# Patient Record
Sex: MALE | Race: OTHER | HISPANIC OR LATINO | URBAN - METROPOLITAN AREA
[De-identification: names, ages, dates, MRNs, and addresses within clinical notes are randomized per-mention and may not be internally consistent; named-entity substitution may affect disease eponyms.]

---

## 2017-08-23 ENCOUNTER — INPATIENT (INPATIENT)
Facility: HOSPITAL | Age: 82
LOS: 3 days | Discharge: NURSING, REHAB OR LONG-TERM CARE | DRG: 71 | End: 2017-08-27
Attending: HOSPITALIST | Admitting: FAMILY MEDICINE
Payer: MEDICARE

## 2017-08-23 VITALS
TEMPERATURE: 99 F | HEIGHT: 72 IN | OXYGEN SATURATION: 97 % | HEART RATE: 79 BPM | SYSTOLIC BLOOD PRESSURE: 131 MMHG | RESPIRATION RATE: 16 BRPM | WEIGHT: 214.95 LBS | DIASTOLIC BLOOD PRESSURE: 61 MMHG

## 2017-08-23 DIAGNOSIS — F03.90 UNSPECIFIED DEMENTIA, UNSPECIFIED SEVERITY, WITHOUT BEHAVIORAL DISTURBANCE, PSYCHOTIC DISTURBANCE, MOOD DISTURBANCE, AND ANXIETY: ICD-10-CM

## 2017-08-23 DIAGNOSIS — E86.0 DEHYDRATION: ICD-10-CM

## 2017-08-23 DIAGNOSIS — G93.40 ENCEPHALOPATHY, UNSPECIFIED: ICD-10-CM

## 2017-08-23 DIAGNOSIS — Z78.9 OTHER SPECIFIED HEALTH STATUS: Chronic | ICD-10-CM

## 2017-08-23 DIAGNOSIS — N17.9 ACUTE KIDNEY FAILURE, UNSPECIFIED: ICD-10-CM

## 2017-08-23 DIAGNOSIS — D53.9 NUTRITIONAL ANEMIA, UNSPECIFIED: ICD-10-CM

## 2017-08-23 DIAGNOSIS — Z29.9 ENCOUNTER FOR PROPHYLACTIC MEASURES, UNSPECIFIED: ICD-10-CM

## 2017-08-23 DIAGNOSIS — I10 ESSENTIAL (PRIMARY) HYPERTENSION: ICD-10-CM

## 2017-08-23 DIAGNOSIS — D72.829 ELEVATED WHITE BLOOD CELL COUNT, UNSPECIFIED: ICD-10-CM

## 2017-08-23 DIAGNOSIS — I50.9 HEART FAILURE, UNSPECIFIED: ICD-10-CM

## 2017-08-23 LAB
ALBUMIN SERPL ELPH-MCNC: 4.5 G/DL — SIGNIFICANT CHANGE UP (ref 3.3–5.2)
ALP SERPL-CCNC: 75 U/L — SIGNIFICANT CHANGE UP (ref 40–120)
ALT FLD-CCNC: 10 U/L — SIGNIFICANT CHANGE UP
ANION GAP SERPL CALC-SCNC: 16 MMOL/L — SIGNIFICANT CHANGE UP (ref 5–17)
APPEARANCE UR: CLEAR — SIGNIFICANT CHANGE UP
APTT BLD: 27.4 SEC — LOW (ref 27.5–37.4)
AST SERPL-CCNC: 33 U/L — SIGNIFICANT CHANGE UP
BILIRUB SERPL-MCNC: 0.5 MG/DL — SIGNIFICANT CHANGE UP (ref 0.4–2)
BILIRUB UR-MCNC: NEGATIVE — SIGNIFICANT CHANGE UP
BUN SERPL-MCNC: 42 MG/DL — HIGH (ref 8–20)
CALCIUM SERPL-MCNC: 9.6 MG/DL — SIGNIFICANT CHANGE UP (ref 8.6–10.2)
CHLORIDE SERPL-SCNC: 103 MMOL/L — SIGNIFICANT CHANGE UP (ref 98–107)
CO2 SERPL-SCNC: 21 MMOL/L — LOW (ref 22–29)
COLOR SPEC: YELLOW — SIGNIFICANT CHANGE UP
CREAT SERPL-MCNC: 2.2 MG/DL — HIGH (ref 0.5–1.3)
D DIMER BLD IA.RAPID-MCNC: 514 NG/ML DDU — HIGH
DIFF PNL FLD: ABNORMAL
EPI CELLS # UR: SIGNIFICANT CHANGE UP
GLUCOSE SERPL-MCNC: 117 MG/DL — HIGH (ref 70–115)
GLUCOSE UR QL: NEGATIVE MG/DL — SIGNIFICANT CHANGE UP
HCT VFR BLD CALC: 36.4 % — LOW (ref 42–52)
HGB BLD-MCNC: 12.3 G/DL — LOW (ref 14–18)
INR BLD: 1.09 RATIO — SIGNIFICANT CHANGE UP (ref 0.88–1.16)
KETONES UR-MCNC: NEGATIVE — SIGNIFICANT CHANGE UP
LACTATE BLDV-MCNC: 0.9 MMOL/L — SIGNIFICANT CHANGE UP (ref 0.5–2)
LACTATE BLDV-MCNC: 3.2 MMOL/L — HIGH (ref 0.5–2)
LEUKOCYTE ESTERASE UR-ACNC: NEGATIVE — SIGNIFICANT CHANGE UP
LYMPHOCYTES # BLD AUTO: 2 % — LOW (ref 20–55)
MCHC RBC-ENTMCNC: 33.8 G/DL — SIGNIFICANT CHANGE UP (ref 32–36)
MCHC RBC-ENTMCNC: 34.3 PG — HIGH (ref 27–31)
MCV RBC AUTO: 101.4 FL — HIGH (ref 80–94)
MONOCYTES NFR BLD AUTO: 5 % — SIGNIFICANT CHANGE UP (ref 3–10)
NEUTROPHILS NFR BLD AUTO: 93 % — HIGH (ref 37–73)
NITRITE UR-MCNC: NEGATIVE — SIGNIFICANT CHANGE UP
NT-PROBNP SERPL-SCNC: 463 PG/ML — HIGH (ref 0–300)
PH UR: 5 — SIGNIFICANT CHANGE UP (ref 5–8)
PLAT MORPH BLD: NORMAL — SIGNIFICANT CHANGE UP
PLATELET # BLD AUTO: 262 K/UL — SIGNIFICANT CHANGE UP (ref 150–400)
POTASSIUM SERPL-MCNC: 4.7 MMOL/L — SIGNIFICANT CHANGE UP (ref 3.5–5.3)
POTASSIUM SERPL-SCNC: 4.7 MMOL/L — SIGNIFICANT CHANGE UP (ref 3.5–5.3)
PROCALCITONIN SERPL-MCNC: 0.05 NG/ML — HIGH (ref 0–0.04)
PROT SERPL-MCNC: 7.5 G/DL — SIGNIFICANT CHANGE UP (ref 6.6–8.7)
PROT UR-MCNC: NEGATIVE MG/DL — SIGNIFICANT CHANGE UP
PROTHROM AB SERPL-ACNC: 12 SEC — SIGNIFICANT CHANGE UP (ref 9.8–12.7)
RBC # BLD: 3.59 M/UL — LOW (ref 4.6–6.2)
RBC # FLD: 12.7 % — SIGNIFICANT CHANGE UP (ref 11–15.6)
RBC BLD AUTO: NORMAL — SIGNIFICANT CHANGE UP
RBC CASTS # UR COMP ASSIST: SIGNIFICANT CHANGE UP /HPF (ref 0–4)
SODIUM SERPL-SCNC: 140 MMOL/L — SIGNIFICANT CHANGE UP (ref 135–145)
SP GR SPEC: 1.01 — SIGNIFICANT CHANGE UP (ref 1.01–1.02)
TROPONIN T SERPL-MCNC: <0.01 NG/ML — SIGNIFICANT CHANGE UP (ref 0–0.06)
TSH SERPL-MCNC: 2.62 UIU/ML — SIGNIFICANT CHANGE UP (ref 0.27–4.2)
UROBILINOGEN FLD QL: NEGATIVE MG/DL — SIGNIFICANT CHANGE UP
WBC # BLD: 12.8 K/UL — HIGH (ref 4.8–10.8)
WBC # FLD AUTO: 12.8 K/UL — HIGH (ref 4.8–10.8)
WBC UR QL: SIGNIFICANT CHANGE UP

## 2017-08-23 PROCEDURE — 99285 EMERGENCY DEPT VISIT HI MDM: CPT

## 2017-08-23 PROCEDURE — 71250 CT THORAX DX C-: CPT | Mod: 26

## 2017-08-23 PROCEDURE — 99223 1ST HOSP IP/OBS HIGH 75: CPT

## 2017-08-23 PROCEDURE — 70450 CT HEAD/BRAIN W/O DYE: CPT | Mod: 26

## 2017-08-23 PROCEDURE — 71010: CPT | Mod: 26

## 2017-08-23 RX ORDER — AZITHROMYCIN 500 MG/1
500 TABLET, FILM COATED ORAL ONCE
Qty: 0 | Refills: 0 | Status: COMPLETED | OUTPATIENT
Start: 2017-08-23 | End: 2017-08-23

## 2017-08-23 RX ORDER — CEFTRIAXONE 500 MG/1
1 INJECTION, POWDER, FOR SOLUTION INTRAMUSCULAR; INTRAVENOUS ONCE
Qty: 0 | Refills: 0 | Status: COMPLETED | OUTPATIENT
Start: 2017-08-23 | End: 2017-08-23

## 2017-08-23 RX ORDER — SODIUM CHLORIDE 9 MG/ML
500 INJECTION INTRAMUSCULAR; INTRAVENOUS; SUBCUTANEOUS ONCE
Qty: 0 | Refills: 0 | Status: COMPLETED | OUTPATIENT
Start: 2017-08-23 | End: 2017-08-23

## 2017-08-23 RX ORDER — AZITHROMYCIN 500 MG/1
500 TABLET, FILM COATED ORAL DAILY
Qty: 0 | Refills: 0 | Status: DISCONTINUED | OUTPATIENT
Start: 2017-08-24 | End: 2017-08-26

## 2017-08-23 RX ORDER — CEFTRIAXONE 500 MG/1
1 INJECTION, POWDER, FOR SOLUTION INTRAMUSCULAR; INTRAVENOUS DAILY
Qty: 0 | Refills: 0 | Status: DISCONTINUED | OUTPATIENT
Start: 2017-08-24 | End: 2017-08-26

## 2017-08-23 RX ORDER — RAMIPRIL 5 MG
0 CAPSULE ORAL
Qty: 0 | Refills: 0 | COMMUNITY

## 2017-08-23 RX ORDER — DOCUSATE SODIUM 100 MG
0 CAPSULE ORAL
Qty: 0 | Refills: 0 | COMMUNITY

## 2017-08-23 RX ORDER — SODIUM CHLORIDE 9 MG/ML
1000 INJECTION INTRAMUSCULAR; INTRAVENOUS; SUBCUTANEOUS
Qty: 0 | Refills: 0 | Status: COMPLETED | OUTPATIENT
Start: 2017-08-23 | End: 2017-08-23

## 2017-08-23 RX ORDER — HEPARIN SODIUM 5000 [USP'U]/ML
5000 INJECTION INTRAVENOUS; SUBCUTANEOUS EVERY 12 HOURS
Qty: 0 | Refills: 0 | Status: DISCONTINUED | OUTPATIENT
Start: 2017-08-23 | End: 2017-08-27

## 2017-08-23 RX ORDER — FUROSEMIDE 40 MG
0 TABLET ORAL
Qty: 0 | Refills: 0 | COMMUNITY

## 2017-08-23 RX ORDER — TAMSULOSIN HYDROCHLORIDE 0.4 MG/1
0.4 CAPSULE ORAL AT BEDTIME
Qty: 0 | Refills: 0 | Status: DISCONTINUED | OUTPATIENT
Start: 2017-08-23 | End: 2017-08-27

## 2017-08-23 RX ORDER — DONEPEZIL HYDROCHLORIDE 10 MG/1
0 TABLET, FILM COATED ORAL
Qty: 0 | Refills: 0 | COMMUNITY

## 2017-08-23 RX ORDER — SACCHAROMYCES BOULARDII 250 MG
250 POWDER IN PACKET (EA) ORAL
Qty: 0 | Refills: 0 | Status: DISCONTINUED | OUTPATIENT
Start: 2017-08-23 | End: 2017-08-27

## 2017-08-23 RX ORDER — DONEPEZIL HYDROCHLORIDE 10 MG/1
5 TABLET, FILM COATED ORAL AT BEDTIME
Qty: 0 | Refills: 0 | Status: DISCONTINUED | OUTPATIENT
Start: 2017-08-23 | End: 2017-08-27

## 2017-08-23 RX ORDER — METOPROLOL TARTRATE 50 MG
25 TABLET ORAL
Qty: 0 | Refills: 0 | Status: DISCONTINUED | OUTPATIENT
Start: 2017-08-23 | End: 2017-08-24

## 2017-08-23 RX ORDER — FINASTERIDE 5 MG/1
0 TABLET, FILM COATED ORAL
Qty: 0 | Refills: 0 | COMMUNITY

## 2017-08-23 RX ORDER — RISPERIDONE 4 MG/1
0.25 TABLET ORAL DAILY
Qty: 0 | Refills: 0 | Status: DISCONTINUED | OUTPATIENT
Start: 2017-08-23 | End: 2017-08-27

## 2017-08-23 RX ORDER — SODIUM CHLORIDE 9 MG/ML
1000 INJECTION INTRAMUSCULAR; INTRAVENOUS; SUBCUTANEOUS
Qty: 0 | Refills: 0 | Status: DISCONTINUED | OUTPATIENT
Start: 2017-08-23 | End: 2017-08-23

## 2017-08-23 RX ORDER — SODIUM CHLORIDE 9 MG/ML
1000 INJECTION INTRAMUSCULAR; INTRAVENOUS; SUBCUTANEOUS ONCE
Qty: 0 | Refills: 0 | Status: COMPLETED | OUTPATIENT
Start: 2017-08-23 | End: 2017-08-23

## 2017-08-23 RX ORDER — RISPERIDONE 4 MG/1
0 TABLET ORAL
Qty: 0 | Refills: 0 | COMMUNITY

## 2017-08-23 RX ADMIN — CEFTRIAXONE 100 GRAM(S): 500 INJECTION, POWDER, FOR SOLUTION INTRAMUSCULAR; INTRAVENOUS at 15:00

## 2017-08-23 RX ADMIN — AZITHROMYCIN 255 MILLIGRAM(S): 500 TABLET, FILM COATED ORAL at 14:08

## 2017-08-23 RX ADMIN — SODIUM CHLORIDE 500 MILLILITER(S): 9 INJECTION INTRAMUSCULAR; INTRAVENOUS; SUBCUTANEOUS at 09:28

## 2017-08-23 RX ADMIN — Medication 25 MILLIGRAM(S): at 22:28

## 2017-08-23 RX ADMIN — DONEPEZIL HYDROCHLORIDE 5 MILLIGRAM(S): 10 TABLET, FILM COATED ORAL at 22:29

## 2017-08-23 RX ADMIN — SODIUM CHLORIDE 75 MILLILITER(S): 9 INJECTION INTRAMUSCULAR; INTRAVENOUS; SUBCUTANEOUS at 22:29

## 2017-08-23 RX ADMIN — TAMSULOSIN HYDROCHLORIDE 0.4 MILLIGRAM(S): 0.4 CAPSULE ORAL at 22:29

## 2017-08-23 RX ADMIN — SODIUM CHLORIDE 1000 MILLILITER(S): 9 INJECTION INTRAMUSCULAR; INTRAVENOUS; SUBCUTANEOUS at 12:51

## 2017-08-23 NOTE — H&P ADULT - NSHPSOCIALHISTORY_GEN_ALL_CORE
Pt lives in a nursery home ( [facility]) , no hx of smoking or alcohol as per nursery home staff

## 2017-08-23 NOTE — H&P ADULT - HISTORY OF PRESENT ILLNESS
Pt is an 88 yr old M with PMH of HTN, BPH, Dementia, CHF and blindness of right eye , pt is a poor historian . Pt was brought in by the EMS. As per EMS and staff from the nursery  home pt normally gets out of bed and comes to breakfast and today he did not get out of bed, he seemed more lethargic, more confused than usual, on his baseline pt is able to ambulate and eat by himself, on vitals signs taken by the EMS pt was hypotensive with bp= 100/50 mmhg , he denies chest pain , palpitation , headache , no diarrhea or abdominal pain , dizziness , no calf tenderness, no recent travel. Pt is an 88 yr old M with PMH of HTN, BPH, Dementia, CHF and blindness of right eye , pt is a poor historian . Pt was brought in by the EMS. As per EMS and staff from the nursery  home pt normally gets out of bed daily  and comes to breakfast but today he did not get out of bed, he seemed more lethargic, more confused than usual, on his baseline pt is able to ambulate and eat by himself. On  vitals signs taken by the EMS pt was hypotensive with bp= 100/50 mmhg ,he denies chest pain , palpitations , headache , no diarrhea or abdominal pain , dizziness , no calf tenderness, no recent travel. Pt is an 88 yr old M with PMH of HTN, BPH, Dementia, CHF and blindness of right eye , pt is a poor historian . Pt was brought in by the EMS. As per EMS and staff from the nursery  home pt normally gets out of bed daily  and comes to breakfast but today he did not get out of bed, he seemed more lethargic, more confused than usual, on his baseline pt is able to ambulate and eat by himself. The day prior to this event pt was having his normal behavior, the  staff from the nursery home do not exactly when he was last time he was know well.  On  vitals signs taken by the EMS pt was hypotensive with bp= 100/50 mmhg ,he denies chest pain , palpitations , headache , no diarrhea or abdominal pain , dizziness , no calf tenderness, no recent travel.

## 2017-08-23 NOTE — H&P ADULT - PROBLEM SELECTOR PLAN 2
- pt had 2 lit of fluid in the ED will continue gentle hydration   -strict in and out   -will repeat lab in the am  -hold home diuretic - pt had 2 lits of fluid in the ED will continue gentle hydration at 75 ml / hr  -strict in and out   -will repeat lab in the am  -hold home diuretic for now

## 2017-08-23 NOTE — ED ADULT NURSE REASSESSMENT NOTE - NS ED NURSE REASSESS COMMENT FT1
CORDELIA Fink @ [facility] called and reports that typically patient is alert and oriented to self with disorientation. Normally patient wakes up and eats breakfast and is active walking around home. patient today noted with increased lethargy, increasing AMS beyond typical baseline, did not want to eat breakfast, low temp 95.3.

## 2017-08-23 NOTE — H&P ADULT - PMH
Blindness of one eye    CHF (congestive heart failure)    Dementia    Hypertension    Poor historian

## 2017-08-23 NOTE — H&P ADULT - ATTENDING COMMENTS
Pt is an 88 yr old M with PMH of HTN, BPH, Dementia, CHF and blindness of right eye , pt is a poor historian . Pt was brought in by the EMS.  Mr. Muniz has had an abrupt change in mentation and fatigue. He is noted to have an elevated WBC level with a left shift. There is concern that Mr. Muniz has an infections process possibly a pneumonia. Patient has an elevated procalcitonin. At this time we will get a stat CT chest without contrast.  If the imaging support our diagnosis we will treat with IV zosyn.  This case was discussed with Dr. Orr and PGY-1 Dr. Solorio.    Dr. Orr agrees with the management. Pt is an 88 yr old M with PMH of HTN, BPH, Dementia, CHF and blindness of right eye , pt is a poor historian . Pt was brought in by the EMS.  Mr. Muniz has had an abrupt change in mentation and fatigue. He is noted to have an elevated WBC level with a left shift. There is concern that Mr. Muniz has an infections process possibly a pneumonia. Patient has an elevated procalcitonin. At this time we will get a stat CT chest without contrast.  If the imaging support our diagnosis we will treat with IV zosyn.  This case was discussed with Dr. Orr and PGY-1 Dr. Solorio.    Dr. Orr agrees with the management.  agree with the management

## 2017-08-23 NOTE — H&P ADULT - ASSESSMENT
Pt is a 88 yr old with PMH of vascular dementia, right eye blindness' ,  CHF, HTN, BPH  who was brought in by the EMS from his nursery home with a CC of change in mental status, as per staff member of his nursery home pt usually wake up in the morning and has breakfast, but today he had some unusual behavior , he was lethargy and confused . This pt has some clinical sing of dehydration, confused, he WBC was elevated and his WBC on previous admissions, his kidney function shows elevated BNU and Cr showing sings of acute dehydration and he was blood pressure was 100/50 mmhg  . His Ct of head was negative for any acute changes  , CXR was clear. Pt is alert to place , no to time or person. Pt is a 88 yr old with PMH of vascular dementia, right eye blindness' ,  CHF, HTN, BPH  who was brought in by the EMS from his nursery home with a CC of change in mental status, as per staff member of his nursery home pt usually wake up in the morning and has breakfast, but today he had some unusual behavior , he was lethargy and confused . This pt has some clinical sing of dehydration, he is confused, his WBC was elevated and his WBC on previous admissions was normal , his kidney function shows elevated BNU and Cr most likely secondary  of acute dehydration and he was blood pressure was 100/50 mmhg . His Ct of head was negative for any acute changes  , CXR was clear. Pt is alert to place , no to time or person.

## 2017-08-23 NOTE — ED ADULT TRIAGE NOTE - CHIEF COMPLAINT QUOTE
BIBA, patient awoke by staff noted with increased lethargy, AMS. MD encarnacion to bedside. A&Ox 1, . Unknown baseline mental status, sent with med list and face sheet.

## 2017-08-23 NOTE — ED PROVIDER NOTE - CARE PLAN
Principal Discharge DX:	Dementia  Secondary Diagnosis:	Altered mental status, unspecified Principal Discharge DX:	Dementia  Secondary Diagnosis:	Altered mental status, unspecified  Secondary Diagnosis:	Dehydration

## 2017-08-23 NOTE — H&P ADULT - PROBLEM SELECTOR PLAN 3
- likely 2/2 to infection with WBC of 12.8 and on previous hospitalization his WBC count was between 6.7   -will obtain a differential , will repeat lab in the am  -BCx and Urine culture are pending

## 2017-08-23 NOTE — H&P ADULT - PROBLEM SELECTOR PLAN 4
-likely 2/2 to dehydration  -BUN and Cr are elevated uncleared for any chronic kidney disease   - pt had 2 lit of fluid in the ED will continue gentle hydration   -will trend BUN and Cr in the am -likely 2/2 to dehydration  -BUN and Cr are elevated uncleared for any chronic kidney disease previously   - pt had 2 lit of fluid in the ED will continue gentle hydration at 75 ml/hr  -will trend BUN and Cr in the am

## 2017-08-23 NOTE — H&P ADULT - NSHPLABSRESULTS_GEN_ALL_CORE
12.3   12.8  )-----------( 262      ( 23 Aug 2017 11:41 )             36.4   08-23    140  |  103  |  42.0<H>  ----------------------------<  117<H>  4.7   |  21.0<L>  |  2.20<H>    Ca    9.6      23 Aug 2017 11:41    TPro  7.5  /  Alb  4.5  /  TBili  0.5  /  DBili  x   /  AST  33  /  ALT  10  /  AlkPhos  75  08-23    Urinalysis (08.23.17 @ 11:55)    Glucose Qualitative, Urine: Negative mg/dL    Blood, Urine: Moderate    pH Urine: 5.0    Color: Yellow    Urine Appearance: Clear    Bilirubin: Negative    Ketone - Urine: Negative    Specific Gravity: 1.015    Protein, Urine: Negative mg/dL    Urobilinogen: Negative mg/dL    Nitrite: Negative    Leukocyte Esterase Concentration: Negative        CT Head No Cont (08.23.17 @ 12:56)     IMPRESSION:  Mild chronic microvascular changes without evidence of an   acute transcortical infarction or hemorrhage. MR is a more sensitive   imaging modality for the evaluation of an acute infarction.

## 2017-08-23 NOTE — H&P ADULT - NSHPREVIEWOFSYSTEMS_GEN_ALL_CORE
CONSTITUTIONAL: No fever, weight loss, or fatigue  EYES: No eye pain, or discharge, blindness or right eye   ENMT:  No difficulty hearing, tinnitus, vertigo; No sinus or throat pain  NECK: No pain or stiffness  RESPIRATORY: No cough, wheezing, chills or hemoptysis; No shortness of breath  CARDIOVASCULAR: No chest pain, palpitations, dizziness, or leg swelling  GASTROINTESTINAL: No abdominal or epigastric pain. No nausea, vomiting, or hematemesis; No diarrhea or constipation. No melena or hematochezia.  GENITOURINARY: No dysuria, frequency, hematuria, or incontinence  NEUROLOGICAL: No headaches, positive memory loss, no numbness, positive tremors in bilateral upper extremities   SKIN: No itching, burning, rashes, or lesions   MUSCULOSKELETAL: No joint pain or swelling; No muscle, back, or extremity pain  HEME/LYMPH: No easy bruising, or bleeding gums  ALLERY AND IMMUNOLOGIC: No hives or eczema Pt is a poor historian , he had PMH of vascular dementia    CONSTITUTIONAL: No fever, weight loss, or fatigue  EYES: No eye pain, or discharge, blindness or right eye   ENMT:  No difficulty hearing, tinnitus, vertigo; No sinus or throat pain  NECK: No pain or stiffness  RESPIRATORY: No cough, chills or hemoptysis; No shortness of breath  CARDIOVASCULAR: No chest pain, palpitations, dizziness, or leg swelling  GASTROINTESTINAL: No abdominal or epigastric pain. No nausea, vomiting, or hematemesis; No diarrhea or constipation. No melena or hematochezia.  GENITOURINARY: No dysuria, frequency, hematuria,   NEUROLOGICAL: No headaches, positive memory loss, no numbness, positive tremors in bilateral upper extremities   SKIN: No itching, burning, rashes, or lesions   MUSCULOSKELETAL: No joint pain or swelling; No muscle, back, or extremity pain  HEME/LYMPH: No easy bruising, or bleeding gums

## 2017-08-23 NOTE — ED ADULT NURSE NOTE - OBJECTIVE STATEMENT
pt presents to ED sent by [facility] for increased lethargy this morning an dpt did not eat breakfast,. pt has hx of dementia. baseline mental status is unknown. pt a&ox1, pt alert to name. breathing si even and unlabored. airway patent. afebrile. pt denies pain at this time. breathing is even and unlabored. fluids infusing well. will continue to monitor and reassess

## 2017-08-23 NOTE — H&P ADULT - PROBLEM SELECTOR PLAN 5
-Hb = 12.3 with elevated MCV   - vit b12, folic acid, transferrin , iron saturation , TIBC,  ordered -Hb = 12.3 with elevated MCV   - vit b12, folic acid, transferrin , reticulocyte count ,iron saturation , TIBC,  ordered

## 2017-08-23 NOTE — ED PROVIDER NOTE - OBJECTIVE STATEMENT
89y/o M, woth PMH of HTN, dementia, brought in by EMS from NH. EMS states NH staff told them that the pt normally gets out of bed and comes to breakfast and today he did not get out of bed, he seemed more lethargic, more confused than usual. When they asked when was he last known well, EMS was told the that they do not know because they are the day staff and they do not know how he was last night. The pt has no complaints. He knows his name and that he's in a hospital. He does not know the day or time of day.

## 2017-08-23 NOTE — H&P ADULT - PROBLEM SELECTOR PLAN 7
-pt is not in acute CHF at this time   -daily weight  -strict in and out  -will hold his diuretics an ramipril for now   -start metoprolol 25 mg bib, will hold metoprolol ER 50 mg for now   - -pt is not in acute CHF at this time   -daily weight  -strict in and out  -will hold his diuretics an ramipril for now   -start metoprolol 25 mg bib, will hold metoprolol ER 50 mg for now

## 2017-08-23 NOTE — ED PROVIDER NOTE - MEDICAL DECISION MAKING DETAILS
Elderly pt with hx of dementia reported to be more confused and less responsive by NH staff. NAD exam consistent with demenia exacerbation or possible superimposed infection, no focality to suggest stroke. We will get W/U with labs, cultures, head CT, EKG, and reevaluate Elderly pt with hx of dementia reported to be more confused and less responsive by NH staff. NAD exam consistent with dementia exacerbation or possible superimposed infection, no focality to suggest stroke. We will get W/U with labs, cultures, head CT, EKG, and reevaluate

## 2017-08-23 NOTE — ED PROVIDER NOTE - PHYSICAL EXAMINATION
Pt was able to stand and take a few steps with assistance. Pt states through the  that he normally walks with a walker. Rectal Temp:99.9

## 2017-08-23 NOTE — H&P ADULT - PROBLEM SELECTOR PLAN 1
-likely secondary to infectious etiology vs dehydration   -admit to any any medical  bed   -admit to medicine service under Dr Orr   -activity ambulate with assitance , falls precautions  -vitals per routine   -diet DASH   -strict in and out  -CBC , CMP, ua, blood culture x 3, lactate , procalcitonin , urine culture   - AM lab cbc, cmp  -pt had 2 lit of fluid bolus and now will continue gentle hydration   -pt was tx in the ED with Rocephin and azithromycin   -head CT was negative for acute pathology , CXR was clear   -PT consult, nutritional consult and  consul were placed -likely secondary to infectious etiology vs dehydration   -admit to any any medical  bed , medicine service under Dr Orr   -activity ambulate with assistance , falls precautions  -vitals per routine   -diet DASH   -strict in and out  -CBC , CMP, ua, blood culture x 3, lactate , procalcitonin , urine culture   - AM lab cbc, cmp  -pt had 2 lit of fluid bolus and now will continue gentle hydration   -pt was tx in the ED with Rocephin and azithromycin   -head CT was negative for acute pathology , CXR was clear   -PT consult, nutritional consult and  consul were placed -likely secondary to infectious etiology vs dehydration   -admit to any any medical  bed , medicine service under Dr Orr   -activity ambulate with assistance , falls precautions  -vitals per routine   -diet DASH   -strict in and out  -CBC , CMP, ua, blood culture x 3, lactate , procalcitonin , urine culture   - AM lab cbc, cmp  -pt had 2 lit of fluid bolus and now will continue gentle hydration   -pt was tx in the ED with Rocephin and azithromycin   -head CT was negative for acute pathology , CXR was clear , CT of chest was ordered  to rule out PNA , if positive for PNA will start tx w/ zosyn if negative will continue Rocephin and azithromycin   -PT consult, nutritional consult and  consul were placed

## 2017-08-23 NOTE — ED ADULT NURSE REASSESSMENT NOTE - NS ED NURSE REASSESS COMMENT FT1
verbal report given to CORDELIA jarrett in holding room. pt resting comfortably in cart. breathing si even and unlabored. NAD VSS<

## 2017-08-23 NOTE — H&P ADULT - NSHPPHYSICALEXAM_GEN_ALL_CORE
GENERAL: NAD, well-groomed, well-developed  HEAD:  Atraumatic, Normocephalic  EYES: EOMI, PERRLA, conjunctiva and sclera clear  ENMT: No tonsillar erythema, exudates, or enlargement; Dry  mucous membranes, no dentition, No lesions  NECK: Supple, No JVD, Normal thyroid  LUNG: Clear to auscultation bilaterally; No rales, rhonchi, wheezing, or rubs  HEART: Regular rate and rhythm; No murmurs, rubs, or gallops  ABDOMEN: Soft, Nontender, Nondistended; Bowel sounds present  EXTREMITIES:  2+ Peripheral Pulses, No clubbing, cyanosis, or edema  LYMPH: No lymphadenopathy noted  RECTAL: No masses, prostate normal size and smooth, Guiac negative   NERVOUS SYSTEM:  Alert & Oriented X1 ( to person , no to place , no to time ) Motor Strength 5/5 B/L upper and lower extremities  SKIN: No rashes, lesions, no laceration, no ulcers GENERAL: NAD, well-groomed, pt seem to be confused   HEAD:  Atraumatic, Normocephalic  EYES: EOMI, PERRLA, conjunctiva and sclera clear, blindness or right eye   ENMT: No tonsillar erythema, exudates, or enlargement; Dry  mucous membranes, no dentition, No lesions  NECK: Supple, No JVD, Normal thyroid  LUNG: Clear to auscultation bilaterally; No rales, rhonchi, wheezing, or rubs  HEART: Regular rate and rhythm; No murmurs, rubs, or gallops  ABDOMEN: Soft, Nontender, Nondistended; Bowel sounds present  EXTREMITIES:  2+ Peripheral Pulses, No clubbing, cyanosis, or edema  LYMPH: No lymphadenopathy noted  RECTAL: No masses, prostate normal size and smooth, Guiac negative   NERVOUS SYSTEM:  Alert & Oriented X1 ( to person , no to place , no to time ) Motor Strength 5/5 B/L upper and lower extremities, sensation intact   SKIN: No rashes, lesions, no laceration, no ulcers

## 2017-08-23 NOTE — H&P ADULT - PROBLEM SELECTOR PLAN 6
-likely essential HTN  -pt blood pressure was low  -pt on home dose of metoprolol ER 50 mg, will use metoprolol 25 mg bid for now  -will hold his diuretic and ramipril for now b/c pt has JESSE -likely essential HTN  -pt blood pressure was low 100/50 mmhg  -pt on home dose of metoprolol ER 50 mg, will use metoprolol 25 mg bid for now  -will hold his diuretic and ramipril for now b/c pt has JESSE

## 2017-08-23 NOTE — ED PROVIDER NOTE - CONSTITUTIONAL, MLM
normal... Well appearing, well nourished, awake, alert, oriented to person, place,  and in no apparent distress.

## 2017-08-24 LAB
ALBUMIN SERPL ELPH-MCNC: 3.7 G/DL — SIGNIFICANT CHANGE UP (ref 3.3–5.2)
ALP SERPL-CCNC: 67 U/L — SIGNIFICANT CHANGE UP (ref 40–120)
ALT FLD-CCNC: 22 U/L — SIGNIFICANT CHANGE UP
ANION GAP SERPL CALC-SCNC: 13 MMOL/L — SIGNIFICANT CHANGE UP (ref 5–17)
AST SERPL-CCNC: 108 U/L — HIGH
BILIRUB SERPL-MCNC: 0.6 MG/DL — SIGNIFICANT CHANGE UP (ref 0.4–2)
BUN SERPL-MCNC: 32 MG/DL — HIGH (ref 8–20)
CALCIUM SERPL-MCNC: 9.2 MG/DL — SIGNIFICANT CHANGE UP (ref 8.6–10.2)
CHLORIDE SERPL-SCNC: 106 MMOL/L — SIGNIFICANT CHANGE UP (ref 98–107)
CO2 SERPL-SCNC: 22 MMOL/L — SIGNIFICANT CHANGE UP (ref 22–29)
CREAT SERPL-MCNC: 1.4 MG/DL — HIGH (ref 0.5–1.3)
CULTURE RESULTS: SIGNIFICANT CHANGE UP
EOSINOPHIL # BLD AUTO: 0.1 K/UL — SIGNIFICANT CHANGE UP (ref 0–0.5)
EOSINOPHIL NFR BLD AUTO: 0.8 % — SIGNIFICANT CHANGE UP (ref 0–5)
FOLATE SERPL-MCNC: 9.6 NG/ML — SIGNIFICANT CHANGE UP (ref 4–16)
GLUCOSE SERPL-MCNC: 111 MG/DL — SIGNIFICANT CHANGE UP (ref 70–115)
HCT VFR BLD CALC: 36.3 % — LOW (ref 42–52)
HGB BLD-MCNC: 12.5 G/DL — LOW (ref 14–18)
IRON SATN MFR SERPL: 32 % — SIGNIFICANT CHANGE UP (ref 16–55)
IRON SATN MFR SERPL: 89 UG/DL — SIGNIFICANT CHANGE UP (ref 59–158)
LYMPHOCYTES # BLD AUTO: 1 K/UL — SIGNIFICANT CHANGE UP (ref 1–4.8)
LYMPHOCYTES # BLD AUTO: 9.9 % — LOW (ref 20–55)
MAGNESIUM SERPL-MCNC: 2 MG/DL — SIGNIFICANT CHANGE UP (ref 1.6–2.6)
MCHC RBC-ENTMCNC: 34.4 G/DL — SIGNIFICANT CHANGE UP (ref 32–36)
MCHC RBC-ENTMCNC: 34.8 PG — HIGH (ref 27–31)
MCV RBC AUTO: 101.1 FL — HIGH (ref 80–94)
MONOCYTES # BLD AUTO: 1 K/UL — HIGH (ref 0–0.8)
MONOCYTES NFR BLD AUTO: 9.5 % — SIGNIFICANT CHANGE UP (ref 3–10)
NEUTROPHILS # BLD AUTO: 8 K/UL — SIGNIFICANT CHANGE UP (ref 1.8–8)
NEUTROPHILS NFR BLD AUTO: 79.4 % — HIGH (ref 37–73)
PHOSPHATE SERPL-MCNC: 2.8 MG/DL — SIGNIFICANT CHANGE UP (ref 2.4–4.7)
PLATELET # BLD AUTO: 234 K/UL — SIGNIFICANT CHANGE UP (ref 150–400)
POTASSIUM SERPL-MCNC: 5 MMOL/L — SIGNIFICANT CHANGE UP (ref 3.5–5.3)
POTASSIUM SERPL-SCNC: 5 MMOL/L — SIGNIFICANT CHANGE UP (ref 3.5–5.3)
PROT SERPL-MCNC: 6.8 G/DL — SIGNIFICANT CHANGE UP (ref 6.6–8.7)
RBC # BLD: 3.59 M/UL — LOW (ref 4.6–6.2)
RBC # BLD: 3.59 M/UL — LOW (ref 4.6–6.2)
RBC # FLD: 12.5 % — SIGNIFICANT CHANGE UP (ref 11–15.6)
RETICS #: 9.2 K/UL — LOW (ref 25–125)
RETICS/RBC NFR: 2.6 % — SIGNIFICANT CHANGE UP (ref 0.5–2.6)
SODIUM SERPL-SCNC: 141 MMOL/L — SIGNIFICANT CHANGE UP (ref 135–145)
SPECIMEN SOURCE: SIGNIFICANT CHANGE UP
TIBC SERPL-MCNC: 275 UG/DL — SIGNIFICANT CHANGE UP (ref 220–430)
TRANSFERRIN SERPL-MCNC: 192 MG/DL — SIGNIFICANT CHANGE UP (ref 180–329)
VIT B12 SERPL-MCNC: 455 PG/ML — SIGNIFICANT CHANGE UP (ref 180–914)
WBC # BLD: 10.1 K/UL — SIGNIFICANT CHANGE UP (ref 4.8–10.8)
WBC # FLD AUTO: 10.1 K/UL — SIGNIFICANT CHANGE UP (ref 4.8–10.8)

## 2017-08-24 PROCEDURE — 76775 US EXAM ABDO BACK WALL LIM: CPT | Mod: 26

## 2017-08-24 PROCEDURE — 99233 SBSQ HOSP IP/OBS HIGH 50: CPT

## 2017-08-24 PROCEDURE — 93306 TTE W/DOPPLER COMPLETE: CPT | Mod: 26

## 2017-08-24 RX ORDER — DOCUSATE SODIUM 100 MG
100 CAPSULE ORAL DAILY
Qty: 0 | Refills: 0 | Status: DISCONTINUED | OUTPATIENT
Start: 2017-08-24 | End: 2017-08-27

## 2017-08-24 RX ORDER — FUROSEMIDE 40 MG
20 TABLET ORAL
Qty: 0 | Refills: 0 | Status: DISCONTINUED | OUTPATIENT
Start: 2017-08-24 | End: 2017-08-27

## 2017-08-24 RX ORDER — LISINOPRIL 2.5 MG/1
5 TABLET ORAL DAILY
Qty: 0 | Refills: 0 | Status: DISCONTINUED | OUTPATIENT
Start: 2017-08-24 | End: 2017-08-27

## 2017-08-24 RX ORDER — METOPROLOL TARTRATE 50 MG
25 TABLET ORAL DAILY
Qty: 0 | Refills: 0 | Status: DISCONTINUED | OUTPATIENT
Start: 2017-08-25 | End: 2017-08-27

## 2017-08-24 RX ADMIN — Medication 1 DROP(S): at 22:08

## 2017-08-24 RX ADMIN — CEFTRIAXONE 100 GRAM(S): 500 INJECTION, POWDER, FOR SOLUTION INTRAMUSCULAR; INTRAVENOUS at 12:45

## 2017-08-24 RX ADMIN — TAMSULOSIN HYDROCHLORIDE 0.4 MILLIGRAM(S): 0.4 CAPSULE ORAL at 22:08

## 2017-08-24 RX ADMIN — AZITHROMYCIN 255 MILLIGRAM(S): 500 TABLET, FILM COATED ORAL at 12:44

## 2017-08-24 RX ADMIN — HEPARIN SODIUM 5000 UNIT(S): 5000 INJECTION INTRAVENOUS; SUBCUTANEOUS at 22:08

## 2017-08-24 RX ADMIN — Medication 250 MILLIGRAM(S): at 22:08

## 2017-08-24 RX ADMIN — HEPARIN SODIUM 5000 UNIT(S): 5000 INJECTION INTRAVENOUS; SUBCUTANEOUS at 06:26

## 2017-08-24 RX ADMIN — DONEPEZIL HYDROCHLORIDE 5 MILLIGRAM(S): 10 TABLET, FILM COATED ORAL at 22:09

## 2017-08-24 NOTE — PROGRESS NOTE ADULT - PROBLEM SELECTOR PLAN 1
-likely secondary to infectious etiology vs dehydration  -follow up of mental status   -wbc count is trending down

## 2017-08-24 NOTE — PROGRESS NOTE ADULT - SUBJECTIVE AND OBJECTIVE BOX
CC: altered mental status     Pt is a 88 yr old with PMH of vascular dementia, right eye blindness' ,  CHF, HTN, BPH  who was brought in by the EMS from his nursery home. No acute events over night, pt has been afebrile , bp and hr has been normal . Pt denies chest pain , no sob, no fever or chills .    < from: CT Chest No Cont (08.23.17 @ 18:48) >  MPRESSION:    No acute CT pathology. Bilateral lower lobe interstitial fibrosis..      < end of copied text >  ROS: no fever, abdominal pain, diarrhea, chills, cough, n/v, constipation. no headache      Vital Signs Last 24 Hrs  T(C): 36.7 (24 Aug 2017 00:05), Max: 37.7 (23 Aug 2017 09:21)  T(F): 98.1 (24 Aug 2017 00:05), Max: 99.9 (23 Aug 2017 09:21)  HR: 67 (24 Aug 2017 00:05) (66 - 79)  BP: 107/63 (24 Aug 2017 00:05) (107/63 - 137/72)  BP(mean): --  RR: 19 (24 Aug 2017 00:05) (16 - 19)  SpO2: 95% (24 Aug 2017 00:05) (95% - 98%)      GENERAL: NAD, well-groomed, well-developed  HEAD:  Atraumatic, Normocephalic  EYES: EOMI, blindness of right eye   ENMT: No tonsillar erythema, exudates, or enlargement; Moist mucous membranes,, No lesions  LUNG: Clear to auscultation bilaterally; No rales, rhonchi, wheezing, or rubs  HEART: Regular rate and rhythm; No murmurs, rubs, or gallops, normal s1/s2  ABDOMEN: Soft, Nontender, Nondistended; Bowel sounds present  EXTREMITIES:  2+ Peripheral Pulses, No clubbing, cyanosis, or edema, capillary refill < 2 seconds   NERVOUS SYSTEM:  Alert & Oriented X1, just to person ,  Motor Strength 5/5 B/L upper and lower extremities;  SKIN: No rashes or lesions , no abscess , no ulcers     Lab:                        12.5   10.1  )-----------( 234      ( 24 Aug 2017 05:48 )             36.3   08-23    140  |  103  |  42.0<H>  ----------------------------<  117<H>  4.7   |  21.0<L>  |  2.20<H>    Ca    9.6      23 Aug 2017 11:41    TPro  7.5  /  Alb  4.5  /  TBili  0.5  /  DBili  x   /  AST  33  /  ALT  10  /  AlkPhos  75  08-23    Urinalysis (08.23.17 @ 11:55)    Glucose Qualitative, Urine: Negative mg/dL    Blood, Urine: Moderate    pH Urine: 5.0    Color: Yellow    Urine Appearance: Clear    Bilirubin: Negative    Ketone - Urine: Negative    Specific Gravity: 1.015    Protein, Urine: Negative mg/dL    Urobilinogen: Negative mg/dL    Nitrite: Negative    Leukocyte Esterase Concentration: Negative           CT Chest No Cont (08.23.17 @ 18:48) >  IMPRESSION:    No acute CT pathology. Bilateral lower lobe interstitial fibrosis..        CT Head No Cont (08.23.17 @ 12:56) >  IMPRESSION:  Mildchronic microvascular changes without evidence of an   acute transcortical infarction or hemorrhage. MR is a more sensitive   imaging modality for the evaluation of an acute infarction.

## 2017-08-24 NOTE — PROGRESS NOTE ADULT - ASSESSMENT
Pt is a 88 yr old with PMH of vascular dementia, right eye blindness' ,  CHF, HTN, BPH  who was brought in by the EMS from his nursery home with a CC of change in mental status, as per staff member of his nursery home pt usually wake up in the morning and has breakfast, but today he had some unusual behavior , he was lethargy and confused . This pt has some clinical sing of dehydration, he is confused, his WBC was elevated and his WBC on previous admissions was normal , his kidney function shows elevated BNU and Cr most likely secondary  of acute dehydration and he was blood pressure was 100/50 mmhg . His Ct of head was negative for any acute changes  , CXR was clear. Pt is alert to place , no to time or person.      8/23 Pt wbc count went down from 12.8 to 10.1 this am, pt has been afebrile , denies sob no cough

## 2017-08-24 NOTE — PROGRESS NOTE ADULT - PROBLEM SELECTOR PLAN 3
- likely 2/2 to infection with WBC of 12.8   -pt has normal wbc count this morning   -f/u CBC in the am  -BCx and Urine culture are pending

## 2017-08-24 NOTE — PROGRESS NOTE ADULT - PROBLEM SELECTOR PLAN 4
-likely 2/2 to dehydration  -BUN and Cr are elevated uncleared for any chronic kidney disease previously   - pt had 2 lit of fluid in the ED will continue gentle hydration at 75 ml/hr for a total of 1000 ml.  -will trend BUN and Cr in the am, results are pending

## 2017-08-24 NOTE — PROGRESS NOTE ADULT - PROBLEM SELECTOR PLAN 5
-Hb = 12.3 with elevated MCV   - vit b12, folic acid, transferrin , reticulocyte count ,iron saturation , TIBC,  ordered

## 2017-08-24 NOTE — PROGRESS NOTE ADULT - PROBLEM SELECTOR PLAN 2
- pt had 2 lits of fluid in the ED   -strict in and out   -will repeat lab in the am  -hold home diuretic for now

## 2017-08-24 NOTE — PROGRESS NOTE ADULT - PROBLEM SELECTOR PLAN 6
-likely essential HTN  -pt blood pressure was low 100/50 mmhg  -pt on home dose of metoprolol ER 50 mg, will use metoprolol 25 mg bid for now  -will hold his diuretic and ramipril for now b/c pt has JESSE

## 2017-08-24 NOTE — PROGRESS NOTE ADULT - PROBLEM SELECTOR PLAN 7
-pt is not in acute CHF at this time   -daily weight  -strict in and out  -will hold his diuretics an ramipril for now   -start metoprolol 25 mg bib, will hold metoprolol ER 50 mg for now

## 2017-08-25 ENCOUNTER — TRANSCRIPTION ENCOUNTER (OUTPATIENT)
Age: 82
End: 2017-08-25

## 2017-08-25 LAB
ALBUMIN SERPL ELPH-MCNC: 3.6 G/DL — SIGNIFICANT CHANGE UP (ref 3.3–5.2)
ALP SERPL-CCNC: 66 U/L — SIGNIFICANT CHANGE UP (ref 40–120)
ALT FLD-CCNC: 28 U/L — SIGNIFICANT CHANGE UP
ANION GAP SERPL CALC-SCNC: 15 MMOL/L — SIGNIFICANT CHANGE UP (ref 5–17)
APPEARANCE UR: CLEAR — SIGNIFICANT CHANGE UP
AST SERPL-CCNC: 112 U/L — HIGH
BACTERIA # UR AUTO: ABNORMAL
BASOPHILS # BLD AUTO: 0 K/UL — SIGNIFICANT CHANGE UP (ref 0–0.2)
BASOPHILS NFR BLD AUTO: 0.1 % — SIGNIFICANT CHANGE UP (ref 0–2)
BILIRUB SERPL-MCNC: 0.5 MG/DL — SIGNIFICANT CHANGE UP (ref 0.4–2)
BILIRUB UR-MCNC: NEGATIVE — SIGNIFICANT CHANGE UP
BUN SERPL-MCNC: 25 MG/DL — HIGH (ref 8–20)
CALCIUM SERPL-MCNC: 8.9 MG/DL — SIGNIFICANT CHANGE UP (ref 8.6–10.2)
CHLORIDE SERPL-SCNC: 105 MMOL/L — SIGNIFICANT CHANGE UP (ref 98–107)
CO2 SERPL-SCNC: 21 MMOL/L — LOW (ref 22–29)
COLOR SPEC: YELLOW — SIGNIFICANT CHANGE UP
CREAT SERPL-MCNC: 1.26 MG/DL — SIGNIFICANT CHANGE UP (ref 0.5–1.3)
CULTURE RESULTS: SIGNIFICANT CHANGE UP
DIFF PNL FLD: ABNORMAL
EOSINOPHIL # BLD AUTO: 0 K/UL — SIGNIFICANT CHANGE UP (ref 0–0.5)
EOSINOPHIL NFR BLD AUTO: 0.4 % — SIGNIFICANT CHANGE UP (ref 0–5)
GGT SERPL-CCNC: 14 U/L — SIGNIFICANT CHANGE UP (ref 8–61)
GLUCOSE SERPL-MCNC: 105 MG/DL — SIGNIFICANT CHANGE UP (ref 70–115)
GLUCOSE UR QL: NEGATIVE MG/DL — SIGNIFICANT CHANGE UP
HCT VFR BLD CALC: 35.3 % — LOW (ref 42–52)
HGB BLD-MCNC: 12 G/DL — LOW (ref 14–18)
KETONES UR-MCNC: ABNORMAL
LEUKOCYTE ESTERASE UR-ACNC: NEGATIVE — SIGNIFICANT CHANGE UP
LYMPHOCYTES # BLD AUTO: 0.9 K/UL — LOW (ref 1–4.8)
LYMPHOCYTES # BLD AUTO: 10.6 % — LOW (ref 20–55)
MCHC RBC-ENTMCNC: 33.9 PG — HIGH (ref 27–31)
MCHC RBC-ENTMCNC: 34 G/DL — SIGNIFICANT CHANGE UP (ref 32–36)
MCV RBC AUTO: 99.7 FL — HIGH (ref 80–94)
MONOCYTES # BLD AUTO: 0.8 K/UL — SIGNIFICANT CHANGE UP (ref 0–0.8)
MONOCYTES NFR BLD AUTO: 10.3 % — HIGH (ref 3–10)
NEUTROPHILS # BLD AUTO: 6.3 K/UL — SIGNIFICANT CHANGE UP (ref 1.8–8)
NEUTROPHILS NFR BLD AUTO: 77.9 % — HIGH (ref 37–73)
NITRITE UR-MCNC: NEGATIVE — SIGNIFICANT CHANGE UP
PH UR: 5 — SIGNIFICANT CHANGE UP (ref 5–8)
PLATELET # BLD AUTO: 235 K/UL — SIGNIFICANT CHANGE UP (ref 150–400)
POTASSIUM SERPL-MCNC: 4.5 MMOL/L — SIGNIFICANT CHANGE UP (ref 3.5–5.3)
POTASSIUM SERPL-SCNC: 4.5 MMOL/L — SIGNIFICANT CHANGE UP (ref 3.5–5.3)
PROT SERPL-MCNC: 6.4 G/DL — LOW (ref 6.6–8.7)
PROT UR-MCNC: 15 MG/DL
RBC # BLD: 3.54 M/UL — LOW (ref 4.6–6.2)
RBC # FLD: 12.3 % — SIGNIFICANT CHANGE UP (ref 11–15.6)
RBC CASTS # UR COMP ASSIST: SIGNIFICANT CHANGE UP /HPF (ref 0–4)
SODIUM SERPL-SCNC: 141 MMOL/L — SIGNIFICANT CHANGE UP (ref 135–145)
SP GR SPEC: 1.01 — SIGNIFICANT CHANGE UP (ref 1.01–1.02)
UROBILINOGEN FLD QL: NEGATIVE MG/DL — SIGNIFICANT CHANGE UP
WBC # BLD: 8.1 K/UL — SIGNIFICANT CHANGE UP (ref 4.8–10.8)
WBC # FLD AUTO: 8.1 K/UL — SIGNIFICANT CHANGE UP (ref 4.8–10.8)
WBC UR QL: SIGNIFICANT CHANGE UP

## 2017-08-25 PROCEDURE — 99233 SBSQ HOSP IP/OBS HIGH 50: CPT

## 2017-08-25 RX ORDER — METOPROLOL TARTRATE 50 MG
1 TABLET ORAL
Qty: 0 | Refills: 0 | COMMUNITY

## 2017-08-25 RX ORDER — AZITHROMYCIN 500 MG/1
1 TABLET, FILM COATED ORAL
Qty: 3 | Refills: 0 | OUTPATIENT
Start: 2017-08-25 | End: 2017-08-28

## 2017-08-25 RX ORDER — TAMSULOSIN HYDROCHLORIDE 0.4 MG/1
1 CAPSULE ORAL
Qty: 0 | Refills: 0 | COMMUNITY

## 2017-08-25 RX ORDER — SACCHAROMYCES BOULARDII 250 MG
1 POWDER IN PACKET (EA) ORAL
Qty: 0 | Refills: 0 | COMMUNITY
Start: 2017-08-25

## 2017-08-25 RX ADMIN — DONEPEZIL HYDROCHLORIDE 5 MILLIGRAM(S): 10 TABLET, FILM COATED ORAL at 21:16

## 2017-08-25 RX ADMIN — TAMSULOSIN HYDROCHLORIDE 0.4 MILLIGRAM(S): 0.4 CAPSULE ORAL at 21:16

## 2017-08-25 NOTE — PROGRESS NOTE ADULT - PROBLEM SELECTOR PLAN 7
-pt is not in acute CHF at this time   -daily weight  -strict in and out  -pt on metoprolol Xl 25 mg daily and lisinopril 5 mg daily  -furosemide 20 mg every 48 hrs

## 2017-08-25 NOTE — DISCHARGE NOTE ADULT - CARE PROVIDER_API CALL
[facility]  Phone: (   )    -  Fax: (   )    - [facility]  Medical staff  Phone: (   )    -  Fax: (   )    -

## 2017-08-25 NOTE — DISCHARGE NOTE ADULT - PLAN OF CARE
-resolved -patient has a baseline of Vascular dementia resolved -keep patient hydrate avoid any exacerbation -patient is no in CHF exacerbation   -continue treatment with metoprolol ER 50 mg, furosemide 20 , Spironolactone 25 mg and ramipril 5 mg once a day -patient received 3 lit of fluid while he was in the Emergency department   -he was treated with gentle hydration   -continue oral hydration with plenty of fluid -continue treatment with donepezil 10 mg daily keep blood pressure < 150/90 mmhg - diet DASH , low salt and low fat  -continue treatment with metoprolol ER 50 mg, furosemide 20 , Spironolactone 25 mg and ramipril 5 mg once a day -continue treatment with Tamsulosin daily avoid progression -patient has a baseline of Vascular dementia  --patient need a rolling walker ( 5 inch wheels ) as recommended by Physical therapy team -keep patient hydrate  -evaluated his in an out daily -patient is no in CHF exacerbation   -continue treatment with metoprolol ER 50 mg, furosemide 20 , Spironolactone 25 mg and ramipril 5 mg once a day  -evaluated his in an out daily -patient received 3 lit of fluid while he was in the Emergency department   -he was treated with gentle hydration   -continue oral hydration with plenty of fluids   -continue oral hydration with plenty of fluid -most likely secondary to infection and/or dehydration,   -patient  needs 3 more days of treatment with Azithromycin to complete a total of 5 days    -patient has a baseline of Vascular dementia  --patient need a rolling walker ( 5 inch wheels ) as recommended by Physical therapy team -most likely secondary to infection and/or dehydration,   -patient has a baseline of Vascular dementia  --patient need a rolling walker ( 5 inch wheels ) as recommended by Physical therapy team avoid any exacerbation, compensate -keep patient hydrate  -continue with oral hydration   -evaluated his in an out daily -patient received 3 lit of fluid while he was in the Emergency department and then was treated with gentle IV hydration   -continue oral hydration with plenty of fluids -patient is no in CHF exacerbation, euvolemic, good urine output, weight is stable, BUN/Cr is ok  -continue treatment with current meds  -evaluated his in an out daily -most likely secondary to infection and/or dehydration,   -patient has a baseline of Vascular dementia  --patient need a rolling walker ( 5 inch wheels ) as recommended by Physical therapy team  -continue home meds - diet DASH , low salt and low fat  -continue treatment with metoprolol ER 50 mg, furosemide 20  every other day, and ramipril 5 mg once a day -keep patient hydrate  -continue with oral hydration   -evaluated his in an out daily  -lasix every other day and taken off of aldactone. lasix to be maintained as long as patient is eating well   -ensure added while here -most likely secondary to dehydration, infection ruled out with negative studies   -patient has a baseline of Vascular dementia  --patient need a rolling walker ( 5 inch wheels ) as recommended by Physical therapy team  -continue home meds - with modifications as above. DIET - mechanical soft

## 2017-08-25 NOTE — DISCHARGE NOTE ADULT - INSTRUCTIONS
diet DASH low salt and low fat diet DASH low salt and low fat  -continue treatment with all medications ( furosemide -metoprolol- donepezil- spironolactone- potassium- ramipril -tamsulosin)  -follow up with medical team in his nursery home  -patient need a rolling walker ( 5 inch wheels ) as recommended by Physical therapy team diet DASH low salt and low fat  -continue treatment with all medications ( furosemide -metoprolol- donepezil- spironolactone- potassium- ramipril -tamsulosin)  -follow up with medical team in his nursery home  -patient  needs 3 more days of treatment with Azithromycin to complete a total of 5 days   -patient need a rolling walker ( 5 inch wheels ) as recommended by Physical therapy team -diet DASH low salt and low fat  -continue treatment with all medications ( furosemide -metoprolol- donepezil- spironolactone- potassium- ramipril -tamsulosin)  -follow up with medical team in his nursery home  -patient need a rolling walker ( 5 inch wheels ) as recommended by Physical therapy team

## 2017-08-25 NOTE — DIETITIAN INITIAL EVALUATION ADULT. - PROBLEM SELECTOR PLAN 2
- pt had 2 lits of fluid in the ED will continue gentle hydration at 75 ml / hr  -strict in and out   -will repeat lab in the am  -hold home diuretic for now

## 2017-08-25 NOTE — PROGRESS NOTE ADULT - PROBLEM SELECTOR PLAN 4
-likely 2/2 to dehydration  -BUN and Cr are elevated uncleared for any chronic kidney disease previously   - pt had 2 lit of fluid in the ED  -Cr and BUN trending down

## 2017-08-25 NOTE — DISCHARGE NOTE ADULT - CARE PLAN
Principal Discharge DX:	Acute encephalopathy  Secondary Diagnosis:	JESSE (acute kidney injury)  Secondary Diagnosis:	CHF (congestive heart failure)  Secondary Diagnosis:	Dehydration  Secondary Diagnosis:	Dementia  Secondary Diagnosis:	Hypertension Principal Discharge DX:	Acute encephalopathy  Goal:	-resolved  Instructions for follow-up, activity and diet:	-patient has a baseline of Vascular dementia  Secondary Diagnosis:	JESSE (acute kidney injury)  Goal:	resolved  Instructions for follow-up, activity and diet:	-keep patient hydrate  Secondary Diagnosis:	CHF (congestive heart failure)  Goal:	avoid any exacerbation  Instructions for follow-up, activity and diet:	-patient is no in CHF exacerbation   -continue treatment with metoprolol ER 50 mg, furosemide 20 , Spironolactone 25 mg and ramipril 5 mg once a day  Secondary Diagnosis:	Dehydration  Goal:	resolved  Instructions for follow-up, activity and diet:	-patient received 3 lit of fluid while he was in the Emergency department   -he was treated with gentle hydration   -continue oral hydration with plenty of fluid  Secondary Diagnosis:	Dementia  Instructions for follow-up, activity and diet:	-continue treatment with donepezil 10 mg daily  Secondary Diagnosis:	Hypertension  Goal:	keep blood pressure < 150/90 mmhg  Instructions for follow-up, activity and diet:	- diet DASH , low salt and low fat  -continue treatment with metoprolol ER 50 mg, furosemide 20 , Spironolactone 25 mg and ramipril 5 mg once a day Principal Discharge DX:	Acute encephalopathy  Goal:	-resolved  Instructions for follow-up, activity and diet:	-patient has a baseline of Vascular dementia  --patient need a rolling walker ( 5 inch wheels ) as recommended by Physical therapy team  Secondary Diagnosis:	JESSE (acute kidney injury)  Goal:	resolved  Instructions for follow-up, activity and diet:	-keep patient hydrate  Secondary Diagnosis:	CHF (congestive heart failure)  Goal:	avoid any exacerbation  Instructions for follow-up, activity and diet:	-patient is no in CHF exacerbation   -continue treatment with metoprolol ER 50 mg, furosemide 20 , Spironolactone 25 mg and ramipril 5 mg once a day  Secondary Diagnosis:	Dehydration  Goal:	resolved  Instructions for follow-up, activity and diet:	-patient received 3 lit of fluid while he was in the Emergency department   -he was treated with gentle hydration   -continue oral hydration with plenty of fluid  Secondary Diagnosis:	Dementia  Goal:	avoid progression  Instructions for follow-up, activity and diet:	-continue treatment with donepezil 10 mg daily  Secondary Diagnosis:	Hypertension  Goal:	keep blood pressure < 150/90 mmhg  Instructions for follow-up, activity and diet:	- diet DASH , low salt and low fat  -continue treatment with metoprolol ER 50 mg, furosemide 20 , Spironolactone 25 mg and ramipril 5 mg once a day  Secondary Diagnosis:	BPH (benign prostatic hyperplasia)  Instructions for follow-up, activity and diet:	-continue treatment with Tamsulosin daily Principal Discharge DX:	Acute encephalopathy  Goal:	-resolved  Instructions for follow-up, activity and diet:	-most likely secondary to infection and/or dehydration,   -patient  needs 3 more days of treatment with Azithromycin to complete a total of 5 days    -patient has a baseline of Vascular dementia  --patient need a rolling walker ( 5 inch wheels ) as recommended by Physical therapy team  Secondary Diagnosis:	JESSE (acute kidney injury)  Goal:	resolved  Instructions for follow-up, activity and diet:	-keep patient hydrate  -evaluated his in an out daily  Secondary Diagnosis:	CHF (congestive heart failure)  Goal:	avoid any exacerbation  Instructions for follow-up, activity and diet:	-patient is no in CHF exacerbation   -continue treatment with metoprolol ER 50 mg, furosemide 20 , Spironolactone 25 mg and ramipril 5 mg once a day  -evaluated his in an out daily  Secondary Diagnosis:	Dehydration  Goal:	resolved  Instructions for follow-up, activity and diet:	-patient received 3 lit of fluid while he was in the Emergency department   -he was treated with gentle hydration   -continue oral hydration with plenty of fluids   -continue oral hydration with plenty of fluid  Secondary Diagnosis:	Dementia  Goal:	avoid progression  Instructions for follow-up, activity and diet:	-continue treatment with donepezil 10 mg daily  Secondary Diagnosis:	Hypertension  Goal:	keep blood pressure < 150/90 mmhg  Instructions for follow-up, activity and diet:	- diet DASH , low salt and low fat  -continue treatment with metoprolol ER 50 mg, furosemide 20 , Spironolactone 25 mg and ramipril 5 mg once a day  Secondary Diagnosis:	BPH (benign prostatic hyperplasia)  Instructions for follow-up, activity and diet:	-continue treatment with Tamsulosin daily Principal Discharge DX:	Acute encephalopathy  Goal:	-resolved  Instructions for follow-up, activity and diet:	-most likely secondary to infection and/or dehydration,   -patient has a baseline of Vascular dementia  --patient need a rolling walker ( 5 inch wheels ) as recommended by Physical therapy team  Secondary Diagnosis:	JESSE (acute kidney injury)  Goal:	resolved  Instructions for follow-up, activity and diet:	-keep patient hydrate  -evaluated his in an out daily  Secondary Diagnosis:	CHF (congestive heart failure)  Goal:	avoid any exacerbation  Instructions for follow-up, activity and diet:	-patient is no in CHF exacerbation   -continue treatment with metoprolol ER 50 mg, furosemide 20 , Spironolactone 25 mg and ramipril 5 mg once a day  -evaluated his in an out daily  Secondary Diagnosis:	Dehydration  Goal:	resolved  Instructions for follow-up, activity and diet:	-patient received 3 lit of fluid while he was in the Emergency department   -he was treated with gentle hydration   -continue oral hydration with plenty of fluids   -continue oral hydration with plenty of fluid  Secondary Diagnosis:	Dementia  Goal:	avoid progression  Instructions for follow-up, activity and diet:	-continue treatment with donepezil 10 mg daily  Secondary Diagnosis:	Hypertension  Goal:	keep blood pressure < 150/90 mmhg  Instructions for follow-up, activity and diet:	- diet DASH , low salt and low fat  -continue treatment with metoprolol ER 50 mg, furosemide 20 , Spironolactone 25 mg and ramipril 5 mg once a day  Secondary Diagnosis:	BPH (benign prostatic hyperplasia)  Instructions for follow-up, activity and diet:	-continue treatment with Tamsulosin daily Principal Discharge DX:	Acute encephalopathy  Goal:	-resolved  Instructions for follow-up, activity and diet:	-most likely secondary to infection and/or dehydration,   -patient has a baseline of Vascular dementia  --patient need a rolling walker ( 5 inch wheels ) as recommended by Physical therapy team  Secondary Diagnosis:	JESSE (acute kidney injury)  Goal:	resolved  Instructions for follow-up, activity and diet:	-keep patient hydrate  -continue with oral hydration   -evaluated his in an out daily  Secondary Diagnosis:	CHF (congestive heart failure)  Goal:	avoid any exacerbation, compensate  Instructions for follow-up, activity and diet:	-patient is no in CHF exacerbation   -continue treatment with metoprolol ER 50 mg, furosemide 20 , Spironolactone 25 mg and ramipril 5 mg once a day  -evaluated his in an out daily  Secondary Diagnosis:	Dehydration  Goal:	resolved  Instructions for follow-up, activity and diet:	-patient received 3 lit of fluid while he was in the Emergency department and then was treated with gentle IV hydration   -continue oral hydration with plenty of fluids  Secondary Diagnosis:	Dementia  Goal:	avoid progression  Instructions for follow-up, activity and diet:	-continue treatment with donepezil 10 mg daily  Secondary Diagnosis:	Hypertension  Goal:	keep blood pressure < 150/90 mmhg  Instructions for follow-up, activity and diet:	- diet DASH , low salt and low fat  -continue treatment with metoprolol ER 50 mg, furosemide 20 , Spironolactone 25 mg and ramipril 5 mg once a day  Secondary Diagnosis:	BPH (benign prostatic hyperplasia)  Instructions for follow-up, activity and diet:	-continue treatment with Tamsulosin daily Principal Discharge DX:	Acute encephalopathy  Goal:	-resolved  Instructions for follow-up, activity and diet:	-most likely secondary to infection and/or dehydration,   -patient has a baseline of Vascular dementia  --patient need a rolling walker ( 5 inch wheels ) as recommended by Physical therapy team  -continue home meds  Secondary Diagnosis:	JESSE (acute kidney injury)  Goal:	resolved  Instructions for follow-up, activity and diet:	-keep patient hydrate  -continue with oral hydration   -evaluated his in an out daily  Secondary Diagnosis:	CHF (congestive heart failure)  Goal:	avoid any exacerbation, compensate  Instructions for follow-up, activity and diet:	-patient is no in CHF exacerbation, euvolemic, good urine output, weight is stable, BUN/Cr is ok  -continue treatment with current meds  -evaluated his in an out daily  Secondary Diagnosis:	Dehydration  Goal:	resolved  Instructions for follow-up, activity and diet:	-patient received 3 lit of fluid while he was in the Emergency department and then was treated with gentle IV hydration   -continue oral hydration with plenty of fluids  Secondary Diagnosis:	Dementia  Goal:	avoid progression  Instructions for follow-up, activity and diet:	-continue treatment with donepezil 10 mg daily  Secondary Diagnosis:	Hypertension  Goal:	keep blood pressure < 150/90 mmhg  Instructions for follow-up, activity and diet:	- diet DASH , low salt and low fat  -continue treatment with metoprolol ER 50 mg, furosemide 20 , Spironolactone 25 mg and ramipril 5 mg once a day  Secondary Diagnosis:	BPH (benign prostatic hyperplasia)  Instructions for follow-up, activity and diet:	-continue treatment with Tamsulosin daily Principal Discharge DX:	Acute encephalopathy  Goal:	-resolved  Instructions for follow-up, activity and diet:	-most likely secondary to dehydration, infection ruled out with negative studies   -patient has a baseline of Vascular dementia  --patient need a rolling walker ( 5 inch wheels ) as recommended by Physical therapy team  -continue home meds - with modifications as above. DIET - mechanical soft  Secondary Diagnosis:	JESSE (acute kidney injury)  Goal:	resolved  Instructions for follow-up, activity and diet:	-keep patient hydrate  -continue with oral hydration   -evaluated his in an out daily  -lasix every other day and taken off of aldactone. lasix to be maintained as long as patient is eating well   -ensure added while here  Secondary Diagnosis:	CHF (congestive heart failure)  Goal:	avoid any exacerbation, compensate  Instructions for follow-up, activity and diet:	-patient is no in CHF exacerbation, euvolemic, good urine output, weight is stable, BUN/Cr is ok  -continue treatment with current meds  -evaluated his in an out daily  Secondary Diagnosis:	Dehydration  Goal:	resolved  Instructions for follow-up, activity and diet:	-patient received 3 lit of fluid while he was in the Emergency department and then was treated with gentle IV hydration   -continue oral hydration with plenty of fluids  Secondary Diagnosis:	Dementia  Goal:	avoid progression  Instructions for follow-up, activity and diet:	-continue treatment with donepezil 10 mg daily  Secondary Diagnosis:	Hypertension  Goal:	keep blood pressure < 150/90 mmhg  Instructions for follow-up, activity and diet:	- diet DASH , low salt and low fat  -continue treatment with metoprolol ER 50 mg, furosemide 20  every other day, and ramipril 5 mg once a day  Secondary Diagnosis:	BPH (benign prostatic hyperplasia)  Instructions for follow-up, activity and diet:	-continue treatment with Tamsulosin daily

## 2017-08-25 NOTE — DISCHARGE NOTE ADULT - MEDICATION SUMMARY - MEDICATIONS TO CHANGE
I will SWITCH the dose or number of times a day I take the medications listed below when I get home from the hospital:  None I will SWITCH the dose or number of times a day I take the medications listed below when I get home from the hospital:    metoprolol succinate 50 mg oral tablet, extended release  -- 1 tab(s) by mouth once a day    potassium chloride  --  by mouth    Lasix 20 mg oral tablet  -- 1 tab(s) by mouth once a day

## 2017-08-25 NOTE — DISCHARGE NOTE ADULT - MEDICATION SUMMARY - MEDICATIONS TO STOP TAKING
I will STOP taking the medications listed below when I get home from the hospital:  None I will STOP taking the medications listed below when I get home from the hospital:    spironolactone 25 mg oral tablet  --  by mouth

## 2017-08-25 NOTE — DISCHARGE NOTE ADULT - PROVIDER TOKENS
FREE:[LAST:[Adult care],FIRST:[[place]],PHONE:[(   )    -],FAX:[(   )    -],ADDRESS:[Follow up with medical team in his nursery home]] FREE:[LAST:[[place]],FIRST:[Home],PHONE:[(   )    -],FAX:[(   )    -],ADDRESS:[Medical staff]]

## 2017-08-25 NOTE — DIETITIAN INITIAL EVALUATION ADULT. - PROBLEM SELECTOR PLAN 4
-likely 2/2 to dehydration  -BUN and Cr are elevated uncleared for any chronic kidney disease previously   - pt had 2 lit of fluid in the ED will continue gentle hydration at 75 ml/hr  -will trend BUN and Cr in the am

## 2017-08-25 NOTE — PROGRESS NOTE ADULT - PROBLEM SELECTOR PLAN 1
-likely secondary to infectious etiology vs dehydration, pt with pmh of dementia  -follow up of mental status   -wbc count is trending down

## 2017-08-25 NOTE — DISCHARGE NOTE ADULT - OTHER SIGNIFICANT FINDINGS
< from: US Renal (08.24.17 @ 18:24) >    INTERPRETATION:  CLINICAL INFORMATION: Acute kidney injury    COMPARISON: None available.    TECHNIQUE: Sonography of the kidneys and bladder.     FINDINGS:  Study is technically limited secondary to combative nature of the patient.    Right kidney:  9.3 x 4.4 x 4.1 cm. No renal mass, hydronephrosis or   calculi. Renal cortex is echogenic.    Left kidney:  7.4 x 4.9 x 4 point cm. No renal mass, hydronephrosis or   calculi. Renal cortex is echogenic.    Urinary bladder: 107 cc of fluid    IMPRESSION:     Medical renal disease. No hydronephrosis.        < end of copied text >  < from: Xray Chest 1 View AP/PA. (08.23.17 @ 10:25) >    INTERPRETATION:  History: Dementia and confusion    Technique:  AP chest    Comparisons:  none    Findings: The lungs are clear. There are no infiltrates, congestion or   pleural effusions. The pulmonary vasculature and aorta are normal for   age. Heart size is unremarkable. The thorax is normal for age.    Impression: No acute pulmonary disease.      < end of copied text >  < from: TTE Echo Complete w/Doppler (08.24.17 @ 18:34) >  PHYSICIAN INTERPRETATION:  Left Ventricle: The left ventricular internal cavity size is normal.  Global LV systolic function was normal. Left ventricular ejection   fraction, by visual estimation, is 60 to 65%. Spectral Doppler shows   impaired relaxation patternof left ventricular myocardial filling (Grade   I diastolic dysfunction). Endocardial border not well visualized consider   definity study if clinically indciated.  Right Ventricle: The right ventricular size is normal. RV systolic   function is normal.  Left Atrium: Mildly enlarged left atrium.  Right Atrium: The right atrium is normal in size.  Pericardium: There is no evidence of pericardial effusion.  Mitral Valve: The mitral valve is not well seen. There is mild mitral   annular calcification. Trace mitral valve regurgitation is seen.  Tricuspid Valve: The tricuspid valve is not well seen.  Aortic Valve: The aortic valve was not well visualized.  Pulmonic Valve: The pulmonic valve was not well visualized.  Aorta: The aortic root is normal in size and structure. The ascending   aorta was not well visualized.  Pulmonary Artery: The pulmonary artery is not well seen.  Venous: The pulmonary veins were not well visualized. The inferior vena   cava is normal.        Summary:   1. Left ventricular ejection fraction, by visual estimation, is 60 to   65%.   2. Normal global left ventricular systolic function.   3. Spectral Doppler shows impaired relaxation pattern of left   ventricular myocardial filling (Grade I diastolic dysfunction).  4. Mild mitral annular calcification.   5. Mildly enlarged left atrium.    < end of copied text > 12.8   7.4   )-----------( 257      ( 27 Aug 2017 09:11 )             38.5   08-27    144  |  108<H>  |  23.0<H>  ----------------------------<  93  4.1   |  23.0  |  1.21    Ca    9.3      27 Aug 2017 09:11    TPro  6.6  /  Alb  3.3  /  TBili  0.5  /  DBili  x   /  AST  54<H>  /  ALT  26  /  AlkPhos  62  08-27     US Renal (08.24.17 @ 18:24) >    INTERPRETATION:  CLINICAL INFORMATION: Acute kidney injury    COMPARISON: None available.    TECHNIQUE: Sonography of the kidneys and bladder.     FINDINGS:  Study is technically limited secondary to combative nature of the patient.    Right kidney:  9.3 x 4.4 x 4.1 cm. No renal mass, hydronephrosis or   calculi. Renal cortex is echogenic.    Left kidney:  7.4 x 4.9 x 4 point cm. No renal mass, hydronephrosis or   calculi. Renal cortex is echogenic.    Urinary bladder: 107 cc of fluid    IMPRESSION:     Medical renal disease. No hydronephrosis.        < end of copied text >  < from: Xray Chest 1 View AP/PA. (08.23.17 @ 10:25) >    INTERPRETATION:  History: Dementia and confusion    Technique:  AP chest    Comparisons:  none    Findings: The lungs are clear. There are no infiltrates, congestion or   pleural effusions. The pulmonary vasculature and aorta are normal for   age. Heart size is unremarkable. The thorax is normal for age.    Impression: No acute pulmonary disease.      < end of copied text >  < from: TTE Echo Complete w/Doppler (08.24.17 @ 18:34) >  PHYSICIAN INTERPRETATION:  Left Ventricle: The left ventricular internal cavity size is normal.  Global LV systolic function was normal. Left ventricular ejection   fraction, by visual estimation, is 60 to 65%. Spectral Doppler shows   impaired relaxation patternof left ventricular myocardial filling (Grade   I diastolic dysfunction). Endocardial border not well visualized consider   definity study if clinically indciated.  Right Ventricle: The right ventricular size is normal. RV systolic   function is normal.  Left Atrium: Mildly enlarged left atrium.  Right Atrium: The right atrium is normal in size.  Pericardium: There is no evidence of pericardial effusion.  Mitral Valve: The mitral valve is not well seen. There is mild mitral   annular calcification. Trace mitral valve regurgitation is seen.  Tricuspid Valve: The tricuspid valve is not well seen.  Aortic Valve: The aortic valve was not well visualized.  Pulmonic Valve: The pulmonic valve was not well visualized.  Aorta: The aortic root is normal in size and structure. The ascending   aorta was not well visualized.  Pulmonary Artery: The pulmonary artery is not well seen.  Venous: The pulmonary veins were not well visualized. The inferior vena   cava is normal.        Summary:   1. Left ventricular ejection fraction, by visual estimation, is 60 to   65%.   2. Normal global left ventricular systolic function.   3. Spectral Doppler shows impaired relaxation pattern of left   ventricular myocardial filling (Grade I diastolic dysfunction).  4. Mild mitral annular calcification.   5. Mildly enlarged left atrium.    < end of copied text >

## 2017-08-25 NOTE — DISCHARGE NOTE ADULT - ADDITIONAL INSTRUCTIONS
diet DASH low salt and low fat  -continue treatment with all medications ( furosemide -metoprolol- donepezil- spironolactone- potassium- ramipril -tamsulosin)  -follow up with medical team in his nursery home  -patient need a rolling walker ( 5 inch wheels ) as recommended by Physical therapy team diet DASH low salt and low fat  --patient  needs 3 more days of treatment with Azithromycin to complete a total of 5 days   -continue treatment with all medications ( furosemide -metoprolol- donepezil- spironolactone- potassium- ramipril -tamsulosin)  -follow up with medical team in his nursery home  -patient need a rolling walker ( 5 inch wheels ) as recommended by Physical therapy team diet DASH low salt and low fat  -continue treatment with all medications ( furosemide -metoprolol- donepezil- - potassium- ramipril -tamsulosin)  -follow up with medical team in his nursery home  -patient need a rolling walker ( 5 inch wheels ) as recommended by Physical therapy team

## 2017-08-25 NOTE — DISCHARGE NOTE ADULT - NS AS ACTIVITY OBS
No Heavy lifting/straining/Do not drive or operate machinery Do not make important decisions/No Heavy lifting/straining/Bathing allowed/Do not drive or operate machinery

## 2017-08-25 NOTE — DISCHARGE NOTE ADULT - MEDICATION SUMMARY - MEDICATIONS TO TAKE
I will START or STAY ON the medications listed below when I get home from the hospital:    spironolactone 25 mg oral tablet  --  by mouth   -- Indication: For CHF (congestive heart failure)    ramipril 5 mg oral capsule  -- 1 cap(s) by mouth once a day  -- Indication: For Hypertension    tamsulosin 0.4 mg oral capsule  -- 1 cap(s) by mouth once a day (at bedtime)  -- Indication: For BPH (benign prostatic hyperplasia)    risperiDONE 0.25 mg oral tablet  -- 1 tab(s) by mouth once a day  -- Indication: For Dementia    donepezil 5 mg oral tablet  -- 1 tab(s) by mouth once a day (at bedtime)  -- Indication: For Dementia    Lasix 20 mg oral tablet  -- 1 tab(s) by mouth once a day  -- Indication: For CHF (congestive heart failure)    Colace 100 mg oral capsule  -- 1 cap(s) by mouth once a day  -- Indication: For Constipation     potassium chloride  --  by mouth   -- Indication: For CHF (congestive heart failure)    saccharomyces boulardii lyo 250 mg oral capsule  -- 1 cap(s) by mouth 2 times a day  -- Indication: For infection I will START or STAY ON the medications listed below when I get home from the hospital:    dme  -- 1 application rolling walker ( 5 inch wheels) difficulty walking ICD 10 R26.2  -- Indication: For gait instability    ramipril 5 mg oral capsule  -- 1 cap(s) by mouth once a day  -- Indication: For Hypertension    tamsulosin 0.4 mg oral capsule  -- 1 cap(s) by mouth once a day (at bedtime)  -- Indication: For BPH (benign prostatic hyperplasia)    risperiDONE 0.25 mg oral tablet  -- 1 tab(s) by mouth once a day  -- Indication: For Dementia    metoprolol succinate 25 mg oral tablet, extended release  -- 1 tab(s) by mouth once a day  -- Indication: For Hypertension    donepezil 5 mg oral tablet  -- 1 tab(s) by mouth once a day (at bedtime)  -- Indication: For Dementia    Lasix 20 mg oral tablet  -- 1 tab(s) by mouth every 48 hours  -- Indication: For CHF (congestive heart failure)    Colace 100 mg oral capsule  -- 1 cap(s) by mouth once a day  -- Indication: For Constipation     potassium chloride 10 mEq oral tablet, extended release  -- 1 tab(s) by mouth every 48 hours  -- It is very important that you take or use this exactly as directed.  Do not skip doses or discontinue unless directed by your doctor.  Medication should be taken with plenty of water.  Take with food or milk.    -- Indication: For CHF (congestive heart failure)    ocular lubricant ophthalmic solution  -- 1 drop(s) to each affected eye 2 times a day  -- Indication: For eye dryness    saccharomyces boulardii lyo 250 mg oral capsule  -- 1 cap(s) by mouth 2 times a day  -- Indication: For infection I will START or STAY ON the medications listed below when I get home from the hospital:    dme  -- 1 application rolling walker ( 5 inch wheels) difficulty walking ICD 10 R26.2  -- Indication: For gait instability    ramipril 5 mg oral capsule  -- 1 cap(s) by mouth once a day  -- Indication: For Hypertension    tamsulosin 0.4 mg oral capsule  -- 1 cap(s) by mouth once a day (at bedtime)  -- Indication: For BPH (benign prostatic hyperplasia)    risperiDONE 0.25 mg oral tablet  -- 1 tab(s) by mouth once a day  -- Indication: For Dementia    metoprolol succinate 25 mg oral tablet, extended release  -- 1 tab(s) by mouth once a day  -- Indication: For Hypertension    donepezil 5 mg oral tablet  -- 1 tab(s) by mouth once a day (at bedtime)  -- Indication: For Dementia    Lasix 20 mg oral tablet  -- 1 tab(s) by mouth every 48 hours  -- Indication: For CHF (congestive heart failure)    Colace 100 mg oral capsule  -- 1 cap(s) by mouth once a day  -- Indication: For Constipation     potassium chloride 10 mEq oral tablet, extended release  -- 1 tab(s) by mouth every 48 hours  -- It is very important that you take or use this exactly as directed.  Do not skip doses or discontinue unless directed by your doctor.  Medication should be taken with plenty of water.  Take with food or milk.    -- Indication: For CHF (congestive heart failure)    ocular lubricant ophthalmic solution  -- 1 drop(s) to each affected eye 2 times a day  -- Indication: For eye dryness

## 2017-08-25 NOTE — DISCHARGE NOTE ADULT - HOSPITAL COURSE
Pt is an 88 yr old M with PMH of HTN, BPH, Dementia, CHF and blindness of right eye , pt is a poor historian . Pt was brought in by the EMS. As per EMS and staff from the nursery  home pt normally gets out of bed daily  and comes to breakfast but today he did not get out of bed, he seemed more lethargic, more confused than usual, on his baseline pt is able to ambulate and eat by himself. The day prior to this event pt was having his normal behavior, the  staff from the nursery home do not exactly when he was last time he was know well.  On  vitals signs taken by the EMS pt was hypotensive with bp= 100/50 mmhg ,he denies chest pain , palpitations , headache , no diarrhea or abdominal pain , dizziness , no calf tenderness, no recent travel. Pt is an 88 yr old M with PMH of HTN, BPH, Dementia, CHF and blindness of right eye , pt is a poor historian . Pt was brought in by the EMS. As per EMS and staff from the nursery  home pt normally gets out of bed daily  and comes to breakfast but the day of his admission  he did not get out of bed, he was more lethargic, more confused than usual, on his baseline pt is able to ambulate and eat by himself. The day prior to this event pt was having his normal behavior. Due to his altered mental status a CT of head was ordered and did not shows any acute conditions such as ischemic or hemorraghic stroke, but showed microvascular changes. On admission his dihydrated with low blood pressure and dry mucous membranes, he was treated with 3 lit of fluid and gently hydration , his WBC count  , lactate and procalcitonin were elevated , urinalaysis was contaminated , blood cultures were negative. His CT scan of chest did not shows pneumonia but was positive for lower lobe interstitial fibrosis, his Echocardiogram showed EF of 65 % with Grade I diastolic disfunction.    Pt was treated with IV antibiotic Rocephin and Azithromycin for a total of 2 days. During his hospital course he was afebrile , blood pressure and heart rater were within normal limits. Pt was evaluated for Physical therapy and  they recommended a rolling walker for him. Pt need to complete his treatment with Azithromycin for a total of 5 days , will send medication for 3 more days. Pt is an 88 yr old M with PMH of HTN, BPH, Dementia, CHF and blindness of right eye, pt is a poor historian . Pt was brought in by the EMS. As per EMS and staff from the nursery  home pt normally gets out of bed daily and comes to breakfast but the day of his admission  he did not get out of bed, he was more lethargic, more confused than usual, on his baseline pt is able to ambulate and eat by himself. The day prior to this event pt was having his normal behavior. Due to his altered mental status a CT of head was ordered and did not shows any acute conditions such as ischemic or hemorraghic stroke, but showed microvascular changes. On admission his dihydrated with low blood pressure and dry mucous membranes, he was treated with 3 lit of fluid and gently hydration , his WBC count  , lactate and procalcitonin were elevated, urinalaysis was contaminated , blood cultures were negative. His CT scan of chest did not shows pneumonia but was positive for lower lobe interstitial fibrosis, his Echocardiogram showed EF of 65% with Grade I diastolic disfunction. Pt was treated with IV antibiotic Rocephin and Azithromycin for a total of 2 days. Treatment was stopped when there was no evidence of infection (afebrile, minimal leucocytosis, negative cultures, negative imaging. During his hospital course he was afebrile, blood pressure and heart rate were within normal limits. Pt was evaluated for Physical therapy and  they recommended a rolling walker. Pt is medically optimized for discharge. Amadeo is an 88 yr old M with PMH of HTN, BPH, Dementia, CHF and blindness of right eye, pt is a poor historian .  The day prior to this event pt was having his normal behavior. Due to his altered mental status a CT of head was ordered and did not shows any acute conditions such as ischemic or hemorraghic stroke, but showed microvascular changes. On admission his dihydrated with low blood pressure and dry mucous membranes, he was treated with 3 lit of fluid and gently hydration , his WBC count  , lactate and procalcitonin were elevated, urinalaysis was contaminated , blood cultures were negative. His CT scan of chest did not shows pneumonia but was positive for lower lobe interstitial fibrosis, his Echocardiogram showed EF of 65% with Grade I diastolic disfunction. Pt was admitted due to acute encephalopathy 2/2 to infection or dehydration , he was treated with IV antibiotic Rocephin and Azithromycin for a total of 2 days and with IV fluids . Treatment was stopped when there was no evidence of infection (afebrile, minimal leucocytosis, negative cultures, negative imaging. During his hospital course he was afebrile, blood pressure and heart rate were within normal limits. Pt was evaluated for Physical therapy and  they recommended a rolling walker. Pt is medically optimized for discharge. Pt is an 88 yr old M with PMH of HTN, BPH, Dementia, CHF and blindness of right eye, pt is a poor historian .  The day prior to this event pt was having his normal behavior. Due to his altered mental status a CT of head was ordered and did not shows any acute conditions such as ischemic or hemorraghic stroke, but showed microvascular changes. On admission his dihydrated with low blood pressure and dry mucous membranes, he was treated with 3 lit of fluid and gently hydration , his WBC count  , lactate and procalcitonin were elevated, urinalaysis was contaminated , blood cultures were negative. His CT scan of chest did not shows pneumonia but was positive for lower lobe interstitial fibrosis, his Echocardiogram showed EF of 65% with Grade I diastolic disfunction. His CT scan of head was negative for acute hemorrhage or ischemia but positive for microvascular changes and a 2.7 cm meningioma.  Pt was admitted due to acute encephalopathy 2/2 to infection or dehydration , he was treated with IV antibiotic Rocephin and Azithromycin for a total of 2 days and with IV fluids . Treatment was stopped when there was no evidence of infection (afebrile, minimal leucocytosis, negative cultures, negative imaging. During his hospital course he was afebrile, blood pressure and heart rate were within normal limits. Neurosurgery was called regarding to pt meningioma he did not recommend any procedure as tx.  Pt was evaluated for Physical therapy and  they recommended a rolling walker. Pt is medically optimized for discharge. Pt is an 88 yr old M with PMH of HTN, BPH, Dementia, CHF and blindness of right eye, pt is a poor historian .  The day prior to this event pt was having his normal behavior. Due to his altered mental status a CT of head was ordered and did not shows any acute conditions such as ischemic or hemorraghic stroke, but showed microvascular changes. On admission his dehydrated with low blood pressure and dry mucous membranes, he was treated with 3 lit of fluid and gently hydration , his WBC count  , lactate and procalcitonin were elevated all of which improved with hydration and a short course of zithro which was taken off when cultures were negative. Urinalaysis was contaminated , blood cultures were negative. His CT scan of chest did not shows pneumonia but was positive for lower lobe interstitial fibrosis, his Echocardiogram showed EF of 65% with Grade I diastolic disfunction. His CT scan of head was negative for acute hemorrhage or ischemia but positive for microvascular changes and a 2.7 cm meningioma.  Pt was admitted due to acute encephalopathy 2/2 to infection or dehydration , he was treated with IV antibiotic Rocephin and Azithromycin for a total of 2 days and with IV fluids . Treatment was stopped when there was no evidence of infection (afebrile, minimal leucocytosis, negative cultures, negative imaging. During his hospital course he was afebrile, blood pressure and heart rate were within normal limits. Neurosurgery was called regarding to pt meningioma he did not recommend any procedure as tx.  Pt was evaluated for Physical therapy and  they recommended a rolling walker. Pt is medically optimized for discharge. He is eating well, talking/feeding himself and is doing well. Diet to be soft and encourage oral hydration     Total time coordinating this discharge was 40 minutes.

## 2017-08-25 NOTE — DISCHARGE NOTE ADULT - SECONDARY DIAGNOSIS.
JESSE (acute kidney injury) CHF (congestive heart failure) Dehydration Dementia Hypertension BPH (benign prostatic hyperplasia)

## 2017-08-25 NOTE — DISCHARGE NOTE ADULT - PATIENT PORTAL LINK FT
“You can access the [employer] Patient Portal, offered by [facility], by registering with the following website: [email or web address]”

## 2017-08-25 NOTE — PROGRESS NOTE ADULT - SUBJECTIVE AND OBJECTIVE BOX
CC: altered mental status     Pt is a 88 yr old with PMH of vascular dementia, right eye blindness' ,  CHF, HTN, BPH  who was brought in by the EMS from his nursery home. No acute events over night, pt has been afebrile , bp and hr has been normal . Pt denies chest pain , no sob, no fever or chills .  In the nursery home pt was able to wake up every morning, eat breakfast and walk    ROS: no fever, abdominal pain, diarrhea, chills, cough, n/v, constipation. no headache      Vital Signs Last 24 Hrs  T(C): 36.8 (24 Aug 2017 23:34), Max: 36.9 (24 Aug 2017 12:15)  T(F): 98.2 (24 Aug 2017 23:34), Max: 98.5 (24 Aug 2017 12:15)  HR: 110 (24 Aug 2017 23:34) (53 - 110)  BP: 136/78 (24 Aug 2017 23:34) (136/78 - 157/87)  BP(mean): --  RR: 18 (24 Aug 2017 23:34) (18 - 18)  SpO2: 96% (24 Aug 2017 23:34) (95% - 96%)    GENERAL: NAD, well-groomed, well-developed  HEAD:  Atraumatic, Normocephalic  EYES: EOMI, blindness of right eye   ENMT: No tonsillar erythema, exudates, or enlargement; Moist mucous membranes,, No lesions  LUNG: Clear to auscultation bilaterally; No rales, rhonchi, wheezing, or rubs  HEART: Regular rate and rhythm; No murmurs, rubs, or gallops, normal s1/s2  ABDOMEN: Soft, Nontender, Nondistended; Bowel sounds present  EXTREMITIES:  2+ Peripheral Pulses, No clubbing, cyanosis, or edema, capillary refill < 2 seconds   NERVOUS SYSTEM:  Alert & Oriented X1, just to person ,  Motor Strength 5/5 B/L upper and lower extremities;  SKIN: No rashes or lesions , no abscess , no ulcers     Lab:                        12.5   10.1  )-----------( 234      ( 24 Aug 2017 05:48 )             36.3   08-23    140  |  103  |  42.0<H>  ----------------------------<  117<H>  4.7   |  21.0<L>  |  2.20<H>    Ca    9.6      23 Aug 2017 11:41    TPro  7.5  /  Alb  4.5  /  TBili  0.5  /  DBili  x   /  AST  33  /  ALT  10  /  AlkPhos  75  08-23    Urinalysis (08.23.17 @ 11:55)    Glucose Qualitative, Urine: Negative mg/dL    Blood, Urine: Moderate    pH Urine: 5.0    Color: Yellow    Urine Appearance: Clear    Bilirubin: Negative    Ketone - Urine: Negative    Specific Gravity: 1.015    Protein, Urine: Negative mg/dL    Urobilinogen: Negative mg/dL    Nitrite: Negative    Leukocyte Esterase Concentration: Negative           CT Chest No Cont (08.23.17 @ 18:48) >  IMPRESSION:    No acute CT pathology. Bilateral lower lobe interstitial fibrosis..        CT Head No Cont (08.23.17 @ 12:56) >  IMPRESSION:  Mildchronic microvascular changes without evidence of an   acute transcortical infarction or hemorrhage. MR is a more sensitive   imaging modality for the evaluation of an acute infarction.

## 2017-08-25 NOTE — DIETITIAN INITIAL EVALUATION ADULT. - OTHER INFO
Pt has not yet attempted breakfast this am but nsg reports poor po intake throughout admission so far most likely secondary to change in mental status.

## 2017-08-25 NOTE — DIETITIAN INITIAL EVALUATION ADULT. - PROBLEM SELECTOR PLAN 1
-likely secondary to infectious etiology vs dehydration   -admit to any any medical  bed , medicine service under Dr Orr   -activity ambulate with assistance , falls precautions  -vitals per routine   -diet DASH   -strict in and out  -CBC , CMP, ua, blood culture x 3, lactate , procalcitonin , urine culture   - AM lab cbc, cmp  -pt had 2 lit of fluid bolus and now will continue gentle hydration   -pt was tx in the ED with Rocephin and azithromycin   -head CT was negative for acute pathology , CXR was clear , CT of chest was ordered  to rule out PNA , if positive for PNA will start tx w/ zosyn if negative will continue Rocephin and azithromycin   -PT consult, nutritional consult and  consul were placed

## 2017-08-25 NOTE — PROGRESS NOTE ADULT - ASSESSMENT
Pt is a 88 yr old with PMH of vascular dementia, right eye blindness' ,  CHF, HTN, BPH  who was brought in by the EMS from his nursery home with a CC of change in mental status, as per staff member of his nursery home pt usually wake up in the morning and has breakfast, but today he had some unusual behavior , he was lethargy and confused .      Pt wbc count went down from 12.8 to 10.1 , his kidney function is much better in relation to Cr and BUN on admission,pt was able to wake up every morning , walk and has breakfast in his nursery home,  pt has been afebrile , denies sob no cough Pt is a 88 yr old with PMH of vascular dementia, right eye blindness' ,  CHF, HTN, BPH  who was brought in by the EMS from his nursery home with a CC of change in mental status, as per staff member of his nursery home pt usually wake up in the morning and has breakfast, but on day of admission  he had some unusual behavior , he was lethargy and confused .      Pt wbc count went down from 12.8 to 10.1 , his kidney function is much better in relation to Cr and BUN on admission,pt was able to wake up every morning , walk and has breakfast in his nursery home,  pt has been afebrile , denies sob no cough Pt is a 88 yr old with PMH of vascular dementia, right eye blindness' ,  CHF, HTN, BPH  who was brought in by the EMS from his nursery home with a CC of change in mental status, as per staff member of his nursery home pt usually wake up in the morning and has breakfast, but on day of admission  he had some unusual behavior , he was lethargy and confused .      Pt wbc count went down from 12.8 to 10.1 , his kidney function is much better in relation to Cr and BUN on admission,pt was able to wake up every morning , walk and has breakfast in his nursery home,  pt has been afebrile , denies sob no cough    CT of head shows a 2.7 cm meningioma, neurosurgery called today

## 2017-08-25 NOTE — PROGRESS NOTE ADULT - PROBLEM SELECTOR PLAN 6
-likely essential HTN  -bp has been stable   -pt on metoprolol Xl 25 mg daily and lisinopril 5 mg daily  -furosemide 20 mg every 48 hrs

## 2017-08-26 DIAGNOSIS — D64.9 ANEMIA, UNSPECIFIED: ICD-10-CM

## 2017-08-26 PROCEDURE — 99231 SBSQ HOSP IP/OBS SF/LOW 25: CPT

## 2017-08-26 PROCEDURE — 99233 SBSQ HOSP IP/OBS HIGH 50: CPT | Mod: GC

## 2017-08-26 RX ADMIN — Medication 250 MILLIGRAM(S): at 06:30

## 2017-08-26 RX ADMIN — TAMSULOSIN HYDROCHLORIDE 0.4 MILLIGRAM(S): 0.4 CAPSULE ORAL at 22:19

## 2017-08-26 RX ADMIN — Medication 250 MILLIGRAM(S): at 18:37

## 2017-08-26 RX ADMIN — HEPARIN SODIUM 5000 UNIT(S): 5000 INJECTION INTRAVENOUS; SUBCUTANEOUS at 18:37

## 2017-08-26 RX ADMIN — LISINOPRIL 5 MILLIGRAM(S): 2.5 TABLET ORAL at 06:30

## 2017-08-26 RX ADMIN — Medication 100 MILLIGRAM(S): at 15:39

## 2017-08-26 RX ADMIN — DONEPEZIL HYDROCHLORIDE 5 MILLIGRAM(S): 10 TABLET, FILM COATED ORAL at 22:19

## 2017-08-26 RX ADMIN — HEPARIN SODIUM 5000 UNIT(S): 5000 INJECTION INTRAVENOUS; SUBCUTANEOUS at 06:30

## 2017-08-26 RX ADMIN — Medication 25 MILLIGRAM(S): at 06:30

## 2017-08-26 RX ADMIN — Medication 20 MILLIGRAM(S): at 22:21

## 2017-08-26 RX ADMIN — Medication 1 DROP(S): at 18:37

## 2017-08-26 RX ADMIN — RISPERIDONE 0.25 MILLIGRAM(S): 4 TABLET ORAL at 15:39

## 2017-08-26 RX ADMIN — Medication 1 DROP(S): at 06:31

## 2017-08-26 NOTE — PROGRESS NOTE ADULT - SUBJECTIVE AND OBJECTIVE BOX
Pt seen, to be completed. CC: altered mental status     Pt is a 88 yr old with PMH of vascular dementia, right eye blindness' ,  CHF, HTN, BPH  who was brought in by the EMS from his nursery home. No acute events over night, pt has been afebrile , bp and hr has been normal . Pt keeps pulling out his IV, and currently has no IV. Pt denies chest pain , no sob, no fever or chills .  In the nursery home pt was able to wake up every morning, eat breakfast and walk    ROS: no fever, abdominal pain, diarrhea, chills, cough, n/v, constipation. no headache    Vital Signs Last 24 Hrs  T(C): 36.8 (26 Aug 2017 08:36), Max: 36.8 (26 Aug 2017 08:36)  T(F): 98.3 (26 Aug 2017 08:36), Max: 98.3 (26 Aug 2017 08:36)  HR: 94 (26 Aug 2017 08:36) (88 - 96)  BP: 115/69 (26 Aug 2017 08:36) (115/69 - 142/80)  BP(mean): --  RR: 20 (26 Aug 2017 08:36) (18 - 22)  SpO2: 95% (26 Aug 2017 08:36) (95% - 97%)    GENERAL: NAD, well-groomed, well-developed  HEAD:  Atraumatic, Normocephalic  EYES: EOMI, blindness of right eye     LUNG: Clear to auscultation bilaterally; No rales, rhonchi, wheezing, or rubs  HEART: Regular rate and rhythm; No murmurs, rubs, or gallops, normal s1/s2  ABDOMEN: Soft, Nontender, Nondistended; Bowel sounds present  EXTREMITIES:  2+ Peripheral Pulses, No clubbing, cyanosis, or edema, capillary refill < 2 seconds   NERVOUS SYSTEM:  Alert & Oriented X1, just to person ,    SKIN: No rashes or lesions , no abscess , no ulcers     Lab:                        12.5   10.1  )-----------( 234      ( 24 Aug 2017 05:48 )             36.3   08-23    140  |  103  |  42.0<H>  ----------------------------<  117<H>  4.7   |  21.0<L>  |  2.20<H>    Ca    9.6      23 Aug 2017 11:41    TPro  7.5  /  Alb  4.5  /  TBili  0.5  /  DBili  x   /  AST  33  /  ALT  10  /  AlkPhos  75  08-23    Urinalysis (08.23.17 @ 11:55)    Glucose Qualitative, Urine: Negative mg/dL    Blood, Urine: Moderate    pH Urine: 5.0    Color: Yellow    Urine Appearance: Clear    Bilirubin: Negative    Ketone - Urine: Negative    Specific Gravity: 1.015    Protein, Urine: Negative mg/dL    Urobilinogen: Negative mg/dL    Nitrite: Negative    Leukocyte Esterase Concentration: Negative    MEDICATIONS  (STANDING):  heparin  Injectable 5000 Unit(s) SubCutaneous every 12 hours  saccharomyces boulardii 250 milliGRAM(s) Oral two times a day  tamsulosin 0.4 milliGRAM(s) Oral at bedtime  donepezil 5 milliGRAM(s) Oral at bedtime  risperiDONE   Tablet 0.25 milliGRAM(s) Oral daily  metoprolol succinate ER 25 milliGRAM(s) Oral daily  lisinopril 5 milliGRAM(s) Oral daily  docusate sodium 100 milliGRAM(s) Oral daily  furosemide    Tablet 20 milliGRAM(s) Oral every 48 hours  artificial  tears Solution 1 Drop(s) Both EYES two times a day       CT Chest No Cont (08.23.17 @ 18:48) >  IMPRESSION:    No acute CT pathology. Bilateral lower lobe interstitial fibrosis..        CT Head No Cont (08.23.17 @ 12:56) >  IMPRESSION:  Mildchronic microvascular changes without evidence of an   acute transcortical infarction or hemorrhage. MR is a more sensitive   imaging modality for the evaluation of an acute infarction. CC: altered mental status     Patient seen and examined at bedside. No acute distress and no acute overnight events . Pt has been afebrile , bp and hr has been normal . Pt keeps pulling out his IV, and currently has no IV. Pt denies chest pain , no sob, no fever or chills .  In the nuring home pt was able to wake up every morning, eat breakfast and walk    ROS: no fever, abdominal pain, diarrhea, chills, cough, n/v, constipation. no headache    Vital Signs Last 24 Hrs  T(C): 36.8 (26 Aug 2017 08:36), Max: 36.8 (26 Aug 2017 08:36)  T(F): 98.3 (26 Aug 2017 08:36), Max: 98.3 (26 Aug 2017 08:36)  HR: 94 (26 Aug 2017 08:36) (88 - 96)  BP: 115/69 (26 Aug 2017 08:36) (115/69 - 142/80)  BP(mean): --  RR: 20 (26 Aug 2017 08:36) (18 - 22)  SpO2: 95% (26 Aug 2017 08:36) (95% - 97%)    GENERAL: NAD, well-groomed, well-developed  HEAD:  Atraumatic, Normocephalic  EYES: EOMI, blindness of right eye   LUNG: Clear to auscultation bilaterally; No rales, rhonchi, wheezing, or rubs  HEART: Regular rate and rhythm; No murmurs, rubs, or gallops, normal s1/s2  ABDOMEN: Soft, Nontender, Nondistended; Bowel sounds present  EXTREMITIES:  2+ Peripheral Pulses, No clubbing, cyanosis, or edema, capillary refill < 2 seconds   NERVOUS SYSTEM:  Alert & Oriented X1, just to person ,    SKIN: No rashes or lesions , no abscess , no ulcers     Lab: No new labs                                    12.0   8.1   )-----------( 235      ( 25 Aug 2017 08:36 )             35.3   08-25    141  |  105  |  25.0<H>  ----------------------------<  105  4.5   |  21.0<L>  |  1.26    Ca    8.9      25 Aug 2017 08:35    TPro  6.4<L>  /  Alb  3.6  /  TBili  0.5  /  DBili  x   /  AST  112<H>  /  ALT  28  /  AlkPhos  66  08-25      Urinalysis (08.23.17 @ 11:55)    Glucose Qualitative, Urine: Negative mg/dL    Blood, Urine: Moderate    pH Urine: 5.0    Color: Yellow    Urine Appearance: Clear    Bilirubin: Negative    Ketone - Urine: Negative    Specific Gravity: 1.015    Protein, Urine: Negative mg/dL    Urobilinogen: Negative mg/dL    Nitrite: Negative    Leukocyte Esterase Concentration: Negative    MEDICATIONS  (STANDING):  heparin  Injectable 5000 Unit(s) SubCutaneous every 12 hours  saccharomyces boulardii 250 milliGRAM(s) Oral two times a day  tamsulosin 0.4 milliGRAM(s) Oral at bedtime  donepezil 5 milliGRAM(s) Oral at bedtime  risperiDONE   Tablet 0.25 milliGRAM(s) Oral daily  metoprolol succinate ER 25 milliGRAM(s) Oral daily  lisinopril 5 milliGRAM(s) Oral daily  docusate sodium 100 milliGRAM(s) Oral daily  furosemide    Tablet 20 milliGRAM(s) Oral every 48 hours  artificial  tears Solution 1 Drop(s) Both EYES two times a day       CT Chest No Cont (08.23.17 @ 18:48) >  IMPRESSION:    No acute CT pathology. Bilateral lower lobe interstitial fibrosis..        CT Head No Cont (08.23.17 @ 12:56) >  IMPRESSION:  Mildchronic microvascular changes without evidence of an   acute transcortical infarction or hemorrhage. MR is a more sensitive   imaging modality for the evaluation of an acute infarction. CC: altered mental status     Patient seen and examined at bedside. No acute distress and no acute overnight events . Pt has been afebrile , bp and hr has been normal . Pt keeps pulling out his IV, and currently has no IV. Pt denies chest pain , no sob, no fever or chills .  In the nuring home pt was able to wake up every morning, eat breakfast and walk    ROS: no fever, abdominal pain, diarrhea, chills, cough, n/v, constipation. no headache    Vital Signs Last 24 Hrs  T(C): 36.8 (26 Aug 2017 08:36), Max: 36.8 (26 Aug 2017 08:36)  T(F): 98.3 (26 Aug 2017 08:36), Max: 98.3 (26 Aug 2017 08:36)  HR: 94 (26 Aug 2017 08:36) (88 - 96)  BP: 115/69 (26 Aug 2017 08:36) (115/69 - 142/80)  BP(mean): --  RR: 20 (26 Aug 2017 08:36) (18 - 22)  SpO2: 95% (26 Aug 2017 08:36) (95% - 97%)    GENERAL: NAD, well-groomed, well-developed  HEAD:  Atraumatic, Normocephalic  EYES: EOMI, blindness of right eye   LUNG: Clear to auscultation bilaterally; No rales, rhonchi, wheezing, or rubs  HEART: Regular rate and rhythm; No murmurs, rubs, or gallops, normal s1/s2  ABDOMEN: Soft, Nontender, Nondistended; Bowel sounds present  EXTREMITIES:  2+ Peripheral Pulses, No clubbing, cyanosis, or edema, capillary refill < 2 seconds   NERVOUS SYSTEM:  Alert & Oriented X1, just to person ,    SKIN: No rashes or lesions , no abscess , no ulcers     Lab: No new labs                                    12.0   8.1   )-----------( 235      ( 25 Aug 2017 08:36 )             35.3   08-25    141  |  105  |  25.0<H>  ----------------------------<  105  4.5   |  21.0<L>  |  1.26    Ca    8.9      25 Aug 2017 08:35    TPro  6.4<L>  /  Alb  3.6  /  TBili  0.5  /  DBili  x   /  AST  112<H>  /  ALT  28  /  AlkPhos  66  08-25      Urinalysis (08.23.17 @ 11:55)    Glucose Qualitative, Urine: Negative mg/dL    Blood, Urine: Moderate    pH Urine: 5.0    Color: Yellow    Urine Appearance: Clear    Bilirubin: Negative    Ketone - Urine: Negative    Specific Gravity: 1.015    Protein, Urine: Negative mg/dL    Urobilinogen: Negative mg/dL    Nitrite: Negative    Leukocyte Esterase Concentration: Negative    MEDICATIONS  (STANDING):  heparin  Injectable 5000 Unit(s) SubCutaneous every 12 hours  saccharomyces boulardii 250 milliGRAM(s) Oral two times a day  tamsulosin 0.4 milliGRAM(s) Oral at bedtime  donepezil 5 milliGRAM(s) Oral at bedtime  risperiDONE   Tablet 0.25 milliGRAM(s) Oral daily  metoprolol succinate ER 25 milliGRAM(s) Oral daily  lisinopril 5 milliGRAM(s) Oral daily  docusate sodium 100 milliGRAM(s) Oral daily  furosemide    Tablet 20 milliGRAM(s) Oral every 48 hours  artificial  tears Solution 1 Drop(s) Both EYES two times a day       CT Chest No Cont (08.23.17 @ 18:48) >  IMPRESSION:    No acute CT pathology. Bilateral lower lobe interstitial fibrosis..        CT Head No Cont (08.23.17 @ 12:56) >  IMPRESSION:  Mildchronic microvascular changes without evidence of an   acute transcortical infarction or hemorrhage. MR is a more sensitive   imaging modality for the evaluation of an acute infarction.       HOSPITAL COURSE:  Pt is an 88 yr old M with PMH of HTN, BPH, Dementia, CHF and blindness of right eye, pt is a poor historian . Pt was brought in by the EMS. As per EMS and staff from the nursery  home pt normally gets out of bed daily and comes to breakfast but the day of his admission  he did not get out of bed, he was more lethargic, more confused than usual, on his baseline pt is able to ambulate and eat by himself. The day prior to this event pt was having his normal behavior. Due to his altered mental status a CT of head was ordered and did not shows any acute conditions such as ischemic or hemorraghic stroke, but showed microvascular changes. On admission his dihydrated with low blood pressure and dry mucous membranes, he was treated with 3 lit of fluid and gently hydration , his WBC count  , lactate and procalcitonin were elevated, urinalaysis was contaminated , blood cultures were negative. His CT scan of chest did not shows pneumonia but was positive for lower lobe interstitial fibrosis, his Echocardiogram showed EF of 65% with Grade I diastolic disfunction. Pt was treated with IV antibiotic Rocephin and Azithromycin for a total of 2 days. Treatment was stopped when there was no evidence of infection (afebrile, minimal leucocytosis, negative cultures, negative imaging. A possible meningioma was found on head CT, but neurosurgery said given age and no change since 2015 that surgery was not appropriate. During his hospital course he was afebrile, blood pressure and heart rate were within normal limits. Pt was evaluated for Physical therapy and  they recommended a rolling walker.    Neurosurgery: [facility]    Disposition: Pt can be discharged when placement is found

## 2017-08-26 NOTE — PROGRESS NOTE ADULT - PROBLEM SELECTOR PLAN 9
-ambulate with assistance  -heparin 5000 mg bid

## 2017-08-26 NOTE — CONSULT NOTE ADULT - SUBJECTIVE AND OBJECTIVE BOX
HISTORY OF PRESENT ILLNESS:   Please note that history was obtained from chart as patient was a poor historian and often responded "I don't know." Denies headache, pain elsewhere, paresthesias, visual disturbances in L eye (baseline blind in R), weakness, chest pain, palpitations, SOB, abdominal pain, n/v/d.     HPI from chart:  88 yr old M with PMH of HTN, BPH, Dementia, CHF and blindness of right eye , pt is a poor historian . Pt was brought in by the EMS. As per EMS and staff from the nursery  home pt normally gets out of bed daily  and comes to breakfast but today he did not get out of bed, he seemed more lethargic, more confused than usual, on his baseline pt is able to ambulate and eat by himself. The day prior to this event pt was having his normal behavior, the  staff from the nursery home do not exactly when he was last time he was know well.  On  vitals signs taken by the EMS pt was hypotensive with bp= 100/50 mmhg ,he denies chest pain , palpitations , headache , no diarrhea or abdominal pain , dizziness , no calf tenderness, no recent travel.     PAST MEDICAL & SURGICAL HISTORY:  Blindness of one eye  CHF (congestive heart failure)  Dementia  Hypertension  Poor historian  Poor historian    FAMILY HISTORY:  No pertinent family history in first degree relatives    SOCIAL HISTORY:  Per chart, lives in [facility]. No h/o smoking/ETOH consumption    Allergies: No Known Allergies    REVIEW OF SYSTEMS as noted in HPI    MEDICATIONS:  Neuro:  donepezil 5 milliGRAM(s) Oral at bedtime  risperiDONE   Tablet 0.25 milliGRAM(s) Oral daily    Anticoagulation:  heparin  Injectable 5000 Unit(s) SubCutaneous every 12 hours    OTHER:  saccharomyces boulardii 250 milliGRAM(s) Oral two times a day  tamsulosin 0.4 milliGRAM(s) Oral at bedtime  metoprolol succinate ER 25 milliGRAM(s) Oral daily  lisinopril 5 milliGRAM(s) Oral daily  docusate sodium 100 milliGRAM(s) Oral daily  furosemide    Tablet 20 milliGRAM(s) Oral every 48 hours  artificial  tears Solution 1 Drop(s) Both EYES two times a day    Vital Signs Last 24 Hrs  T(C): 36.8 (26 Aug 2017 08:36), Max: 36.8 (26 Aug 2017 08:36)  T(F): 98.3 (26 Aug 2017 08:36), Max: 98.3 (26 Aug 2017 08:36)  HR: 94 (26 Aug 2017 08:36) (88 - 96)  BP: 115/69 (26 Aug 2017 08:36) (115/69 - 142/80)  BP(mean): --  RR: 20 (26 Aug 2017 08:36) (18 - 22)  SpO2: 95% (26 Aug 2017 08:36) (95% - 97%)    PHYSICAL EXAM:  GENERAL: NAD  HEAD:  Atraumatic, normocephalic  EYES: Conjunctiva and sclera clear  ENMT: Moist mucous membranes  NECK: Supple  MENTAL STATUS: AAO x1 (to self only); Awake; Opens eyes spontaneously; Answers few simple questions, no aphasia appreciated; following simple commands  CRANIAL NERVES: R eye blind, L pupil ~4 mm reactive; EOMI; visual fields grossly intact; no facial asymmetry; facial sensation grossly intact to light touch b/l;  tongue midline  MOTOR: strength 4/5 b/l upper extremities; 3/5 b/l lower extremities; no pronator drift  SENSATION: grossly intact to light touch all extremities  CHEST/LUNG: Clear to auscultation bilaterally  HEART: +S1/+S2; Regular rate and rhythm  ABDOMEN: Soft, nontender, nondistended  EXTREMITIES:  2+ DP pulses, no clubbing, cyanosis, or edema  SKIN: Warm, dry; no rashes or lesions    LABS:                        12.0   8.1   )-----------( 235      ( 25 Aug 2017 08:36 )             35.3     08-    141  |  105  |  25.0<H>  ----------------------------<  105  4.5   |  21.0<L>  |  1.26    Ca    8.9      25 Aug 2017 08:35    TPro  6.4<L>  /  Alb  3.6  /  TBili  0.5  /  DBili  x   /  AST  112<H>  /  ALT  28  /  AlkPhos  66  08-      Urinalysis Basic - ( 25 Aug 2017 14:55 )    Color: Yellow / Appearance: Clear / S.015 / pH: x  Gluc: x / Ketone: Small  / Bili: Negative / Urobili: Negative mg/dL   Blood: x / Protein: 15 mg/dL / Nitrite: Negative   Leuk Esterase: Negative / RBC: 0-2 /HPF / WBC 3-5   Sq Epi: x / Non Sq Epi: x / Bacteria: x      RADIOLOGY & ADDITIONAL STUDIES:  < from: CT Head No Cont (17 @ 12:56) >  FINDINGS: Stable high left posterior frontal lobe extra-axial hyperdense   mass on image 54, series 2, measures 2.7 cm, unchanged, compatible with a   meningioma.    There is periventricular and subcortical white matter hypodensity without   mass effect, nonspecific, likely representing mild chronic microvascular   ischemic changes. There is no compelling evidence for an acute   transcortical infarction. There is no other evidence of mass, mass   effect, midline shift or extra-axial fluid collection. The lateral   ventricles and cortical sulci are age-appropriate in size and   configuration.     Moderate left mastoid air cell effusion. 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 Mild inflammatory mucosal changes are seen   throughout the various portions of the paranasal sinuses. The orbits and   right mastoid air cells are otherwise unremarkable. The calvarium is   intact. Developmental nonfused posterior C1 arch.    IMPRESSION:  Mildchronic microvascular changes without evidence of an   acute transcortical infarction or hemorrhage. MR is a more sensitive   imaging modality for the evaluation of an acute infarction.     < end of copied text >

## 2017-08-26 NOTE — PROGRESS NOTE ADULT - PROBLEM SELECTOR PLAN 5
-Hb = 12.3 with elevated MCV   Fairly stable, slight decrease -likely essential HTN  -bp has been stable   -pt on metoprolol Xl 25 mg daily and lisinopril 5 mg daily  -furosemide 20 mg every 48 hrs

## 2017-08-26 NOTE — PROGRESS NOTE ADULT - PROBLEM SELECTOR PLAN 2
- Pt pulled out IV, replace today  - pt had 2 lits of fluid in the ED   -strict in and out   -Cr and BUN trending down - Pt pulled out IV, replace today  - pt had 2 lits of fluid in the ED   -encourage oral hydration and assistance as need  -will add ensure   -Cr and BUN trending down

## 2017-08-26 NOTE — CHART NOTE - NSCHARTNOTEFT_GEN_A_CORE
PGY2 Note    Spoke to Ambika Sebastian from [facility] where he has been living.    She knows him well, he calls her Rosalee Rodriguez. He has no family, so no one to call.    Baseline:   Oriented to Person and often to time of day, but not date or place.   He is able to eat and drink independently. He is able to dress independently.  He is able to ambulate with a walker and navigate stairs independently using the banister.  He can take shower and wash himself independently, but needs cues as to what to wash next.  Occasionally he speaks nonsense, but there is no particular time of day.  Ms Sebastian has not seen him sundown, but he wakes up early, 4:00am and goes to sleep right after dinner.    He has a high PSA, but has refused to see a urologist.  He has a cardiac hx w/pulse 55-65. He had been tachycardic but was treated with his home med, metoprolol.     Dr. Tobar

## 2017-08-26 NOTE — PROGRESS NOTE ADULT - PROBLEM SELECTOR PLAN 4
-likely 2/2 to dehydration  -BUN trending down 25 was 42 on admission  - pt had 2 lit of fluid in the ED  - Pt removed IV no IV hydration currently -likely 2/2 to dehydration  -BUN trending down 25 was 42 on admission  - pt had 2 lit of fluid in the ED  - Pt removed IV no IV hydration currently; will encourage oral hydration

## 2017-08-26 NOTE — CONSULT NOTE ADULT - ASSESSMENT
A/P: 87 y/o M PMH of HTN, BPH, Dementia, CHF and blindness of right eye, admitted with AMS. CT Head shows high left posterior frontal lobe extra-axial hyperdense mass compatible with a meningioma. Films reviewed and case d/w Dr. Disla. Mass is stable from 2015 CT head.  - D/w Dr. Disla.   - No neurosurgical intervention warranted at this time  - Continue further medical management per IM/FM

## 2017-08-26 NOTE — PROGRESS NOTE ADULT - PROBLEM SELECTOR PLAN 1
-likely dehydration, afebrile, WBC trending down, BCx (-) at 48h, UCx contaminated, pt with pmh of dementia  -follow up of mental status   -wbc count is trending down -likely dehydration, afebrile, WBC trending down, BCx (-) at 48h, UCx contaminated, pt with pmh of dementia  -follow up of mental status   -wbc count is trending down  -will repeat procalcitonin   -no e/o acute infection  -meningioma noted - d/w neurosurgery-  no further intervention

## 2017-08-26 NOTE — PROGRESS NOTE ADULT - PROBLEM SELECTOR PLAN 3
-resolved  -BCx (-) at 48hrs and Urine culture contaminated likely aocd, b12/folate and tsh noted  stable thus far - will monitor

## 2017-08-26 NOTE — PROGRESS NOTE ADULT - ASSESSMENT
Pt is a 88 yr old with PMH of vascular dementia, right eye blindness' ,  CHF, HTN, BPH  who was brought in by the EMS from his nursery home with a CC of change in mental status, as per staff member of his nursery home pt usually wake up in the morning and has breakfast, but on day of admission  he had some unusual behavior , he was lethargy and confused .      Pt wbc count went down from 12.8 to 10.1 , his kidney function is much better in relation to Cr and BUN on admission,pt was able to wake up every morning , walk and has breakfast in his nursery home,  pt has been afebrile , denies sob no cough    CT of head shows a 2.7 cm meningioma, neurosurgery called today

## 2017-08-26 NOTE — PROGRESS NOTE ADULT - PROBLEM SELECTOR PLAN 6
-likely essential HTN  -bp has been stable   -pt on metoprolol Xl 25 mg daily and lisinopril 5 mg daily  -furosemide 20 mg every 48 hrs -pt is not in acute CHF at this time   -pt on metoprolol Xl 25 mg daily and lisinopril 5 mg daily  -furosemide 20 mg every 48 hrs

## 2017-08-26 NOTE — PROGRESS NOTE ADULT - PROBLEM SELECTOR PLAN 7
-pt is not in acute CHF at this time   -daily weight  -strict in and out  -pt on metoprolol Xl 25 mg daily and lisinopril 5 mg daily  -furosemide 20 mg every 48 hrs -likely secondary to vascular dementia  -continue tx with donepezil 5 mg daily

## 2017-08-26 NOTE — PROGRESS NOTE ADULT - PROBLEM SELECTOR PLAN 8
-likely secondary to vascular dementia  -continue tx with donepezil 5 mg daily -ambulate with assistance  -heparin 5000 mg bid

## 2017-08-27 VITALS
DIASTOLIC BLOOD PRESSURE: 73 MMHG | TEMPERATURE: 99 F | SYSTOLIC BLOOD PRESSURE: 133 MMHG | RESPIRATION RATE: 18 BRPM | HEART RATE: 78 BPM | OXYGEN SATURATION: 96 %

## 2017-08-27 LAB
ALBUMIN SERPL ELPH-MCNC: 3.3 G/DL — SIGNIFICANT CHANGE UP (ref 3.3–5.2)
ALP SERPL-CCNC: 62 U/L — SIGNIFICANT CHANGE UP (ref 40–120)
ALT FLD-CCNC: 26 U/L — SIGNIFICANT CHANGE UP
ANION GAP SERPL CALC-SCNC: 13 MMOL/L — SIGNIFICANT CHANGE UP (ref 5–17)
AST SERPL-CCNC: 54 U/L — HIGH
BILIRUB SERPL-MCNC: 0.5 MG/DL — SIGNIFICANT CHANGE UP (ref 0.4–2)
BUN SERPL-MCNC: 23 MG/DL — HIGH (ref 8–20)
CALCIUM SERPL-MCNC: 9.3 MG/DL — SIGNIFICANT CHANGE UP (ref 8.6–10.2)
CHLORIDE SERPL-SCNC: 108 MMOL/L — HIGH (ref 98–107)
CO2 SERPL-SCNC: 23 MMOL/L — SIGNIFICANT CHANGE UP (ref 22–29)
CREAT SERPL-MCNC: 1.21 MG/DL — SIGNIFICANT CHANGE UP (ref 0.5–1.3)
GLUCOSE SERPL-MCNC: 93 MG/DL — SIGNIFICANT CHANGE UP (ref 70–115)
HCT VFR BLD CALC: 38.5 % — LOW (ref 42–52)
HGB BLD-MCNC: 12.8 G/DL — LOW (ref 14–18)
MCHC RBC-ENTMCNC: 33.2 G/DL — SIGNIFICANT CHANGE UP (ref 32–36)
MCHC RBC-ENTMCNC: 34 PG — HIGH (ref 27–31)
MCV RBC AUTO: 102.1 FL — HIGH (ref 80–94)
PLATELET # BLD AUTO: 257 K/UL — SIGNIFICANT CHANGE UP (ref 150–400)
POTASSIUM SERPL-MCNC: 4.1 MMOL/L — SIGNIFICANT CHANGE UP (ref 3.5–5.3)
POTASSIUM SERPL-SCNC: 4.1 MMOL/L — SIGNIFICANT CHANGE UP (ref 3.5–5.3)
PROCALCITONIN SERPL-MCNC: <0.05 NG/ML — SIGNIFICANT CHANGE UP (ref 0–0.04)
PROT SERPL-MCNC: 6.6 G/DL — SIGNIFICANT CHANGE UP (ref 6.6–8.7)
RBC # BLD: 3.77 M/UL — LOW (ref 4.6–6.2)
RBC # FLD: 12.1 % — SIGNIFICANT CHANGE UP (ref 11–15.6)
SODIUM SERPL-SCNC: 144 MMOL/L — SIGNIFICANT CHANGE UP (ref 135–145)
WBC # BLD: 7.4 K/UL — SIGNIFICANT CHANGE UP (ref 4.8–10.8)
WBC # FLD AUTO: 7.4 K/UL — SIGNIFICANT CHANGE UP (ref 4.8–10.8)

## 2017-08-27 PROCEDURE — 99239 HOSP IP/OBS DSCHRG MGMT >30: CPT

## 2017-08-27 RX ORDER — POTASSIUM CHLORIDE 20 MEQ
0 PACKET (EA) ORAL
Qty: 0 | Refills: 0 | COMMUNITY

## 2017-08-27 RX ORDER — SPIRONOLACTONE 25 MG/1
0 TABLET, FILM COATED ORAL
Qty: 0 | Refills: 0 | COMMUNITY

## 2017-08-27 RX ORDER — POTASSIUM CHLORIDE 20 MEQ
1 PACKET (EA) ORAL
Qty: 15 | Refills: 0
Start: 2017-08-27 | End: 2017-09-26

## 2017-08-27 RX ORDER — FUROSEMIDE 40 MG
1 TABLET ORAL
Qty: 0 | Refills: 0 | COMMUNITY

## 2017-08-27 RX ORDER — METOPROLOL TARTRATE 50 MG
1 TABLET ORAL
Qty: 0 | Refills: 0 | DISCHARGE
Start: 2017-08-27

## 2017-08-27 RX ORDER — FUROSEMIDE 40 MG
1 TABLET ORAL
Qty: 15 | Refills: 0
Start: 2017-08-27 | End: 2017-09-26

## 2017-08-27 RX ADMIN — RISPERIDONE 0.25 MILLIGRAM(S): 4 TABLET ORAL at 15:08

## 2017-08-27 RX ADMIN — HEPARIN SODIUM 5000 UNIT(S): 5000 INJECTION INTRAVENOUS; SUBCUTANEOUS at 06:07

## 2017-08-27 RX ADMIN — Medication 100 MILLIGRAM(S): at 12:33

## 2017-08-27 RX ADMIN — Medication 250 MILLIGRAM(S): at 06:07

## 2017-08-27 RX ADMIN — Medication 1 DROP(S): at 06:07

## 2017-08-27 NOTE — PROGRESS NOTE ADULT - SUBJECTIVE AND OBJECTIVE BOX
CC: altered mental status     Patient seen and examined at bedside. No acute distress and no acute overnight events . Pt has been afebrile , bp and hr has been normal . Pt currntly has no IV. Pt denies chest pain , no sob, no fever or chills .  In the nuring home pt was able to wake up every morning, eat breakfast and walk. Baseline noted in chart note.      CT of head shows a 2.7 cm meningioma, neurosurgery noted there is no change from 2015, and due to age recommends no surgery at this timel.    Discussion w/nurse from home yesterday.    ROS: no fever, abdominal pain, diarrhea, chills, cough, n/v, constipation. no headache    Vital Signs Last 24 Hrs  T(C): 36.7 (27 Aug 2017 06:00), Max: 36.8 (26 Aug 2017 08:36)  T(F): 98 (27 Aug 2017 06:00), Max: 98.3 (26 Aug 2017 08:36)  HR: 80 (27 Aug 2017 06:00) (80 - 94)  BP: 102/60 (27 Aug 2017 06:00) (102/60 - 115/69)  BP(mean): --  RR: 18 (27 Aug 2017 06:00) (18 - 20)  SpO2: 95% (27 Aug 2017 06:00) (94% - 95%)Vital Signs Last 24 Hrs      GENERAL: NAD, well-groomed, well-developed  HEAD:  Atraumatic, Normocephalic  EYES: EOMI, blindness of right eye   LUNG: Clear to auscultation bilaterally; No rales, rhonchi, wheezing, or rubs  HEART: Regular rate and rhythm; No murmurs, rubs, or gallops, normal s1/s2  ABDOMEN: Soft, Nontender, Nondistended; Bowel sounds present  EXTREMITIES:  2+ Peripheral Pulses, No clubbing, cyanosis, or edema, capillary refill < 2 seconds   NERVOUS SYSTEM:  Alert & Oriented X1, just to person   SKIN: No rashes or lesions , no abscess , no ulcers     Lab: No new labs                                    12.0   8.1   )-----------( 235      ( 25 Aug 2017 08:36 )             35.3   08-25    141  |  105  |  25.0<H>  ----------------------------<  105  4.5   |  21.0<L>  |  1.26    Ca    8.9      25 Aug 2017 08:35    TPro  6.4<L>  /  Alb  3.6  /  TBili  0.5  /  DBili  x   /  AST  112<H>  /  ALT  28  /  AlkPhos  66  08-25      Urinalysis (08.23.17 @ 11:55)    Glucose Qualitative, Urine: Negative mg/dL    Blood, Urine: Moderate    pH Urine: 5.0    Color: Yellow    Urine Appearance: Clear    Bilirubin: Negative    Ketone - Urine: Negative    Specific Gravity: 1.015    Protein, Urine: Negative mg/dL    Urobilinogen: Negative mg/dL    Nitrite: Negative    Leukocyte Esterase Concentration: Negative    MEDICATIONS  (STANDING):  heparin  Injectable 5000 Unit(s) SubCutaneous every 12 hours  saccharomyces boulardii 250 milliGRAM(s) Oral two times a day  tamsulosin 0.4 milliGRAM(s) Oral at bedtime  donepezil 5 milliGRAM(s) Oral at bedtime  risperiDONE   Tablet 0.25 milliGRAM(s) Oral daily  metoprolol succinate ER 25 milliGRAM(s) Oral daily  lisinopril 5 milliGRAM(s) Oral daily  docusate sodium 100 milliGRAM(s) Oral daily  furosemide    Tablet 20 milliGRAM(s) Oral every 48 hours  artificial  tears Solution 1 Drop(s) Both EYES two times a day    MEDICATIONS  (PRN):       CT Chest No Cont (08.23.17 @ 18:48) >  IMPRESSION:    No acute CT pathology. Bilateral lower lobe interstitial fibrosis..        CT Head No Cont (08.23.17 @ 12:56) >  IMPRESSION:  Mildchronic microvascular changes without evidence of an   acute transcortical infarction or hemorrhage. MR is a more sensitive   imaging modality for the evaluation of an acute infarction.       HOSPITAL COURSE:  Pt is an 88 yr old M with PMH of HTN, BPH, Dementia, CHF and blindness of right eye, pt is a poor historian . Pt was brought in by the EMS. As per EMS and staff from the nursery  home pt normally gets out of bed daily and comes to breakfast but the day of his admission  he did not get out of bed, he was more lethargic, more confused than usual, on his baseline pt is able to ambulate and eat by himself. The day prior to this event pt was having his normal behavior. Due to his altered mental status a CT of head was ordered and did not shows any acute conditions such as ischemic or hemorraghic stroke, but showed microvascular changes. On admission his dihydrated with low blood pressure and dry mucous membranes, he was treated with 3 lit of fluid and gently hydration , his WBC count  , lactate and procalcitonin were elevated, urinalaysis was contaminated , blood cultures were negative. His CT scan of chest did not shows pneumonia but was positive for lower lobe interstitial fibrosis, his Echocardiogram showed EF of 65% with Grade I diastolic disfunction. Pt was treated with IV antibiotic Rocephin and Azithromycin for a total of 2 days. Treatment was stopped when there was no evidence of infection (afebrile, minimal leucocytosis, negative cultures, negative imaging. A possible meningioma was found on head CT, but neurosurgery said given age and no change since 2015 that surgery was not appropriate. During his hospital course he was afebrile, blood pressure and heart rate were within normal limits. Pt was evaluated for Physical therapy and  they recommended a rolling walker.    Neurosurgery: [facility]    Disposition: Pt can be discharged when placement is found CC: altered mental status     Patient seen and examined at bedside. No acute distress and no acute overnight events . Pt has been afebrile , bp and hr has been normal . Pt currntly has no IV. Pt denies chest pain , no sob, no fever or chills . per d/w patients nurse, he does not eat his regular diet well, needs soft diet but has a good appetite     ROS: no fever, abdominal pain, diarrhea, chills, cough, n/v, constipation. no headache    Vital Signs Last 24 Hrs  T(C): 36.9 (27 Aug 2017 07:51), Max: 36.9 (27 Aug 2017 07:51)  T(F): 98.5 (27 Aug 2017 07:51), Max: 98.5 (27 Aug 2017 07:51)  HR: 74 (27 Aug 2017 07:51) (74 - 81)  BP: 108/64 (27 Aug 2017 07:51) (102/60 - 108/64)  BP(mean): --  RR: 18 (27 Aug 2017 07:51) (18 - 18)  SpO2: 95% (27 Aug 2017 07:51) (94% - 95%)      GENERAL: NAD, well-groomed, well-developed  HEAD:  Atraumatic, Normocephalic. about 4 teeth on bottom row   EYES: EOMI, blindness of right eye   LUNG: Clear to auscultation bilaterally; No rales, rhonchi, wheezing, or rubs  HEART: Regular rate and rhythm; No murmurs, rubs, or gallops, normal s1/s2  ABDOMEN: Soft, Nontender, Nondistended; Bowel sounds present  EXTREMITIES:  2+ Peripheral Pulses, No clubbing, cyanosis, or edema, capillary refill < 2 seconds   NERVOUS SYSTEM:  Alert & Oriented X1, just to person   SKIN: No rashes or lesions , no abscess , no ulcers     Lab:                         12.8   7.4   )-----------( 257      ( 27 Aug 2017 09:11 )             38.5   08-27    144  |  108<H>  |  23.0<H>  ----------------------------<  93  4.1   |  23.0  |  1.21    Ca    9.3      27 Aug 2017 09:11    TPro  6.6  /  Alb  3.3  /  TBili  0.5  /  DBili  x   /  AST  54<H>  /  ALT  26  /  AlkPhos  62  08-27        Urinalysis (08.23.17 @ 11:55)    Glucose Qualitative, Urine: Negative mg/dL    Blood, Urine: Moderate    pH Urine: 5.0    Color: Yellow    Urine Appearance: Clear    Bilirubin: Negative    Ketone - Urine: Negative    Specific Gravity: 1.015    Protein, Urine: Negative mg/dL    Urobilinogen: Negative mg/dL    Nitrite: Negative    Leukocyte Esterase Concentration: Negative    MEDICATIONS  (STANDING):  heparin  Injectable 5000 Unit(s) SubCutaneous every 12 hours  saccharomyces boulardii 250 milliGRAM(s) Oral two times a day  tamsulosin 0.4 milliGRAM(s) Oral at bedtime  donepezil 5 milliGRAM(s) Oral at bedtime  risperiDONE   Tablet 0.25 milliGRAM(s) Oral daily  metoprolol succinate ER 25 milliGRAM(s) Oral daily  lisinopril 5 milliGRAM(s) Oral daily  docusate sodium 100 milliGRAM(s) Oral daily  furosemide    Tablet 20 milliGRAM(s) Oral every 48 hours  artificial  tears Solution 1 Drop(s) Both EYES two times a day    MEDICATIONS  (PRN):       CT Chest No Cont (08.23.17 @ 18:48) >  IMPRESSION:    No acute CT pathology. Bilateral lower lobe interstitial fibrosis..        CT Head No Cont (08.23.17 @ 12:56) >  IMPRESSION:  Mildchronic microvascular changes without evidence of an   acute transcortical infarction or hemorrhage. MR is a more sensitive   imaging modality for the evaluation of an acute infarction.       HOSPITAL COURSE:  Pt is an 88 yr old M with PMH of HTN, BPH, Dementia, CHF and blindness of right eye, pt is a poor historian . Pt was brought in by the EMS. As per EMS and staff from the nursery  home pt normally gets out of bed daily and comes to breakfast but the day of his admission  he did not get out of bed, he was more lethargic, more confused than usual, on his baseline pt is able to ambulate and eat by himself. The day prior to this event pt was having his normal behavior. Due to his altered mental status a CT of head was ordered and did not shows any acute conditions such as ischemic or hemorraghic stroke, but showed microvascular changes. On admission his dihydrated with low blood pressure and dry mucous membranes, he was treated with 3 lit of fluid and gently hydration , his WBC count  , lactate and procalcitonin were elevated, urinalaysis was contaminated , blood cultures were negative. His CT scan of chest did not shows pneumonia but was positive for lower lobe interstitial fibrosis, his Echocardiogram showed EF of 65% with Grade I diastolic disfunction. Pt was treated with IV antibiotic Rocephin and Azithromycin for a total of 2 days. Treatment was stopped when there was no evidence of infection (afebrile, minimal leucocytosis, negative cultures, negative imaging. A possible meningioma was found on head CT, but neurosurgery said given age and no change since 2015 that surgery was not appropriate. During his hospital course he was afebrile, blood pressure and heart rate were within normal limits. Pt was evaluated for Physical therapy and  they recommended a rolling walker.    Neurosurgery: [facility]    Disposition: Pt can be discharged when placement is found

## 2017-08-27 NOTE — PROGRESS NOTE ADULT - PROBLEM SELECTOR PROBLEM 4
JESSE (acute kidney injury)

## 2017-08-27 NOTE — PROGRESS NOTE ADULT - ASSESSMENT
Pt is a 88 yr old with PMH of vascular dementia, right eye blindness' ,  CHF, HTN, BPH  who was brought in by the EMS from his nursery home with a CC of change in mental status, as per staff member of his nursery home pt usually wake up in the morning and has breakfast, but on day of admission  he had some unusual behavior , he was lethargy and confused .      Pt wbc count went down from 12.8 to 10.1 , his kidney function is much better in relation to Cr and BUN on admission,pt was able to wake up every morning , walk and has breakfast in his nursery home,  pt has been afebrile , denies sob no cough    CT of head shows a 2.7 cm meningioma, neurosurgery noted there is no change from 2015, and due to age recommends no surgery at this timel.

## 2017-08-27 NOTE — PROGRESS NOTE ADULT - ATTENDING COMMENTS
Note addended where needed. Plan discussed with resident team, nurse and CM
ECHO   adjust meds
d/c to NH  pt at baseline
Note addended where needed. Plan discussed with resident team at bedside

## 2017-08-27 NOTE — PROGRESS NOTE ADULT - PROBLEM SELECTOR PLAN 6
-pt is not in acute CHF at this time   -pt on metoprolol Xl 25 mg daily and lisinopril 5 mg daily  -furosemide 20 mg every 48 hrs

## 2017-08-27 NOTE — PROGRESS NOTE ADULT - PROBLEM SELECTOR PLAN 4
-likely 2/2 to dehydration  -BUN trending down 25 was 42 on admission  - pt had 2 lit of fluid in the ED  - Pt removed IV no IV hydration currently; will encourage oral hydration

## 2017-08-27 NOTE — PROGRESS NOTE ADULT - PROBLEM SELECTOR PLAN 1
-likely dehydration, afebrile, WBC trending down, BCx (-) at 48h, UCx contaminated, pt with pmh of dementia  -follow up of mental status   -wbc count is trending down  -will repeat procalcitonin   -no e/o acute infection  -meningioma noted - d/w neurosurgery-  no further intervention -likely dehydration, afebrile, WBC trending down, BCx (-) at 48h, UCx contaminated, pt with pmh of dementia  -no e/o acute infection  -meningioma noted - d/w neurosurgery-  no further intervention  -repeat procalc noted

## 2017-08-27 NOTE — PROGRESS NOTE ADULT - PROBLEM SELECTOR PLAN 5
-likely essential HTN  -bp has been stable   -pt on metoprolol Xl 25 mg daily and lisinopril 5 mg daily  -furosemide 20 mg every 48 hrs -likely essential HTN  -bp has been stable   -pt on metoprolol Xl 25 mg daily and lisinopril 5 mg daily  -furosemide 20 mg every 48 hrs; taken off of aldactone

## 2017-08-28 LAB
CULTURE RESULTS: SIGNIFICANT CHANGE UP
SPECIMEN SOURCE: SIGNIFICANT CHANGE UP

## 2017-10-19 PROCEDURE — 82977 ASSAY OF GGT: CPT

## 2017-10-19 PROCEDURE — 81001 URINALYSIS AUTO W/SCOPE: CPT

## 2017-10-19 PROCEDURE — 83605 ASSAY OF LACTIC ACID: CPT

## 2017-10-19 PROCEDURE — 93306 TTE W/DOPPLER COMPLETE: CPT

## 2017-10-19 PROCEDURE — 84100 ASSAY OF PHOSPHORUS: CPT

## 2017-10-19 PROCEDURE — 85379 FIBRIN DEGRADATION QUANT: CPT

## 2017-10-19 PROCEDURE — 83880 ASSAY OF NATRIURETIC PEPTIDE: CPT

## 2017-10-19 PROCEDURE — 71045 X-RAY EXAM CHEST 1 VIEW: CPT

## 2017-10-19 PROCEDURE — 97163 PT EVAL HIGH COMPLEX 45 MIN: CPT

## 2017-10-19 PROCEDURE — 93005 ELECTROCARDIOGRAM TRACING: CPT

## 2017-10-19 PROCEDURE — 80053 COMPREHEN METABOLIC PANEL: CPT

## 2017-10-19 PROCEDURE — 84145 PROCALCITONIN (PCT): CPT

## 2017-10-19 PROCEDURE — 71250 CT THORAX DX C-: CPT

## 2017-10-19 PROCEDURE — 87040 BLOOD CULTURE FOR BACTERIA: CPT

## 2017-10-19 PROCEDURE — 82746 ASSAY OF FOLIC ACID SERUM: CPT

## 2017-10-19 PROCEDURE — 82607 VITAMIN B-12: CPT

## 2017-10-19 PROCEDURE — 83550 IRON BINDING TEST: CPT

## 2017-10-19 PROCEDURE — 83735 ASSAY OF MAGNESIUM: CPT

## 2017-10-19 PROCEDURE — 36415 COLL VENOUS BLD VENIPUNCTURE: CPT

## 2017-10-19 PROCEDURE — T1013: CPT

## 2017-10-19 PROCEDURE — 84484 ASSAY OF TROPONIN QUANT: CPT

## 2017-10-19 PROCEDURE — 85045 AUTOMATED RETICULOCYTE COUNT: CPT

## 2017-10-19 PROCEDURE — 96374 THER/PROPH/DIAG INJ IV PUSH: CPT

## 2017-10-19 PROCEDURE — 85027 COMPLETE CBC AUTOMATED: CPT

## 2017-10-19 PROCEDURE — 87086 URINE CULTURE/COLONY COUNT: CPT

## 2017-10-19 PROCEDURE — 70450 CT HEAD/BRAIN W/O DYE: CPT

## 2017-10-19 PROCEDURE — 99285 EMERGENCY DEPT VISIT HI MDM: CPT | Mod: 25

## 2017-10-19 PROCEDURE — 84466 ASSAY OF TRANSFERRIN: CPT

## 2017-10-19 PROCEDURE — 82962 GLUCOSE BLOOD TEST: CPT

## 2017-10-19 PROCEDURE — 85730 THROMBOPLASTIN TIME PARTIAL: CPT

## 2017-10-19 PROCEDURE — 76775 US EXAM ABDO BACK WALL LIM: CPT

## 2017-10-19 PROCEDURE — 84443 ASSAY THYROID STIM HORMONE: CPT

## 2017-10-19 PROCEDURE — 85610 PROTHROMBIN TIME: CPT

## 2018-05-02 LAB — LACTATE BLDV-MCNC: 1.1 MMOL/L — SIGNIFICANT CHANGE UP (ref 0.5–2)

## 2019-09-03 NOTE — PHYSICAL THERAPY INITIAL EVALUATION ADULT - LEVEL OF INDEPENDENCE: SUPINE/SIT, REHAB EVAL
Stable  Pt  requires wheel chair assistance  She can transfer from [facility] to toilet and bed  She is able to ambulate short distances with a walker  She continues to receive Botox injections once a month and is doing exercises at home for strengthening  moderate assist (50% patients effort)

## 2019-10-01 ENCOUNTER — INPATIENT (INPATIENT)
Facility: HOSPITAL | Age: 84
LOS: 6 days | Discharge: NURSING, REHAB OR LONG-TERM CARE | DRG: 470 | End: 2019-10-08
Attending: INTERNAL MEDICINE | Admitting: INTERNAL MEDICINE
Payer: MEDICARE

## 2019-10-01 ENCOUNTER — EMERGENCY (EMERGENCY)
Facility: HOSPITAL | Age: 84
LOS: 1 days | Discharge: HOME | End: 2019-10-01
Attending: EMERGENCY MEDICINE
Payer: MEDICARE

## 2019-10-01 ENCOUNTER — EMERGENCY (EMERGENCY)
Facility: HOSPITAL | Age: 84
LOS: 1 days | Discharge: HOME | End: 2019-10-01
Attending: EMERGENCY MEDICINE | Admitting: EMERGENCY MEDICINE
Payer: MEDICARE

## 2019-10-01 ENCOUNTER — TRANSCRIPTION ENCOUNTER (OUTPATIENT)
Age: 84
End: 2019-10-01

## 2019-10-01 VITALS
RESPIRATION RATE: 18 BRPM | TEMPERATURE: 99 F | OXYGEN SATURATION: 95 % | HEART RATE: 57 BPM | DIASTOLIC BLOOD PRESSURE: 72 MMHG | SYSTOLIC BLOOD PRESSURE: 134 MMHG

## 2019-10-01 VITALS
RESPIRATION RATE: 18 BRPM | DIASTOLIC BLOOD PRESSURE: 66 MMHG | HEIGHT: 69 IN | HEART RATE: 85 BPM | SYSTOLIC BLOOD PRESSURE: 122 MMHG | OXYGEN SATURATION: 94 % | WEIGHT: 195.11 LBS | TEMPERATURE: 98 F

## 2019-10-01 VITALS
SYSTOLIC BLOOD PRESSURE: 126 MMHG | OXYGEN SATURATION: 98 % | RESPIRATION RATE: 18 BRPM | TEMPERATURE: 98 F | HEART RATE: 86 BPM | DIASTOLIC BLOOD PRESSURE: 78 MMHG

## 2019-10-01 DIAGNOSIS — Z78.9 OTHER SPECIFIED HEALTH STATUS: Chronic | ICD-10-CM

## 2019-10-01 DIAGNOSIS — S72.009A FRACTURE OF UNSPECIFIED PART OF NECK OF UNSPECIFIED FEMUR, INITIAL ENCOUNTER FOR CLOSED FRACTURE: ICD-10-CM

## 2019-10-01 PROBLEM — H54.40 BLINDNESS, ONE EYE, UNSPECIFIED EYE: Chronic | Status: ACTIVE | Noted: 2017-08-23

## 2019-10-01 PROBLEM — I10 ESSENTIAL (PRIMARY) HYPERTENSION: Chronic | Status: ACTIVE | Noted: 2017-08-23

## 2019-10-01 PROBLEM — F03.90 UNSPECIFIED DEMENTIA, UNSPECIFIED SEVERITY, WITHOUT BEHAVIORAL DISTURBANCE, PSYCHOTIC DISTURBANCE, MOOD DISTURBANCE, AND ANXIETY: Chronic | Status: ACTIVE | Noted: 2017-08-23

## 2019-10-01 LAB
ALBUMIN SERPL ELPH-MCNC: 4.2 G/DL — SIGNIFICANT CHANGE UP (ref 3.3–5.2)
ALP SERPL-CCNC: 108 U/L — SIGNIFICANT CHANGE UP (ref 40–120)
ALT FLD-CCNC: 15 U/L — SIGNIFICANT CHANGE UP
ANION GAP SERPL CALC-SCNC: 15 MMOL/L — SIGNIFICANT CHANGE UP (ref 5–17)
AST SERPL-CCNC: 32 U/L — SIGNIFICANT CHANGE UP
BASOPHILS # BLD AUTO: 0.03 K/UL — SIGNIFICANT CHANGE UP (ref 0–0.2)
BASOPHILS NFR BLD AUTO: 0.2 % — SIGNIFICANT CHANGE UP (ref 0–2)
BILIRUB SERPL-MCNC: 0.9 MG/DL — SIGNIFICANT CHANGE UP (ref 0.4–2)
BUN SERPL-MCNC: 24 MG/DL — HIGH (ref 8–20)
CALCIUM SERPL-MCNC: 9.4 MG/DL — SIGNIFICANT CHANGE UP (ref 8.6–10.2)
CHLORIDE SERPL-SCNC: 104 MMOL/L — SIGNIFICANT CHANGE UP (ref 98–107)
CO2 SERPL-SCNC: 24 MMOL/L — SIGNIFICANT CHANGE UP (ref 22–29)
CREAT SERPL-MCNC: 1.81 MG/DL — HIGH (ref 0.5–1.3)
EOSINOPHIL # BLD AUTO: 0.02 K/UL — SIGNIFICANT CHANGE UP (ref 0–0.5)
EOSINOPHIL NFR BLD AUTO: 0.1 % — SIGNIFICANT CHANGE UP (ref 0–6)
GLUCOSE SERPL-MCNC: 149 MG/DL — HIGH (ref 70–115)
HCT VFR BLD CALC: 44.1 % — SIGNIFICANT CHANGE UP (ref 39–50)
HGB BLD-MCNC: 14.9 G/DL — SIGNIFICANT CHANGE UP (ref 13–17)
IMM GRANULOCYTES NFR BLD AUTO: 0.8 % — SIGNIFICANT CHANGE UP (ref 0–1.5)
LYMPHOCYTES # BLD AUTO: 0.63 K/UL — LOW (ref 1–3.3)
LYMPHOCYTES # BLD AUTO: 4.6 % — LOW (ref 13–44)
MCHC RBC-ENTMCNC: 33.8 GM/DL — SIGNIFICANT CHANGE UP (ref 32–36)
MCHC RBC-ENTMCNC: 34.7 PG — HIGH (ref 27–34)
MCV RBC AUTO: 102.8 FL — HIGH (ref 80–100)
MONOCYTES # BLD AUTO: 0.88 K/UL — SIGNIFICANT CHANGE UP (ref 0–0.9)
MONOCYTES NFR BLD AUTO: 6.5 % — SIGNIFICANT CHANGE UP (ref 2–14)
NEUTROPHILS # BLD AUTO: 11.95 K/UL — HIGH (ref 1.8–7.4)
NEUTROPHILS NFR BLD AUTO: 87.8 % — HIGH (ref 43–77)
PLATELET # BLD AUTO: 211 K/UL — SIGNIFICANT CHANGE UP (ref 150–400)
POTASSIUM SERPL-MCNC: 4.3 MMOL/L — SIGNIFICANT CHANGE UP (ref 3.5–5.3)
POTASSIUM SERPL-SCNC: 4.3 MMOL/L — SIGNIFICANT CHANGE UP (ref 3.5–5.3)
PROT SERPL-MCNC: 7.8 G/DL — SIGNIFICANT CHANGE UP (ref 6.6–8.7)
RBC # BLD: 4.29 M/UL — SIGNIFICANT CHANGE UP (ref 4.2–5.8)
RBC # FLD: 12 % — SIGNIFICANT CHANGE UP (ref 10.3–14.5)
SODIUM SERPL-SCNC: 143 MMOL/L — SIGNIFICANT CHANGE UP (ref 135–145)
WBC # BLD: 13.62 K/UL — HIGH (ref 3.8–10.5)
WBC # FLD AUTO: 13.62 K/UL — HIGH (ref 3.8–10.5)

## 2019-10-01 PROCEDURE — 99222 1ST HOSP IP/OBS MODERATE 55: CPT | Mod: 57

## 2019-10-01 PROCEDURE — 73502 X-RAY EXAM HIP UNI 2-3 VIEWS: CPT | Mod: 26,RT

## 2019-10-01 PROCEDURE — 99282 EMERGENCY DEPT VISIT SF MDM: CPT

## 2019-10-01 PROCEDURE — 99285 EMERGENCY DEPT VISIT HI MDM: CPT

## 2019-10-01 PROCEDURE — 73552 X-RAY EXAM OF FEMUR 2/>: CPT | Mod: 26,RT

## 2019-10-01 PROCEDURE — 99283 EMERGENCY DEPT VISIT LOW MDM: CPT

## 2019-10-01 PROCEDURE — 76377 3D RENDER W/INTRP POSTPROCES: CPT | Mod: 26

## 2019-10-01 PROCEDURE — 99222 1ST HOSP IP/OBS MODERATE 55: CPT

## 2019-10-01 PROCEDURE — 71045 X-RAY EXAM CHEST 1 VIEW: CPT | Mod: 26

## 2019-10-01 PROCEDURE — 72192 CT PELVIS W/O DYE: CPT | Mod: 26

## 2019-10-01 PROCEDURE — 70450 CT HEAD/BRAIN W/O DYE: CPT | Mod: 26

## 2019-10-01 RX ORDER — SODIUM CHLORIDE 9 MG/ML
1000 INJECTION, SOLUTION INTRAVENOUS
Refills: 0 | Status: DISCONTINUED | OUTPATIENT
Start: 2019-10-01 | End: 2019-10-02

## 2019-10-01 RX ORDER — ERYTHROMYCIN BASE 5 MG/GRAM
1 OINTMENT (GRAM) OPHTHALMIC (EYE)
Qty: 1 | Refills: 0
Start: 2019-10-01 | End: 2019-10-07

## 2019-10-01 RX ORDER — OXYCODONE HYDROCHLORIDE 5 MG/1
5 TABLET ORAL EVERY 4 HOURS
Refills: 0 | Status: DISCONTINUED | OUTPATIENT
Start: 2019-10-01 | End: 2019-10-02

## 2019-10-01 RX ORDER — NEOMYCIN/POLYMYXIN B/DEXAMETHA 0.1 %
1 SUSPENSION, DROPS(FINAL DOSAGE FORM)(ML) OPHTHALMIC (EYE) ONCE
Refills: 0 | Status: DISCONTINUED | OUTPATIENT
Start: 2019-10-01 | End: 2019-10-02

## 2019-10-01 RX ORDER — CEFAZOLIN SODIUM 1 G
2000 VIAL (EA) INJECTION ONCE
Refills: 0 | Status: DISCONTINUED | OUTPATIENT
Start: 2019-10-02 | End: 2019-10-02

## 2019-10-01 RX ORDER — ACETAMINOPHEN 500 MG
650 TABLET ORAL EVERY 6 HOURS
Refills: 0 | Status: DISCONTINUED | OUTPATIENT
Start: 2019-10-01 | End: 2019-10-02

## 2019-10-01 RX ORDER — CIPROFLOXACIN HCL 0.3 %
2 DROPS OPHTHALMIC (EYE)
Qty: 1 | Refills: 0
Start: 2019-10-01 | End: 2019-10-07

## 2019-10-01 RX ORDER — SODIUM CHLORIDE 9 MG/ML
3 INJECTION INTRAMUSCULAR; INTRAVENOUS; SUBCUTANEOUS ONCE
Refills: 0 | Status: COMPLETED | OUTPATIENT
Start: 2019-10-01 | End: 2019-10-01

## 2019-10-01 RX ORDER — OXYCODONE HYDROCHLORIDE 5 MG/1
10 TABLET ORAL EVERY 4 HOURS
Refills: 0 | Status: DISCONTINUED | OUTPATIENT
Start: 2019-10-01 | End: 2019-10-02

## 2019-10-01 NOTE — ED PROVIDER NOTE - ATTENDING CONTRIBUTION TO CARE
I, Troy Thompson, have personally performed a face to face diagnostic evaluation on this patient. I have reviewed the ACP note and agree with the history, exam and plan of care, except as noted.    89 yo M hx of DM and right eye cataracts p/w redness and purulent discharge. n otrauma, no fever, no pain with eye movement. Patient states that he can see out of that eye. On exam, diffuse erythema of cornea with chemosis, cataracts overing the pupil, no floresence up take. Will discharge home on Ciloxin and erythromucin ointment.

## 2019-10-01 NOTE — CONSULT NOTE ADULT - SUBJECTIVE AND OBJECTIVE BOX
Pt Name: JANAY COLLIER    MRN: [id number]      Patient is a 89y Male presenting to the emergency department with a chief complaint of right hip pain. Pt was brought in by EMS after pt found laying on ground on side of the road. Unable to obtain further history due to pt poor historian/dementia.      REVIEW OF SYSTEMS- unable to obtain due to pt poor historian/dementia.    General: Alert, responsive, in NAD    Skin/Breast: No rashes, no pruritis   	  Musculoskeletal: SEE HPI.    Neurological: No sensory or motor changes.     Psychiatric: No anxiety or depression.    Hematology/Lymphatics: No swelling.    Endocrine: No Hx of diabetes.    ROS is otherwise negative.    PAST MEDICAL & SURGICAL HISTORY:  PAST MEDICAL & SURGICAL HISTORY:  Blindness of one eye  CHF (congestive heart failure)  Dementia  Hypertension  Poor historian  Poor historian      Allergies: No Known Allergies      Medications: dexamethasone/neomycin/polymyxin Ointment 1 Application(s) Right EYE Once  sodium chloride 0.9% lock flush 3 milliLiter(s) IV Push once      FAMILY HISTORY:  No pertinent family history in first degree relatives  : non-contributory    Social History: Unknown    Ambulation: Walking independently [  ] With Cane [ ] With Walker [ ]  Bedbound [ ] - [x] Unknown                          14.9   13.62 )-----------( 211      ( 01 Oct 2019 21:57 )             44.1       10-01    143  |  104  |  24.0<H>  ----------------------------<  149<H>  4.3   |  24.0  |  1.81<H>    Ca    9.4      01 Oct 2019 21:57    TPro  7.8  /  Alb  4.2  /  TBili  0.9  /  DBili  x   /  AST  32  /  ALT  15  /  AlkPhos  108  10-01      Vital Signs Last 24 Hrs  T(C): 36.5 (01 Oct 2019 19:22), Max: 37 (01 Oct 2019 10:17)  T(F): 97.7 (01 Oct 2019 19:22), Max: 98.6 (01 Oct 2019 10:17)  HR: 85 (01 Oct 2019 19:22) (57 - 86)  BP: 122/66 (01 Oct 2019 19:22) (122/66 - 134/72)  BP(mean): --  RR: 18 (01 Oct 2019 19:22) (18 - 18)  SpO2: 94% (01 Oct 2019 19:22) (94% - 98%)    Daily Height in cm: 175.26 (01 Oct 2019 19:22)    Daily       PHYSICAL EXAM:      Appearance: Alert, responsive, in no acute distress.    Neurological: Sensation is grossly intact to light touch. 5/5 motor function of all extremities. No focal deficits or weaknesses found.    Skin: no rash on visible skin. Skin is clean, dry and intact. No bleeding. No abrasions. No ulcerations.    Vascular: 2+ distal pulses. Cap refill < 2 sec. No signs of venous insufficiency or stasis. No extremity ulcerations. No cyanosis.    Musculoskeletal:         Left Upper Extremity:       Right Upper Extremity:       Left Lower Extremity:       Right Lower Extremity:    Imaging Studies:     A/P:  Pt is a  89y Male with a right femoral neck fx after being found on the ground on the side of the road today.    PLAN d/w Dr. Brasher:   * NPO for OR tomorrow  * IV fluids ordered and to start once NPO  * Pre-operative ABX ordered  *Routine daily anticoagulation held for OR  * Medical clearance requested for procedure  * Bed rest Pt Name: JANAY COLLIER    MRN: [id number]      Patient is a 89y Male presenting to the emergency department with a chief complaint of right hip pain. Pt was brought in by EMS after pt found laying on ground on side of the road. Unable to obtain further history due to pt poor historian/dementia.      REVIEW OF SYSTEMS- unable to obtain due to pt poor historian/dementia.    General: Alert, responsive, in NAD    Skin/Breast: No rashes, no pruritis   	  Musculoskeletal: SEE HPI.    Neurological: No sensory or motor changes.     Psychiatric: No anxiety or depression.    Hematology/Lymphatics: No swelling.    Endocrine: No Hx of diabetes.    ROS is otherwise negative.    PAST MEDICAL & SURGICAL HISTORY:  PAST MEDICAL & SURGICAL HISTORY:  Blindness of one eye  CHF (congestive heart failure)  Dementia  Hypertension  Poor historian  Poor historian      Allergies: No Known Allergies      Medications: dexamethasone/neomycin/polymyxin Ointment 1 Application(s) Right EYE Once  sodium chloride 0.9% lock flush 3 milliLiter(s) IV Push once      FAMILY HISTORY:  No pertinent family history in first degree relatives  : non-contributory    Social History: Unknown    Ambulation: Walking independently [  ] With Cane [ ] With Walker [ ]  Bedbound [ ] - [x] Unknown                          14.9   13.62 )-----------( 211      ( 01 Oct 2019 21:57 )             44.1       10-01    143  |  104  |  24.0<H>  ----------------------------<  149<H>  4.3   |  24.0  |  1.81<H>    Ca    9.4      01 Oct 2019 21:57    TPro  7.8  /  Alb  4.2  /  TBili  0.9  /  DBili  x   /  AST  32  /  ALT  15  /  AlkPhos  108  10-01      Vital Signs Last 24 Hrs  T(C): 36.5 (01 Oct 2019 19:22), Max: 37 (01 Oct 2019 10:17)  T(F): 97.7 (01 Oct 2019 19:22), Max: 98.6 (01 Oct 2019 10:17)  HR: 85 (01 Oct 2019 19:22) (57 - 86)  BP: 122/66 (01 Oct 2019 19:22) (122/66 - 134/72)  BP(mean): --  RR: 18 (01 Oct 2019 19:22) (18 - 18)  SpO2: 94% (01 Oct 2019 19:22) (94% - 98%)    Daily Height in cm: 175.26 (01 Oct 2019 19:22)    Daily       PHYSICAL EXAM:      Appearance: Alert, responsive, in no acute distress. Physical exam limited secondary to patients dementia unable to follow commands appropriately. D/w Dr. Zuleta-- likely pts baseline and he is aware of pts confusion.    Neurological: Unable to elicit reliable sensory exam.    Skin: no rash on visible skin. Skin is clean, dry and intact. No bleeding. No abrasions. No ulcerations.    Vascular: 2+ distal DP/PT/radial pulses. Cap refill < 2 sec. No signs of venous insufficiency or stasis. No extremity ulcerations. No cyanosis.    Musculoskeletal:         Left Upper Extremity:  + NROM. Non-tender. No signs of trauma.        Right Upper Extremity:  + NROM. Non-tender. No signs of trauma.        Left Lower Extremity:  + NROM. Non-tender. No signs of trauma.        Right Lower Extremity: Pt with RLE bent at the knee and unwilling to move or straighten. +TTP of the right hip. 2+ DP pulse. Compartments soft and compressible. No open wound/active bleeding at fracture site.    Imaging Studies: < from: CT Pelvis Bony Only No Cont (10.01.19 @ 21:57) >    ******PRELIMINARY REPORT******    ******PRELIMINARY REPORT******           EXAM:  CT PELVIS BONY ONLY                         EXAM:  CT 3D RECONSTRUCT W DARWIN                          PROCEDURE DATE:  10/01/2019        ******PRELIMINARY REPORT******    ******PRELIMINARY REPORT******          INTERPRETATION:  [facility] PRELIMINARY REPORT    PROCEDURE INFORMATION:   Exam: CT Pelvis without contrast; Skeletal   Exam date and time: 10/1/2019 9:48 PM   Clinical history: 89 years old, male; Hip pain; Right hip     TECHNIQUE:   Imaging protocol: Computed tomography images of the pelvis without   contrast.   Exam focused on the skeletal structures.   3D rendering: MIP reconstructed images were created and reviewed.   Radiation optimization: All CT scans at this facility use at least one of   these   dose optimization techniques: automated exposure control; mA and/or kV   adjustment per patient size (includes targeted exams where dose is   matched to   clinical indication); or iterative reconstruction.     COMPARISON:   DX XR HIP WITH PELVIS 2 OR 3 VIEWS RIGHT 10/1/2019 9:13 PM     FINDINGS:   Appendix: Normal appendix.   Bladder: Urinary bladder is distended and demonstrates trabeculations.   Reproductive: Substantial prostatomegaly with central prostate   enlargement.   Bones/joints: Acute comminuted right femoral neck fracture.   Soft tissues: Unremarkable.     IMPRESSION:   1. Substantial prostatomegaly with central prostate enlargement.   2. Acute comminuted right femoralneck fracture.          ******PRELIMINARY REPORT******    ******PRELIMINARY REPORT******              KIAH FRANK M.D.;[facility]IOLOG[facility]    < end of copied text >      A/P:  Pt is a  89y Male with a right femoral neck fx after being found on the ground on the side of the road today.    PLAN d/w Dr. Brasher:   * NPO for OR tomorrow  * IV fluids ordered and to start once NPO  * Pre-operative ABX ordered  *Routine daily anticoagulation held for OR  * Medical clearance requested for procedure  * Bed rest

## 2019-10-01 NOTE — ED PROVIDER NOTE - ATTENDING CONTRIBUTION TO CARE
I, Troy Thompson, have personally performed a face to face diagnostic evaluation on this patient. I have reviewed the ACP note and agree with the history, exam and plan of care, except as noted.    Patient was seen earlier in the ED for conjectivitis and discharged home with eye drops. He was found wondering around. patient from group home. He is not a harm to self or other. Transportation set up to take patient back.

## 2019-10-01 NOTE — ED PROVIDER NOTE - CLINICAL SUMMARY MEDICAL DECISION MAKING FREE TEXT BOX
Will perform stat CT as per radiology  there were no changes. Stable frontal angioma. Will perform labs for right leg pain, will reassess.

## 2019-10-01 NOTE — H&P ADULT - ASSESSMENT
89M pmh stage I diastolic hf, dementia, HTN, BPH presents after being found down, found to have R femoral neck fracture.

## 2019-10-01 NOTE — ED ADULT TRIAGE NOTE - DIRECT TO ROOM CARE INITIATED:
Patient reports her right arm is hurting. And she felt a lump and she is not sure if its scare tissue. Patient is worried and would like to be evaluated today. 01-Oct-2019 10:18

## 2019-10-01 NOTE — ED ADULT TRIAGE NOTE - CHIEF COMPLAINT QUOTE
Pt A&Ox2 states "I don't know what happened."  BIBA c/o hip and right leg pain after being found on side of road. Patient is confused just released from hospital still had band on, [facility] found to be missing unknown what baseline is, has green discharge from right eye.

## 2019-10-01 NOTE — H&P ADULT - PROBLEM SELECTOR PLAN 4
pt with 50% increase of creatinine based on most recent lab result 2 years ago  likely 2/2 dehydration  will given gentle hydration  continue to trend.

## 2019-10-01 NOTE — H&P ADULT - PROBLEM SELECTOR PLAN 2
pt with severe bilateral bacterial conjunctivitis  will c/w cipro gtt and erythromycin ointment  consider optho consult if no improvement.

## 2019-10-01 NOTE — ED PROVIDER NOTE - OBJECTIVE STATEMENT
89 y/o M pt with significant PMHx of presents to the ED c/o  No further complaints at this time. 89 y/o M pt with significant PMHx of CHF, Dementia and HTN presents to the ED BIBEMS c/o right leg pain. EMS states pt was found on the side of the road. Pt only knows his name but doesn't what happened. Pt was discharged yesterday from the hospital. Pt is a poor historian.   No further complaints at this time.

## 2019-10-01 NOTE — H&P ADULT - NSHPPHYSICALEXAM_GEN_ALL_CORE
Vital Signs Last 24 Hrs  T(C): 36.5 (01 Oct 2019 19:22), Max: 37 (01 Oct 2019 10:17)  T(F): 97.7 (01 Oct 2019 19:22), Max: 98.6 (01 Oct 2019 10:17)  HR: 82 (02 Oct 2019 00:24) (57 - 86)  BP: 153/84 (02 Oct 2019 00:24) (122/66 - 153/84)  BP(mean): --  RR: 20 (02 Oct 2019 00:24) (18 - 20)  SpO2: 94% (02 Oct 2019 00:24) (94% - 98%)    GENERAL: NAD  HEENT:  Atraumatic, Normocephalic, MMM, no oropharyngeal lesions  EYES: diffuse thick purulent discharge in bilateral conjunctiva with R worse than L. b/l conjunctival injection  NECK: Supple, No JVD, no throat tenderness.  CHEST/LUNG: Clear to auscultation bilaterally; No wheezes, rales, or rhonchi  HEART: Regular rate and rhythm; No murmurs, rubs, or gallops  ABDOMEN: Soft, Nontender, Nondistended; Bowel sounds present  EXTREMITIES:  2+ Peripheral Pulses, R leg externally rotated and fixed, pain with movement. No edema  PSYCH: AAOx1-2, unable to answer most questions appropriately  NEUROLOGY: moves all extremities spontaneously. no sensory deficits  SKIN: No rashes or lesions

## 2019-10-01 NOTE — ED ADULT TRIAGE NOTE - CHIEF COMPLAINT QUOTE
Patient BIBA, ambulatory offering no complaints, as per EMS patient was just discharged from ED looking for a taxi home

## 2019-10-01 NOTE — ED ADULT NURSE NOTE - OBJECTIVE STATEMENT
Pt care assumed at 2100, in no apparent distress at this time. Airway patent, breathing spontaneous and nonlabored. Pt poor historian, pt states he is unsure what happened. Pt found by [facility] on side of the road with hospital bracelet on, was d/c yesterday. pt c/o right hip and leg pain. Pulses palpable, no bleeding or deformity noted. Yellow/green discharge noted to eye. Awaiting x ray.

## 2019-10-01 NOTE — ED PROVIDER NOTE - PATIENT PORTAL LINK FT
You can access the FollowMyHealth Patient Portal offered by [facility] by registering at the following website: [email or web address]. By joining [employer]’s FollowMyHealth portal, you will also be able to view your health information using other applications (apps) compatible with our system.

## 2019-10-01 NOTE — ED PROVIDER NOTE - ENMT, MLM
Airway patent, Nasal mucosa clear. Mouth with normal mucosa. poor dentition, Throat has no vesicles, no oropharyngeal exudates and uvula is midline.

## 2019-10-01 NOTE — CONSULT NOTE ADULT - ATTENDING COMMENTS
Ortho Trauma Attending:  Agree with above PA note.  Note edited where necessary.  Orthopedic Surgery is ready to proceed with surgery pending medical optimization and adequate Operating Room availability. Risks of surgical delay discussed with other providers and staff. Please call with any questions or concerns.     Al Diane MD  Orthopaedic Trauma Surgery

## 2019-10-01 NOTE — H&P ADULT - NSICDXPASTMEDICALHX_GEN_ALL_CORE_FT
PAST MEDICAL HISTORY:  Blindness of one eye     CHF (congestive heart failure) stage I diastolic    Dementia     Hypertension     Poor historian

## 2019-10-01 NOTE — H&P ADULT - PROBLEM SELECTOR PLAN 6
pt with stage I diastolic HF based on TTE from 2017  does no currently appear fluid overloaded.  hold lasix in setting of lolis.

## 2019-10-01 NOTE — H&P ADULT - HISTORY OF PRESENT ILLNESS
89M presents after being found down, found to have hip fracture. 89M pmh stage I diastolic hf, dementia, HTN, BPH presents after being found down, found to have hip fracture. [place] and was seen earlier today for conjunctivitis. Pt discharged from ED back to group home. Pt is very poor historian despite use of ED . Unclear what happened after discharge, but was found down on ground by EMS. Pt only able to stay was walking across street when fell on right side and was unable to get up. Unable to offer any preceding events.     In ED, pt was hemodynamically stable. Xray suggestive of R femoral neck fracture.

## 2019-10-01 NOTE — H&P ADULT - PROBLEM SELECTOR PLAN 3
likely 2/2 conjunctivitis.  will check ua otherwise no other source of infection  pt not septic  continue to trend

## 2019-10-01 NOTE — ED PROVIDER NOTE - CLINICAL SUMMARY MEDICAL DECISION MAKING FREE TEXT BOX
no medical complaints. pt wants a cab ride home will, coordinate a cab for pt no medical complaints. pt wants a cab ride home will, SW to coordinate a cab for pt

## 2019-10-01 NOTE — ED PROVIDER NOTE - OBJECTIVE STATEMENT
89 yo M PT, PmHx DM, presents to ED complaining of R eye redness x 3 days. Pt states he has greenish discharge from right eye, with associated redness, and swelling. Pt denies recent trauma, fever, chills, visual changes, headache, rash, pain on eye movement, and nay other acute symptoms at this time.

## 2019-10-01 NOTE — H&P ADULT - NSHPLABSRESULTS_GEN_ALL_CORE
14.9   13.62 )-----------( 211      ( 01 Oct 2019 21:57 )             44.1       10-01    143  |  104  |  24.0<H>  ----------------------------<  149<H>  4.3   |  24.0  |  1.81<H>    Ca    9.4      01 Oct 2019 21:57    TPro  7.8  /  Alb  4.2  /  TBili  0.9  /  DBili  x   /  AST  32  /  ALT  15  /  AlkPhos  108  10-01                  PT/INR - ( 02 Oct 2019 02:57 )   PT: 12.6 sec;   INR: 1.09 ratio         PTT - ( 02 Oct 2019 02:57 )  PTT:31.1 sec    Lactate Trend    CAPILLARY BLOOD GLUCOSE      POCT Blood Glucose.: 135 mg/dL (01 Oct 2019 19:28)        RADIOLOGY, EKG & ADDITIONAL TESTS: Reviewed.   < from: CT Head No Cont (10.01.19 @ 19:52) >    IMPRESSION:    No acute intracranial hemorrhage, mass effect, or evidence of acute   displaced calvarial fracture.    < end of copied text >    < from: CT Pelvis Bony Only No Cont (10.01.19 @ 21:57) >    IMPRESSION:   1. Substantial prostatomegaly with central prostate enlargement.   2. Acute comminuted right femoralneck fracture.    < end of copied text > 14.9   13.62 )-----------( 211      ( 01 Oct 2019 21:57 )             44.1       10-01    143  |  104  |  24.0<H>  ----------------------------<  149<H>  4.3   |  24.0  |  1.81<H>    Ca    9.4      01 Oct 2019 21:57    TPro  7.8  /  Alb  4.2  /  TBili  0.9  /  DBili  x   /  AST  32  /  ALT  15  /  AlkPhos  108  10-01                  PT/INR - ( 02 Oct 2019 02:57 )   PT: 12.6 sec;   INR: 1.09 ratio         PTT - ( 02 Oct 2019 02:57 )  PTT:31.1 sec    Lactate Trend    CAPILLARY BLOOD GLUCOSE      POCT Blood Glucose.: 135 mg/dL (01 Oct 2019 19:28)        RADIOLOGY, EKG & ADDITIONAL TESTS: Reviewed.   < from: CT Head No Cont (10.01.19 @ 19:52) >    IMPRESSION:    No acute intracranial hemorrhage, mass effect, or evidence of acute   displaced calvarial fracture.    < end of copied text >    < from: CT Pelvis Bony Only No Cont (10.01.19 @ 21:57) >    IMPRESSION:   1. Substantial prostatomegaly with central prostate enlargement.   2. Acute comminuted right femoralneck fracture.    < end of copied text >    CXR- poor inspiratory effort, but grossly clear. 14.9   13.62 )-----------( 211      ( 01 Oct 2019 21:57 )             44.1       10-01    143  |  104  |  24.0<H>  ----------------------------<  149<H>  4.3   |  24.0  |  1.81<H>    Ca    9.4      01 Oct 2019 21:57    TPro  7.8  /  Alb  4.2  /  TBili  0.9  /  DBili  x   /  AST  32  /  ALT  15  /  AlkPhos  108  10-01          PT/INR - ( 02 Oct 2019 02:57 )   PT: 12.6 sec;   INR: 1.09 ratio         PTT - ( 02 Oct 2019 02:57 )  PTT:31.1 sec    Lactate Trend    CAPILLARY BLOOD GLUCOSE      POCT Blood Glucose.: 135 mg/dL (01 Oct 2019 19:28)        RADIOLOGY, EKG & ADDITIONAL TESTS: Reviewed.   < from: CT Head No Cont (10.01.19 @ 19:52) >    IMPRESSION:    No acute intracranial hemorrhage, mass effect, or evidence of acute   displaced calvarial fracture.    < end of copied text >    < from: CT Pelvis Bony Only No Cont (10.01.19 @ 21:57) >    IMPRESSION:   1. Substantial prostatomegaly with central prostate enlargement.   2. Acute comminuted right femoralneck fracture.    < end of copied text >    CXR- poor inspiratory effort, but grossly clear.    EKG- pt refused

## 2019-10-01 NOTE — ED PROVIDER NOTE - PATIENT PORTAL LINK FT
You can access the FollowMyHealth Patient Portal offered by [facility] by registering at the following website: [email or web address]. By joining [employer]’s FollowMyHealth portal, you will also be able to view your health information using other applications (apps) compatible with our system.

## 2019-10-01 NOTE — ED PROVIDER NOTE - OBJECTIVE STATEMENT
89 yo M, BIBA for walking along street. Pt has no medical complaints at this time. Pt seen today in ED and dx with conjunctivitis. Pt denies fever, chills, headache, visual changes, SIHI, and any other acute symptoms.

## 2019-10-01 NOTE — H&P ADULT - PROBLEM SELECTOR PLAN 1
pt with femoral neck fracture, presumably 2/2 mechanical fall however mechanism unclear as pt poor historian and no witnessess  Ortho consulted and plan for OR in AM.  will keep patient NPO  will check EKG, if ekg is not concerning than no medical contraindications to surgery, benefits of surgery would outweigh risks. pt with femoral neck fracture, presumably 2/2 mechanical fall however mechanism unclear as pt poor historian and no witnessess  Ortho consulted and plan for OR in AM.  will keep patient NPO  pt refusing EKG, no known hx acs, not complaining of chest pain, sob, dizziness, unlikely to change plan.  no medical contraindications to surgery, benefits of surgery would outweigh risks.

## 2019-10-02 ENCOUNTER — TRANSCRIPTION ENCOUNTER (OUTPATIENT)
Age: 84
End: 2019-10-02

## 2019-10-02 DIAGNOSIS — I50.9 HEART FAILURE, UNSPECIFIED: ICD-10-CM

## 2019-10-02 DIAGNOSIS — S72.009A FRACTURE OF UNSPECIFIED PART OF NECK OF UNSPECIFIED FEMUR, INITIAL ENCOUNTER FOR CLOSED FRACTURE: ICD-10-CM

## 2019-10-02 DIAGNOSIS — F03.90 UNSPECIFIED DEMENTIA WITHOUT BEHAVIORAL DISTURBANCE: ICD-10-CM

## 2019-10-02 DIAGNOSIS — I10 ESSENTIAL (PRIMARY) HYPERTENSION: ICD-10-CM

## 2019-10-02 DIAGNOSIS — D72.829 ELEVATED WHITE BLOOD CELL COUNT, UNSPECIFIED: ICD-10-CM

## 2019-10-02 DIAGNOSIS — N17.9 ACUTE KIDNEY FAILURE, UNSPECIFIED: ICD-10-CM

## 2019-10-02 DIAGNOSIS — H10.9 UNSPECIFIED CONJUNCTIVITIS: ICD-10-CM

## 2019-10-02 LAB
APTT BLD: 31.1 SEC — SIGNIFICANT CHANGE UP (ref 27.5–36.3)
BLD GP AB SCN SERPL QL: SIGNIFICANT CHANGE UP
INR BLD: 1.09 RATIO — SIGNIFICANT CHANGE UP (ref 0.88–1.16)
PROTHROM AB SERPL-ACNC: 12.6 SEC — SIGNIFICANT CHANGE UP (ref 10–12.9)

## 2019-10-02 PROCEDURE — 99232 SBSQ HOSP IP/OBS MODERATE 35: CPT

## 2019-10-02 PROCEDURE — 73501 X-RAY EXAM HIP UNI 1 VIEW: CPT | Mod: 26,RT

## 2019-10-02 PROCEDURE — 27236 TREAT THIGH FRACTURE: CPT | Mod: RT

## 2019-10-02 RX ORDER — MORPHINE SULFATE 50 MG/1
4 CAPSULE, EXTENDED RELEASE ORAL ONCE
Refills: 0 | Status: DISCONTINUED | OUTPATIENT
Start: 2019-10-02 | End: 2019-10-08

## 2019-10-02 RX ORDER — TRANEXAMIC ACID 100 MG/ML
1000 INJECTION, SOLUTION INTRAVENOUS ONCE
Refills: 0 | Status: DISCONTINUED | OUTPATIENT
Start: 2019-10-02 | End: 2019-10-02

## 2019-10-02 RX ORDER — PANTOPRAZOLE SODIUM 20 MG/1
40 TABLET, DELAYED RELEASE ORAL
Refills: 0 | Status: DISCONTINUED | OUTPATIENT
Start: 2019-10-02 | End: 2019-10-08

## 2019-10-02 RX ORDER — CEFAZOLIN SODIUM 1 G
2000 VIAL (EA) INJECTION EVERY 8 HOURS
Refills: 0 | Status: COMPLETED | OUTPATIENT
Start: 2019-10-02 | End: 2019-10-03

## 2019-10-02 RX ORDER — RISPERIDONE 4 MG/1
1 TABLET ORAL
Qty: 0 | Refills: 0 | DISCHARGE

## 2019-10-02 RX ORDER — DONEPEZIL HYDROCHLORIDE 10 MG/1
5 TABLET, FILM COATED ORAL AT BEDTIME
Refills: 0 | Status: DISCONTINUED | OUTPATIENT
Start: 2019-10-02 | End: 2019-10-02

## 2019-10-02 RX ORDER — CIPROFLOXACIN HCL 0.3 %
1 DROPS OPHTHALMIC (EYE) EVERY 6 HOURS
Refills: 0 | Status: DISCONTINUED | OUTPATIENT
Start: 2019-10-02 | End: 2019-10-08

## 2019-10-02 RX ORDER — FINASTERIDE 5 MG/1
5 TABLET, FILM COATED ORAL DAILY
Refills: 0 | Status: DISCONTINUED | OUTPATIENT
Start: 2019-10-02 | End: 2019-10-02

## 2019-10-02 RX ORDER — DOCUSATE SODIUM 100 MG
1 CAPSULE ORAL
Qty: 0 | Refills: 0 | DISCHARGE

## 2019-10-02 RX ORDER — DOCUSATE SODIUM 100 MG
100 CAPSULE ORAL THREE TIMES A DAY
Refills: 0 | Status: DISCONTINUED | OUTPATIENT
Start: 2019-10-02 | End: 2019-10-02

## 2019-10-02 RX ORDER — DOCUSATE SODIUM 100 MG
100 CAPSULE ORAL THREE TIMES A DAY
Refills: 0 | Status: DISCONTINUED | OUTPATIENT
Start: 2019-10-02 | End: 2019-10-08

## 2019-10-02 RX ORDER — ACETAMINOPHEN 500 MG
975 TABLET ORAL ONCE
Refills: 0 | Status: DISCONTINUED | OUTPATIENT
Start: 2019-10-02 | End: 2019-10-02

## 2019-10-02 RX ORDER — POLYETHYLENE GLYCOL 3350 17 G/17G
17 POWDER, FOR SOLUTION ORAL DAILY
Refills: 0 | Status: DISCONTINUED | OUTPATIENT
Start: 2019-10-02 | End: 2019-10-08

## 2019-10-02 RX ORDER — DONEPEZIL HYDROCHLORIDE 10 MG/1
5 TABLET, FILM COATED ORAL AT BEDTIME
Refills: 0 | Status: DISCONTINUED | OUTPATIENT
Start: 2019-10-02 | End: 2019-10-08

## 2019-10-02 RX ORDER — SODIUM CHLORIDE 9 MG/ML
1000 INJECTION, SOLUTION INTRAVENOUS
Refills: 0 | Status: DISCONTINUED | OUTPATIENT
Start: 2019-10-02 | End: 2019-10-03

## 2019-10-02 RX ORDER — ACETAMINOPHEN 500 MG
650 TABLET ORAL EVERY 6 HOURS
Refills: 0 | Status: COMPLETED | OUTPATIENT
Start: 2019-10-02 | End: 2019-10-05

## 2019-10-02 RX ORDER — RAMIPRIL 5 MG
1 CAPSULE ORAL
Qty: 0 | Refills: 0 | DISCHARGE

## 2019-10-02 RX ORDER — CIPROFLOXACIN HCL 0.3 %
1 DROPS OPHTHALMIC (EYE) EVERY 6 HOURS
Refills: 0 | Status: DISCONTINUED | OUTPATIENT
Start: 2019-10-02 | End: 2019-10-02

## 2019-10-02 RX ORDER — FINASTERIDE 5 MG/1
5 TABLET, FILM COATED ORAL DAILY
Refills: 0 | Status: DISCONTINUED | OUTPATIENT
Start: 2019-10-02 | End: 2019-10-08

## 2019-10-02 RX ORDER — ERYTHROMYCIN BASE 5 MG/GRAM
1 OINTMENT (GRAM) OPHTHALMIC (EYE)
Refills: 0 | Status: DISCONTINUED | OUTPATIENT
Start: 2019-10-02 | End: 2019-10-02

## 2019-10-02 RX ORDER — MAGNESIUM HYDROXIDE 400 MG/1
30 TABLET, CHEWABLE ORAL
Refills: 0 | Status: DISCONTINUED | OUTPATIENT
Start: 2019-10-02 | End: 2019-10-05

## 2019-10-02 RX ORDER — SENNA PLUS 8.6 MG/1
2 TABLET ORAL AT BEDTIME
Refills: 0 | Status: DISCONTINUED | OUTPATIENT
Start: 2019-10-02 | End: 2019-10-08

## 2019-10-02 RX ORDER — OXYCODONE HYDROCHLORIDE 5 MG/1
10 TABLET ORAL EVERY 4 HOURS
Refills: 0 | Status: DISCONTINUED | OUTPATIENT
Start: 2019-10-02 | End: 2019-10-08

## 2019-10-02 RX ORDER — METOPROLOL TARTRATE 50 MG
25 TABLET ORAL DAILY
Refills: 0 | Status: DISCONTINUED | OUTPATIENT
Start: 2019-10-02 | End: 2019-10-02

## 2019-10-02 RX ORDER — TAMSULOSIN HYDROCHLORIDE 0.4 MG/1
0.4 CAPSULE ORAL AT BEDTIME
Refills: 0 | Status: DISCONTINUED | OUTPATIENT
Start: 2019-10-02 | End: 2019-10-02

## 2019-10-02 RX ORDER — METOPROLOL TARTRATE 50 MG
25 TABLET ORAL DAILY
Refills: 0 | Status: DISCONTINUED | OUTPATIENT
Start: 2019-10-02 | End: 2019-10-08

## 2019-10-02 RX ORDER — TAMSULOSIN HYDROCHLORIDE 0.4 MG/1
0.4 CAPSULE ORAL AT BEDTIME
Refills: 0 | Status: DISCONTINUED | OUTPATIENT
Start: 2019-10-02 | End: 2019-10-08

## 2019-10-02 RX ORDER — ONDANSETRON 8 MG/1
4 TABLET, FILM COATED ORAL EVERY 6 HOURS
Refills: 0 | Status: DISCONTINUED | OUTPATIENT
Start: 2019-10-02 | End: 2019-10-08

## 2019-10-02 RX ORDER — ENOXAPARIN SODIUM 100 MG/ML
40 INJECTION SUBCUTANEOUS EVERY 24 HOURS
Refills: 0 | Status: DISCONTINUED | OUTPATIENT
Start: 2019-10-03 | End: 2019-10-04

## 2019-10-02 RX ORDER — HYDROMORPHONE HYDROCHLORIDE 2 MG/ML
0.5 INJECTION INTRAMUSCULAR; INTRAVENOUS; SUBCUTANEOUS
Refills: 0 | Status: DISCONTINUED | OUTPATIENT
Start: 2019-10-02 | End: 2019-10-08

## 2019-10-02 RX ORDER — FINASTERIDE 5 MG/1
1 TABLET, FILM COATED ORAL
Qty: 0 | Refills: 0 | DISCHARGE

## 2019-10-02 RX ORDER — ERYTHROMYCIN BASE 5 MG/GRAM
1 OINTMENT (GRAM) OPHTHALMIC (EYE)
Refills: 0 | Status: DISCONTINUED | OUTPATIENT
Start: 2019-10-02 | End: 2019-10-08

## 2019-10-02 RX ORDER — OXYCODONE HYDROCHLORIDE 5 MG/1
5 TABLET ORAL EVERY 4 HOURS
Refills: 0 | Status: DISCONTINUED | OUTPATIENT
Start: 2019-10-02 | End: 2019-10-08

## 2019-10-02 RX ADMIN — Medication 1 DROP(S): at 05:47

## 2019-10-02 RX ADMIN — SODIUM CHLORIDE 80 MILLILITER(S): 9 INJECTION, SOLUTION INTRAVENOUS at 05:33

## 2019-10-02 RX ADMIN — Medication 650 MILLIGRAM(S): at 12:33

## 2019-10-02 RX ADMIN — Medication 650 MILLIGRAM(S): at 00:00

## 2019-10-02 RX ADMIN — FINASTERIDE 5 MILLIGRAM(S): 5 TABLET, FILM COATED ORAL at 12:32

## 2019-10-02 RX ADMIN — Medication 1 APPLICATION(S): at 05:47

## 2019-10-02 RX ADMIN — Medication 1 DROP(S): at 12:33

## 2019-10-02 RX ADMIN — Medication 100 MILLIGRAM(S): at 12:32

## 2019-10-02 RX ADMIN — Medication 25 MILLIGRAM(S): at 05:46

## 2019-10-02 NOTE — ED ADULT NURSE REASSESSMENT NOTE - NS ED NURSE REASSESS COMMENT FT1
Aletha at bedside for interpretation. Pt denies any pain at this time. Pt offered a urinal at this time. Pt reeducated to stay in bed and NPO.

## 2019-10-02 NOTE — PROGRESS NOTE ADULT - ASSESSMENT
89M pmh stage I diastolic hf, dementia, HTN, BPH presents after being found down, found to have hip fracture. Patient lives in [facility] and was seen for conjunctivitis. Pt discharged from ED back to group home. Pt is very poor historian despite use of ED . Unclear what happened after discharge, but was found down on ground by EMS. Pt only able to stay was walking across street when fell on right side and was unable to get up. Unable to offer any preceding events.     1)Right Femoral neck fracture presumably 2/2 mechanical fall  Per Ortho plan for OR today  will keep patient NPO  pt refusing EKG, no known hx acs, not complaining of chest pain, sob, dizziness, unlikely to change plan.  no medical contraindications to surgery, benefits of surgery would outweigh risks.    2) Bilateral Conjunctivitis, bacterial  will c/w cipro gtt and erythromycin ointment  consider optho consult if no improvement.     3) Leukocytosis likely 2/2 conjunctivitis & hip fracture  monitor level    4) JESSE  likely 2/2 dehydration  gentle hydration  continue to trend.     5) Hypertension  c/w home antihypertensive of metoprolol.     6) Chronic Diastolic CHF  does no currently appear fluid overloaded.  hold lasix in setting of jesse.    7) Dementia  on fall precautions  currently no agitated and redirectable.     8) BPH  on finasteride and flomax

## 2019-10-02 NOTE — BRIEF OPERATIVE NOTE - COMMENTS
post-op plan: WBAT, posterior hip precautions, PT/OT, ancef per SCIP, LMWH or equivalent x 4 weeks, f/u ortho 10/24

## 2019-10-02 NOTE — PROGRESS NOTE ADULT - SUBJECTIVE AND OBJECTIVE BOX
JANAY COLLIER    [id number]    89y      Male    CC: right hip fracutre    INTERVAL HPI/OVERNIGHT EVENTS:  89M pmh stage I diastolic hf, dementia, HTN, BPH presents after being found down, found to have hip fracture. Patient lives in [facility] and was seen for conjunctivitis. Pt discharged from ED back to group home. Pt is very poor historian despite use of ED . Unclear what happened after discharge, but was found down on ground by EMS. Pt only able to stay was walking across street when fell on right side and was unable to get up. Unable to offer any preceding events.     Pt found to have a right hip fracture.    Pt is a poor historian, Pt denies any pain at this time    REVIEW OF SYSTEMS:    CONSTITUTIONAL: No fever or fatigue  RESPIRATORY: No cough, wheezing, No shortness of breath  CARDIOVASCULAR: No chest pain, palpitations  GASTROINTESTINAL: No abdominal or epigastric pain. No nausea, vomiting      Vital Signs Last 24 Hrs  T(C): 36.7 (02 Oct 2019 11:32), Max: 36.7 (01 Oct 2019 14:34)  T(F): 98.1 (02 Oct 2019 11:32), Max: 98.1 (02 Oct 2019 11:32)  HR: 85 (02 Oct 2019 11:32) (76 - 86)  BP: 131/74 (02 Oct 2019 11:32) (122/66 - 153/84)  BP(mean): --  RR: 18 (02 Oct 2019 11:32) (18 - 20)  SpO2: 93% (02 Oct 2019 11:32) (91% - 98%)    PHYSICAL EXAM:    GENERAL: NAD, well-groomed  HEENT: PERRL, +EOMI, positive b/l discharge from orbits   CHEST/LUNG: Clear to auscultation bilaterally; No wheezing  HEART: S1S2+, Regular rate and rhythm; No murmurs  ABDOMEN: Soft, Nontender, Nondistended; Bowel sounds present  EXTREMITIES:  2+ Peripheral Pulses, No clubbing, cyanosis, or edema      LABS:                        14.9   13.62 )-----------( 211      ( 01 Oct 2019 21:57 )             44.1     10-01    143  |  104  |  24.0<H>  ----------------------------<  149<H>  4.3   |  24.0  |  1.81<H>    Ca    9.4      01 Oct 2019 21:57    TPro  7.8  /  Alb  4.2  /  TBili  0.9  /  DBili  x   /  AST  32  /  ALT  15  /  AlkPhos  108  10-01    PT/INR - ( 02 Oct 2019 02:57 )   PT: 12.6 sec;   INR: 1.09 ratio         PTT - ( 02 Oct 2019 02:57 )  PTT:31.1 sec        MEDICATIONS  (STANDING):  acetaminophen   Tablet .. 650 milliGRAM(s) Oral every 6 hours  acetaminophen   Tablet .. 975 milliGRAM(s) Oral once  artificial  tears Solution 1 Drop(s) Both EYES two times a day  ceFAZolin   IVPB 2000 milliGRAM(s) IV Intermittent once  ciprofloxacin  0.3% Ophthalmic Solution 1 Drop(s) Both EYES every 6 hours  docusate sodium 100 milliGRAM(s) Oral three times a day  donepezil 5 milliGRAM(s) Oral at bedtime  erythromycin   Ointment 1 Application(s) Both EYES four times a day  finasteride 5 milliGRAM(s) Oral daily  lactated ringers. 1000 milliLiter(s) (80 mL/Hr) IV Continuous <Continuous>  metoprolol succinate ER 25 milliGRAM(s) Oral daily  sodium chloride 0.9% lock flush 3 milliLiter(s) IV Push once  tamsulosin 0.4 milliGRAM(s) Oral at bedtime  tranexamic acid IVPB 1000 milliGRAM(s) IV Intermittent once  tranexamic acid IVPB 1000 milliGRAM(s) IV Intermittent once    MEDICATIONS  (PRN):  oxyCODONE    IR 5 milliGRAM(s) Oral every 4 hours PRN Moderate Pain (4 - 6)  oxyCODONE    IR 10 milliGRAM(s) Oral every 4 hours PRN Severe Pain (7 - 10)

## 2019-10-02 NOTE — ED ADULT NURSE REASSESSMENT NOTE - NS ED NURSE REASSESS COMMENT FT1
Pt refusing EKG and gown at this time. MD MONTANEZ made aware. pt handed off to RN PG in stable condition.

## 2019-10-03 LAB
ANION GAP SERPL CALC-SCNC: 15 MMOL/L — SIGNIFICANT CHANGE UP (ref 5–17)
APPEARANCE UR: CLEAR — SIGNIFICANT CHANGE UP
BACTERIA # UR AUTO: ABNORMAL
BILIRUB UR-MCNC: NEGATIVE — SIGNIFICANT CHANGE UP
BUN SERPL-MCNC: 42 MG/DL — HIGH (ref 8–20)
CALCIUM SERPL-MCNC: 8 MG/DL — LOW (ref 8.6–10.2)
CHLORIDE SERPL-SCNC: 105 MMOL/L — SIGNIFICANT CHANGE UP (ref 98–107)
CO2 SERPL-SCNC: 22 MMOL/L — SIGNIFICANT CHANGE UP (ref 22–29)
COLOR SPEC: YELLOW — SIGNIFICANT CHANGE UP
CREAT SERPL-MCNC: 2.1 MG/DL — HIGH (ref 0.5–1.3)
DIFF PNL FLD: ABNORMAL
EPI CELLS # UR: SIGNIFICANT CHANGE UP
GLUCOSE BLDC GLUCOMTR-MCNC: 168 MG/DL — HIGH (ref 70–99)
GLUCOSE SERPL-MCNC: 140 MG/DL — HIGH (ref 70–115)
GLUCOSE UR QL: 50 MG/DL
HCT VFR BLD CALC: 39.6 % — SIGNIFICANT CHANGE UP (ref 39–50)
HGB BLD-MCNC: 12.7 G/DL — LOW (ref 13–17)
KETONES UR-MCNC: ABNORMAL
LEUKOCYTE ESTERASE UR-ACNC: NEGATIVE — SIGNIFICANT CHANGE UP
MAGNESIUM SERPL-MCNC: 2 MG/DL — SIGNIFICANT CHANGE UP (ref 1.6–2.6)
MCHC RBC-ENTMCNC: 32.1 GM/DL — SIGNIFICANT CHANGE UP (ref 32–36)
MCHC RBC-ENTMCNC: 34.4 PG — HIGH (ref 27–34)
MCV RBC AUTO: 107.3 FL — HIGH (ref 80–100)
NITRITE UR-MCNC: NEGATIVE — SIGNIFICANT CHANGE UP
OSMOLALITY UR: 605 MOSM/KG — SIGNIFICANT CHANGE UP (ref 300–1000)
PH UR: 5 — SIGNIFICANT CHANGE UP (ref 5–8)
PLATELET # BLD AUTO: 185 K/UL — SIGNIFICANT CHANGE UP (ref 150–400)
POTASSIUM SERPL-MCNC: 4.3 MMOL/L — SIGNIFICANT CHANGE UP (ref 3.5–5.3)
POTASSIUM SERPL-SCNC: 4.3 MMOL/L — SIGNIFICANT CHANGE UP (ref 3.5–5.3)
POTASSIUM UR-SCNC: 70 MMOL/L — SIGNIFICANT CHANGE UP
PROT UR-MCNC: 30 MG/DL
RBC # BLD: 3.69 M/UL — LOW (ref 4.2–5.8)
RBC # FLD: 12.3 % — SIGNIFICANT CHANGE UP (ref 10.3–14.5)
RBC CASTS # UR COMP ASSIST: ABNORMAL /HPF (ref 0–4)
SODIUM SERPL-SCNC: 142 MMOL/L — SIGNIFICANT CHANGE UP (ref 135–145)
SODIUM UR-SCNC: <30 MMOL/L — SIGNIFICANT CHANGE UP
SP GR SPEC: 1.02 — SIGNIFICANT CHANGE UP (ref 1.01–1.02)
UROBILINOGEN FLD QL: NEGATIVE MG/DL — SIGNIFICANT CHANGE UP
WBC # BLD: 9.85 K/UL — SIGNIFICANT CHANGE UP (ref 3.8–10.5)
WBC # FLD AUTO: 9.85 K/UL — SIGNIFICANT CHANGE UP (ref 3.8–10.5)
WBC UR QL: SIGNIFICANT CHANGE UP

## 2019-10-03 PROCEDURE — 99223 1ST HOSP IP/OBS HIGH 75: CPT

## 2019-10-03 PROCEDURE — 99232 SBSQ HOSP IP/OBS MODERATE 35: CPT

## 2019-10-03 RX ORDER — SODIUM CHLORIDE 9 MG/ML
1000 INJECTION INTRAMUSCULAR; INTRAVENOUS; SUBCUTANEOUS
Refills: 0 | Status: DISCONTINUED | OUTPATIENT
Start: 2019-10-03 | End: 2019-10-04

## 2019-10-03 RX ORDER — SACCHAROMYCES BOULARDII 250 MG
250 POWDER IN PACKET (EA) ORAL
Refills: 0 | Status: DISCONTINUED | OUTPATIENT
Start: 2019-10-03 | End: 2019-10-03

## 2019-10-03 RX ADMIN — Medication 650 MILLIGRAM(S): at 00:44

## 2019-10-03 RX ADMIN — Medication 1 TABLET(S): at 12:03

## 2019-10-03 RX ADMIN — Medication 1 DROP(S): at 00:44

## 2019-10-03 RX ADMIN — Medication 650 MILLIGRAM(S): at 12:05

## 2019-10-03 RX ADMIN — SENNA PLUS 2 TABLET(S): 8.6 TABLET ORAL at 22:54

## 2019-10-03 RX ADMIN — DONEPEZIL HYDROCHLORIDE 5 MILLIGRAM(S): 10 TABLET, FILM COATED ORAL at 22:56

## 2019-10-03 RX ADMIN — Medication 1 APPLICATION(S): at 05:44

## 2019-10-03 RX ADMIN — Medication 1 DROP(S): at 12:04

## 2019-10-03 RX ADMIN — Medication 1 TABLET(S): at 12:04

## 2019-10-03 RX ADMIN — Medication 1 DROP(S): at 05:43

## 2019-10-03 RX ADMIN — Medication 1 DROP(S): at 22:56

## 2019-10-03 RX ADMIN — Medication 650 MILLIGRAM(S): at 17:32

## 2019-10-03 RX ADMIN — Medication 1 APPLICATION(S): at 12:07

## 2019-10-03 RX ADMIN — Medication 1 DROP(S): at 17:32

## 2019-10-03 RX ADMIN — Medication 650 MILLIGRAM(S): at 22:55

## 2019-10-03 RX ADMIN — ENOXAPARIN SODIUM 40 MILLIGRAM(S): 100 INJECTION SUBCUTANEOUS at 05:42

## 2019-10-03 RX ADMIN — Medication 1 TABLET(S): at 22:55

## 2019-10-03 RX ADMIN — Medication 650 MILLIGRAM(S): at 13:05

## 2019-10-03 RX ADMIN — Medication 100 MILLIGRAM(S): at 10:01

## 2019-10-03 RX ADMIN — Medication 100 MILLIGRAM(S): at 22:56

## 2019-10-03 RX ADMIN — SODIUM CHLORIDE 75 MILLILITER(S): 9 INJECTION, SOLUTION INTRAVENOUS at 00:43

## 2019-10-03 RX ADMIN — POLYETHYLENE GLYCOL 3350 17 GRAM(S): 17 POWDER, FOR SOLUTION ORAL at 12:03

## 2019-10-03 RX ADMIN — TAMSULOSIN HYDROCHLORIDE 0.4 MILLIGRAM(S): 0.4 CAPSULE ORAL at 22:56

## 2019-10-03 RX ADMIN — Medication 1 APPLICATION(S): at 22:57

## 2019-10-03 RX ADMIN — FINASTERIDE 5 MILLIGRAM(S): 5 TABLET, FILM COATED ORAL at 12:04

## 2019-10-03 RX ADMIN — Medication 100 MILLIGRAM(S): at 12:04

## 2019-10-03 RX ADMIN — Medication 1 DROP(S): at 05:44

## 2019-10-03 RX ADMIN — Medication 1 APPLICATION(S): at 17:33

## 2019-10-03 RX ADMIN — Medication 1 DROP(S): at 17:33

## 2019-10-03 RX ADMIN — Medication 650 MILLIGRAM(S): at 22:57

## 2019-10-03 RX ADMIN — Medication 100 MILLIGRAM(S): at 00:44

## 2019-10-03 RX ADMIN — SODIUM CHLORIDE 75 MILLILITER(S): 9 INJECTION INTRAMUSCULAR; INTRAVENOUS; SUBCUTANEOUS at 17:32

## 2019-10-03 RX ADMIN — Medication 1 APPLICATION(S): at 00:43

## 2019-10-03 NOTE — PHYSICAL THERAPY INITIAL EVALUATION ADULT - GROSSLY INTACT, SENSORY
specific testing limited by cognition/command following/Left UE/Right UE/Right LE/Grossly Intact/Left LE

## 2019-10-03 NOTE — PROGRESS NOTE ADULT - ASSESSMENT
89M pmh stage I diastolic hf, dementia, HTN, BPH presents after being found down, found to have hip fracture. Patient lives in [facility] and was seen for conjunctivitis. Pt discharged from ED back to group home. Pt is very poor historian despite use of ED . Unclear what happened after discharge, but was found down on ground by EMS. Pt only able to stay was walking across street when fell on right side and was unable to get up. Unable to offer any preceding events.     1)Right Femoral neck fracture presumably 2/2 mechanical fall  s/p ORIF 10/2/19  ortho following  PT rec [facility]    2) Bilateral Conjunctivitis, bacterial  will c/w cipro gtt and erythromycin ointment    3) Leukocytosis likely 2/2 conjunctivitis & hip fracture: resolved    4) JESSE vs CKD?  likely 2/2 dehydration  gentle hydration  continue to trend  renal ultrasound ordered  renal consulted    5) Hypertension  c/w home antihypertensive of metoprolol.     6) Chronic Diastolic CHF  does no currently appear fluid overloaded.  hold lasix in setting of jesse.    7) Dementia  on fall precautions  currently no agitated and redirectable.     8) BPH  on finasteride and flomax    Dispo: [facility] likely in the next 24 hours

## 2019-10-03 NOTE — DISCHARGE NOTE PROVIDER - CARE PROVIDER_API CALL
Al Diane)  Orthopaedic Surgery  [address] [place] [address]  Phone: [phone]  Fax: [phone]  Follow Up Time:

## 2019-10-03 NOTE — DISCHARGE NOTE PROVIDER - NSDCFUADDINST_GEN_ALL_CORE_FT
Ortho d/c instructions: The patient will be seen in the office on 10/24 for wound check. Sutures/Staples/Tape will be removed at that time. Patient may shower after post-op day #5. The patient will contact the office if the wound becomes red, has increasing pain, develops bleeding or discharge, an injury occurs, or has other concerns. The patient will continue PT consistent with posterior total hip replacement protocol. The patient will continue to practice posterior total hip precautions for a minimum of 6 week. The patient will continue LOVENOX for 4 weeks. The patient is FULL weight bearing.

## 2019-10-03 NOTE — PHYSICAL THERAPY INITIAL EVALUATION ADULT - CRITERIA FOR SKILLED THERAPEUTIC INTERVENTIONS
anticipated equipment needs at discharge/risk reduction/prevention/anticipated discharge recommendation/functional limitations in following categories/therapy frequency/rehab potential/impairments found

## 2019-10-03 NOTE — PHYSICAL THERAPY INITIAL EVALUATION ADULT - PASSIVE RANGE OF MOTION EXAMINATION, REHAB EVAL
bilateral upper extremity Passive ROM was WFL (within functional limits)/right hip flex to 90 degrees in sitting, knee flex WFL, ankle df WFL/Left LE Passive ROM was WFL (within functional limits)

## 2019-10-03 NOTE — CONSULT NOTE ADULT - ASSESSMENT
89M pmh stage I diastolic hf, dementia, HTN, BPH presents after being found down, found to have hip fracture. Patient lives in [facility] home and was seen for conjunctivitis. Pt discharged from ED back to group home. Pt is very poor historian despite use of ED . Unclear what happened after discharge, but was found down on ground by EMS. Pt only able to stay was walking across street when fell on right side and was unable to get up. Unable to offer any preceding events.     1) Right Femoral neck fracture,     s/p ORIF 10/2/19    2) JESSE , Superimposed on  CKD ( Stage ? )     on IVF ,     US Kidneys & Bladder - R.O Obstruction,      3) Chronic Diastolic CHF ( Lasix Held )     4) BPH  on finasteride and Flomax

## 2019-10-03 NOTE — PHYSICAL THERAPY INITIAL EVALUATION ADULT - PERTINENT HX OF CURRENT PROBLEM, REHAB EVAL
89M pmh stage I diastolic hf, dementia, HTN, BPH presents after being found down, found to have hip fracture. Patient lives in [facility] and was seen earlier today for conjunctivitis. Pt discharged from ED back to group home. Pt is very poor historian despite use of ED . Unclear what happened after discharge, but was found down on ground by EMS. now s/o right hip hemiarthroplasty for femoral neck fx.

## 2019-10-03 NOTE — PHYSICAL THERAPY INITIAL EVALUATION ADULT - ACTIVE RANGE OF MOTION EXAMINATION, REHAB EVAL
bilateral upper extremity Active ROM was WFL (within functional limits)/right hip flex ~30 degrees, knee flex WFL, ankle df WFL/Left LE Active ROM was WFL (within functional limits)

## 2019-10-03 NOTE — PHYSICAL THERAPY INITIAL EVALUATION ADULT - ADDITIONAL COMMENTS
pt poor historian. did not answer any questions about living situation. per chart, pt lives in an adult home and was independent without devices. states he attended a day program. no further information available about stairs, DME, supervision/assist available to pt.

## 2019-10-03 NOTE — DISCHARGE NOTE PROVIDER - NSDCCPCAREPLAN_GEN_ALL_CORE_FT
PRINCIPAL DISCHARGE DIAGNOSIS  Diagnosis: Hip fracture  Assessment and Plan of Treatment: Patient underwent ORIF 10/2/19      SECONDARY DISCHARGE DIAGNOSES  Diagnosis: Chronic CHF  Assessment and Plan of Treatment: Continue Home Meds    Diagnosis: Bilateral conjunctivitis  Assessment and Plan of Treatment: Continue Home Meds.    Diagnosis: JESSE (acute kidney injury)  Assessment and Plan of Treatment: Improved    Diagnosis: BPH (benign prostatic hyperplasia)  Assessment and Plan of Treatment: Continue Home Meds.    Diagnosis: Hypertension  Assessment and Plan of Treatment: Continue Home Meds.    Diagnosis: Dementia  Assessment and Plan of Treatment: Continue home meds. PRINCIPAL DISCHARGE DIAGNOSIS  Diagnosis: Hip fracture  Assessment and Plan of Treatment: Patient underwent ORIF 10/2/19      SECONDARY DISCHARGE DIAGNOSES  Diagnosis: Chronic CHF  Assessment and Plan of Treatment: Continue Home Meds    Diagnosis: Bilateral conjunctivitis  Assessment and Plan of Treatment: Continue Home Meds.    Diagnosis: JESSE (acute kidney injury)  Assessment and Plan of Treatment: Resolved prior to discharge    Diagnosis: BPH (benign prostatic hyperplasia)  Assessment and Plan of Treatment: Course complicated with urinary retention. continue tamsulosin. Patient should be reassessed on 10/8 to see if still retaining. On discharge did not require any straight cath.    Diagnosis: Hypertension  Assessment and Plan of Treatment: Continue Home Meds.    Diagnosis: Dementia  Assessment and Plan of Treatment: Continue home meds. PRINCIPAL DISCHARGE DIAGNOSIS  Diagnosis: Hip fracture  Assessment and Plan of Treatment: Patient underwent ORIF 10/2/19      SECONDARY DISCHARGE DIAGNOSES  Diagnosis: Chronic CHF  Assessment and Plan of Treatment: Continue Home Meds    Diagnosis: Bilateral conjunctivitis  Assessment and Plan of Treatment: Continue Home Meds.    Diagnosis: JESSE (acute kidney injury)  Assessment and Plan of Treatment: Resolved prior to discharge    Diagnosis: BPH (benign prostatic hyperplasia)  Assessment and Plan of Treatment: Course complicated with urinary retention, now resolved.  Continue tamsulosin.    Diagnosis: Hypertension  Assessment and Plan of Treatment: Continue Home Meds.    Diagnosis: Dementia  Assessment and Plan of Treatment: Continue home meds.

## 2019-10-03 NOTE — CONSULT NOTE ADULT - SUBJECTIVE AND OBJECTIVE BOX
Patient is a 89y old  Male who presents with a chief complaint of hip fracture (03 Oct 2019 12:30)    HPI:  89M pmh stage I diastolic hf, dementia, HTN, BPH presents after being found down, found to have hip fracture. Patient lives in [facility] and was seen earlier today for conjunctivitis. Pt discharged from ED back to group home. Pt is very poor historian despite use of ED . Unclear what happened after discharge, but was found down on ground by EMS. Pt only able to stay was walking across street when fell on right side and was unable to get up. Unable to offer any preceding events.     In ED, pt was hemodynamically stable. Xray suggestive of R femoral neck fracture. (01 Oct 2019 23:56)    PAST MEDICAL & SURGICAL HISTORY:  Blindness of one eye  CHF (congestive heart failure): stage I diastolic  Dementia  Hypertension    FAMILY HISTORY:  No pertinent family history in first degree relatives    Social History: Non Smoker,     MEDICATIONS  (STANDING):  acetaminophen   Tablet .. 650 milliGRAM(s) Oral every 6 hours  artificial  tears Solution 1 Drop(s) Both EYES two times a day  calcium carbonate 1250 mG  + Vitamin D (OsCal 500 + D) 1 Tablet(s) Oral three times a day  ciprofloxacin  0.3% Ophthalmic Solution 1 Drop(s) Both EYES every 6 hours  docusate sodium 100 milliGRAM(s) Oral three times a day  donepezil 5 milliGRAM(s) Oral at bedtime  enoxaparin Injectable 40 milliGRAM(s) SubCutaneous every 24 hours  erythromycin   Ointment 1 Application(s) Both EYES four times a day  finasteride 5 milliGRAM(s) Oral daily  metoprolol succinate ER 25 milliGRAM(s) Oral daily  morphine  - Injectable 4 milliGRAM(s) IV Push once  multivitamin 1 Tablet(s) Oral daily  pantoprazole    Tablet 40 milliGRAM(s) Oral before breakfast  polyethylene glycol 3350 17 Gram(s) Oral daily  sodium chloride 0.9%. 1000 milliLiter(s) (75 mL/Hr) IV Continuous <Continuous>  tamsulosin 0.4 milliGRAM(s) Oral at bedtime    MEDICATIONS  (PRN):  aluminum hydroxide/magnesium hydroxide/simethicone Suspension 30 milliLiter(s) Oral four times a day PRN Indigestion  HYDROmorphone  Injectable 0.5 milliGRAM(s) IV Push every 3 hours PRN Severe Pain (7 - 10)  magnesium hydroxide Suspension 30 milliLiter(s) Oral two times a day PRN Constipation  ondansetron Injectable 4 milliGRAM(s) IV Push every 6 hours PRN Nausea and/or Vomiting  oxyCODONE    IR 5 milliGRAM(s) Oral every 4 hours PRN Mild Pain (1 - 3)  oxyCODONE    IR 10 milliGRAM(s) Oral every 4 hours PRN Moderate Pain (4 - 6)  senna 2 Tablet(s) Oral at bedtime PRN Constipation    Allergies    No Known Allergies    REVIEW OF SYSTEMS:  Patient is demented, Unable,     Vital Signs Last 24 Hrs  T(C): 37.3 (03 Oct 2019 09:41), Max: 37.3 (03 Oct 2019 09:41)  T(F): 99.1 (03 Oct 2019 09:41), Max: 99.1 (03 Oct 2019 09:41)  HR: 88 (03 Oct 2019 09:41) (62 - 88)  BP: 107/63 (03 Oct 2019 09:41) (93/54 - 116/66)  RR: 18 (03 Oct 2019 09:41) (15 - 21)  SpO2: 92% (03 Oct 2019 09:41) (92% - 99%)    PHYSICAL EXAM:    GENERAL: NAD, Pleasantly confused,   HEAD:  Atraumatic, Normocephalic  EYES: EOMI, conjunctiva injected,   ENMT: Moist mucous membranes,   NECK: Supple, No JVD,   NERVOUS SYSTEM:  Awake, confused,   CHEST/LUNG: Clear to percussion bilaterally; No rales, rhonchi, wheezing, or rubs  HEART: Regular rate and rhythm; No murmurs, rubs, or gallops  ABDOMEN: Soft, Nontender, Nondistended; Bowel sounds present  EXTREMITIES:  2+ Peripheral Pulses, No clubbing, cyanosis, or edema  LYMPH: No lymphadenopathy noted  SKIN: No rashes or lesions    LABS:                        12.7   9.85  )-----------( 185      ( 03 Oct 2019 08:10 )             39.6     10-03    142  |  105  |  42.0<H>  ----------------------------<  140<H>  4.3   |  22.0  |  2.10<H>    Ca    8.0<L>      03 Oct 2019 08:10  Mg     2.0     10-03    TPro  7.8  /  Alb  4.2  /  TBili  0.9  /  DBili  x   /  AST  32  /  ALT  15  /  AlkPhos  108  10-01    PT/INR - ( 02 Oct 2019 02:57 )   PT: 12.6 sec;   INR: 1.09 ratio      PTT - ( 02 Oct 2019 02:57 )  PTT:31.1 sec    Magnesium, Serum: 2.0 mg/dL (10-03 @ 08:10)

## 2019-10-03 NOTE — OCCUPATIONAL THERAPY INITIAL EVALUATION ADULT - PLANNED THERAPY INTERVENTIONS, OT EVAL
balance training/neuromuscular re-education/strengthening/ADL retraining/bed mobility training/transfer training

## 2019-10-03 NOTE — PHYSICAL THERAPY INITIAL EVALUATION ADULT - NEUROVASCULAR ASSESSMENT LLE
Addended by: Bridgette Rushing on: 9/28/2018 04:43 PM     Modules accepted: Orders
unable to assess due to cognition/no discoloration/warm

## 2019-10-03 NOTE — DISCHARGE NOTE PROVIDER - HOSPITAL COURSE
The pt is a 89y/o M with PMHx of stage I diastolic HF, dementia (poor historian), HTN, BPH who presented after being found by EMS s/p unwitnessed fall. On arrival to the ED he was found to have a right femoral neck fracture presumably 2/2 mechanical fall. Patient underwent ORIF 10/2/19 and has been followed by orthopedics and PT during his stay. Leukocytosis likely 2/2 hip fracture has resolved. Patient will be discharged to [facility] as per PT recommendation.     The pt was also treated with cipro gtt and erythromycin ointment during his stay for bilateral conjunctivitis. In addition, he was found to have an JESSE 2/2 likely dehydration which was treated with gentle hydration. Renal has been following and labs have been trended.        Vital Signs Last 24 Hrs    T(C): 37.3 (04 Oct 2019 07:47), Max: 37.3 (03 Oct 2019 09:41)    T(F): 99.1 (04 Oct 2019 07:47), Max: 99.1 (03 Oct 2019 09:41)    HR: 82 (04 Oct 2019 07:47) (76 - 88)    BP: 118/54 (04 Oct 2019 07:47) (100/70 - 118/54)    BP(mean): --    RR: 19 (04 Oct 2019 07:47) (18 - 19)    SpO2: 99% (03 Oct 2019 23:55) (92% - 99%) The pt is a 89y/o M with PMHx of stage I diastolic HF, dementia (poor historian), HTN, BPH who presented after being found by EMS s/p unwitnessed fall. On arrival to the ED he was found to have a right femoral neck fracture presumably 2/2 mechanical fall. Patient underwent ORIF 10/2/19 and has been followed by orthopedics and PT during his stay. Weight bearing as tolerated. Posterior hip precautions, ancef per SCIP, LMWH or equivalent x 4 weeks, f/u ortho 10/24. Leukocytosis likely 2/2 hip fracture has resolved. Patient will be discharged to [facility] as per PT recommendation.     The pt was also treated with cipro gtt and erythromycin ointment during his stay for bilateral conjunctivitis, which has improved as per patient and he is no longer contagious. In addition, he was found to have an JESSE 2/2 likely dehydration which was treated with gentle hydration. Renal has been following and labs have been trended. Continue to follow at [facility]. US Kidney and bladder ordered 10/4/19 to rule out obstruction. Findings are as followed:     IMPRESSION:         No hydronephrosis.        Enlarged prostate with intravesicular protrusion.        Trabeculated bladder wall versus small bladder diverticula.            Spoke to patient using video  services [employer] #[id number]. Patient understands and agrees to plan.         Vital Signs Last 24 Hrs    T(C): 37.3 (04 Oct 2019 07:47), Max: 37.3 (03 Oct 2019 09:41)    T(F): 99.1 (04 Oct 2019 07:47), Max: 99.1 (03 Oct 2019 09:41)    HR: 82 (04 Oct 2019 07:47) (76 - 88)    BP: 118/54 (04 Oct 2019 07:47) (100/70 - 118/54)    BP(mean): --    RR: 19 (04 Oct 2019 07:47) (18 - 19)    SpO2: 99% (03 Oct 2019 23:55) (92% - 99%)        PHYSICAL EXAM:        GENERAL: NAD, well-groomed, pleasant    HEENT: +EOMI, positive b/l discharge from orbits     CHEST/LUNG: Clear to auscultation bilaterally; No wheezing    HEART: S1S2+, Regular rate and rhythm; No murmurs    ABDOMEN: Soft, Nontender, Nondistended; Bowel sounds present    EXTREMITIES:  Dressing to right hip clean, dry and intact. Scant b/l pedal edema, 2+ Peripheral Pulses, No clubbing, cyanosis.            All electrolyte abnormalities were monitored carefully and repleted as necessary during this hospitalization. At the time of discharge patient was hemodynamically stable and amenable to all terms of discharge. The patient has received verbal instructions from myself regarding discharge plans.         Length of Discharge: 45MIN The pt is a 89y/o M with PMHx of stage I diastolic HF, dementia (poor historian), HTN, BPH who presented after being found by EMS s/p unwitnessed fall. On arrival to the ED he was found to have a right femoral neck fracture presumably 2/2 mechanical fall. Patient underwent ORIF 10/2/19 and has been followed by orthopedics and PT during his stay. Weight bearing as tolerated. Posterior hip precautions, ancef per SCIP, LMWH or equivalent x 4 weeks, f/u ortho 10/24. Leukocytosis likely 2/2 hip fracture has resolved. Patient will be discharged to [facility] [place] as per PT recommendation.         The pt was also treated with cipro gtt and erythromycin ointment during his stay for bilateral conjunctivitis, which has improved as per patient and he is no longer contagious. In addition, he was found to have an JESSE 2/2 likely dehydration which was treated with gentle hydration. Renal function resolved back to baseline.        ICU Vital Signs Last 24 Hrs    T(C): 37 (07 Oct 2019 01:09), Max: 37 (07 Oct 2019 01:09)    T(F): 98.6 (07 Oct 2019 01:09), Max: 98.6 (07 Oct 2019 01:09)    HR: 76 (07 Oct 2019 04:50) (70 - 76)    BP: 123/67 (07 Oct 2019 04:50) (109/48 - 123/67)    BP(mean): --    ABP: --    ABP(mean): --    RR: 18 (07 Oct 2019 01:09) (18 - 18)    SpO2: 92% (07 Oct 2019 01:09) (90% - 92%)        PHYSICAL EXAM:    General: in no acute distress    Eyes: PERRLA, EOMI; conjunctiva slightly erythematous but otherwise no purulence    Head: Normocephalic; atraumatic    ENMT: No nasal discharge; airway clear    Neck: Supple; non tender; no masses    Respiratory: No wheezes, rales or rhonchi    Cardiovascular: Regular rate and rhythm. S1 and S2 Normal; No murmurs, gallops or rubs    Gastrointestinal: Soft non-tender non-distended; Normal bowel sounds    Extremities: Normal range of motion, No clubbing, cyanosis or edema    Vascular: Peripheral pulses palpable 2+ bilaterally    Skin: Warm and dry. No acute rash    Psychiatric: Cooperative and appropriate        discharge time 40 minutes The pt is a 89y/o M with PMHx of stage I diastolic HF, dementia (poor historian), HTN, BPH who presented after being found by EMS s/p unwitnessed fall. On arrival to the ED he was found to have a right femoral neck fracture presumably 2/2 mechanical fall. Patient underwent ORIF 10/2/19 and has been followed by orthopedics and PT during his stay. Weight bearing as tolerated. Posterior hip precautions, ancef per SCIP, LMWH or equivalent x 4 weeks, f/u ortho 10/24. Leukocytosis likely 2/2 hip fracture has resolved. Patient will be discharged to [facility] as per PT recommendation. The pt was also treated with cipro gtt and erythromycin ointment during his stay for bilateral conjunctivitis, which has improved as per patient and he is no longer contagious. In addition, he was found to have an JESSE 2/2 likely dehydration which was treated with gentle hydration. Renal function resolved back to baseline.        Vital Signs Last 24 Hrs    T(C): 36.9 (08 Oct 2019 08:22), Max: 37.2 (07 Oct 2019 23:50)    T(F): 98.4 (08 Oct 2019 08:22), Max: 98.9 (07 Oct 2019 23:50)    HR: 69 (08 Oct 2019 08:22) (69 - 75)    BP: 112/70 (08 Oct 2019 08:22) (112/70 - 118/58)    BP(mean): --    RR: 19 (08 Oct 2019 08:22) (18 - 19)    SpO2: 93% (08 Oct 2019 08:22) (89% - 98%)        PHYSICAL EXAM:    General: in no acute distress    Eyes: PERRLA, EOMI; conjunctiva slightly erythematous but otherwise no purulence    Head: Normocephalic; atraumatic    ENMT: No nasal discharge; airway clear    Neck: Supple; non tender; no masses    Respiratory: No wheezes, rales or rhonchi    Cardiovascular: Regular rate and rhythm. S1 and S2 Normal; No murmurs, gallops or rubs    Gastrointestinal: Soft non-tender non-distended; Normal bowel sounds    Extremities: Normal range of motion, No clubbing, cyanosis or edema    Vascular: Peripheral pulses palpable 2+ bilaterally    Skin: Warm and dry. No acute rash    Psychiatric: Cooperative and appropriate        discharge time 40 minutes The pt is a 89y/o M with PMHx of stage I diastolic HF, dementia (poor historian), HTN, BPH who presented after being found by EMS s/p unwitnessed fall. On arrival to the ED he was found to have a right femoral neck fracture presumably 2/2 mechanical fall. Patient underwent ORIF 10/2/19 and has been followed by orthopedics and PT during his stay. Weight bearing as tolerated. Posterior hip precautions, ancef per SCIP, LMWH or equivalent x 4 weeks, f/u ortho 10/24. Leukocytosis likely 2/2 hip fracture has resolved. Patient will be discharged to [facility] as per PT recommendation. The pt was also treated with cipro gtt and erythromycin ointment during his stay for bilateral conjunctivitis, which has improved as per patient and he is no longer contagious. In addition, he was found to have an JESSE 2/2 likely dehydration which was treated with gentle hydration. Renal function resolved back to baseline.        ICU Vital Signs Last 24 Hrs    T(C): 36.9 (08 Oct 2019 08:22), Max: 37.2 (07 Oct 2019 23:50)    T(F): 98.4 (08 Oct 2019 08:22), Max: 98.9 (07 Oct 2019 23:50)    HR: 69 (08 Oct 2019 08:22) (69 - 75)    BP: 112/70 (08 Oct 2019 08:22) (112/70 - 118/58)    BP(mean): --    ABP: --    ABP(mean): --    RR: 19 (08 Oct 2019 08:22) (18 - 19)    SpO2: 93% (08 Oct 2019 08:22) (89% - 98%)            PHYSICAL EXAM:    General: in no acute distress    Eyes: PERRLA, EOMI; conjunctiva slightly erythematous but otherwise no purulence    Head: Normocephalic; atraumatic    ENMT: No nasal discharge; airway clear    Neck: Supple; non tender; no masses    Respiratory: No wheezes, rales or rhonchi    Cardiovascular: Regular rate and rhythm. S1 and S2 Normal; No murmurs, gallops or rubs    Gastrointestinal: Soft non-tender non-distended; Normal bowel sounds    Extremities: Normal range of motion, No clubbing, cyanosis or edema    Vascular: Peripheral pulses palpable 2+ bilaterally    Skin: Warm and dry. No acute rash    Psychiatric: Cooperative and appropriate        discharge time 40 minutes

## 2019-10-03 NOTE — PHYSICAL THERAPY INITIAL EVALUATION ADULT - MANUAL MUSCLE TESTING RESULTS, REHAB EVAL
testing limited by cognition and command following. grossly bilateral shoulder flex 3+/5, elbow flex 3+/5...left hip flex 3-/5, knee ext 3-/5, ankle df 3+/5...right hip flex 2-/5, knee flex 2/5,knee ext 2-/5, ankle df 3/5

## 2019-10-03 NOTE — PROGRESS NOTE ADULT - SUBJECTIVE AND OBJECTIVE BOX
JANAY COLLIER    [id number]    89y      Male    CC: right hip fracutre    INTERVAL HPI/OVERNIGHT EVENTS:  89M pmh stage I diastolic hf, dementia, HTN, BPH presents after being found down, found to have hip fracture. Patient lives in [facility] and was seen for conjunctivitis. Pt discharged from ED back to group home. Pt is very poor historian despite use of ED . Unclear what happened after discharge, but was found down on ground by EMS. Pt only able to stay was walking across street when fell on right side and was unable to get up. Unable to offer any preceding events.     Pt found to have a right hip fracture.    Pt is a poor historian, Pt denies any pain at this time    REVIEW OF SYSTEMS:    CONSTITUTIONAL: No fever or fatigue  RESPIRATORY: No cough, wheezing, No shortness of breath  CARDIOVASCULAR: No chest pain, palpitations  GASTROINTESTINAL: No abdominal or epigastric pain. No nausea, vomiting          Vital Signs Last 24 Hrs  T(C): 37.3 (03 Oct 2019 09:41), Max: 37.3 (03 Oct 2019 09:41)  T(F): 99.1 (03 Oct 2019 09:41), Max: 99.1 (03 Oct 2019 09:41)  HR: 88 (03 Oct 2019 09:41) (62 - 88)  BP: 107/63 (03 Oct 2019 09:41) (93/54 - 116/66)  BP(mean): --  RR: 18 (03 Oct 2019 09:41) (15 - 21)  SpO2: 92% (03 Oct 2019 09:41) (92% - 99%)    PHYSICAL EXAM:    GENERAL: NAD, well-groomed  HEENT: PERRL, +EOMI, positive b/l discharge from orbits   CHEST/LUNG: Clear to auscultation bilaterally; No wheezing  HEART: S1S2+, Regular rate and rhythm; No murmurs  ABDOMEN: Soft, Nontender, Nondistended; Bowel sounds present  EXTREMITIES:  2+ Peripheral Pulses, No clubbing, cyanosis, or edema      LABS:                        12.7   9.85  )-----------( 185      ( 03 Oct 2019 08:10 )             39.6     10-03    142  |  105  |  42.0<H>  ----------------------------<  140<H>  4.3   |  22.0  |  2.10<H>    Ca    8.0<L>      03 Oct 2019 08:10  Mg     2.0     10-03    TPro  7.8  /  Alb  4.2  /  TBili  0.9  /  DBili  x   /  AST  32  /  ALT  15  /  AlkPhos  108  10-01    PT/INR - ( 02 Oct 2019 02:57 )   PT: 12.6 sec;   INR: 1.09 ratio         PTT - ( 02 Oct 2019 02:57 )  PTT:31.1 sec        MEDICATIONS  (STANDING):  acetaminophen   Tablet .. 650 milliGRAM(s) Oral every 6 hours  artificial  tears Solution 1 Drop(s) Both EYES two times a day  calcium carbonate 1250 mG  + Vitamin D (OsCal 500 + D) 1 Tablet(s) Oral three times a day  ciprofloxacin  0.3% Ophthalmic Solution 1 Drop(s) Both EYES every 6 hours  docusate sodium 100 milliGRAM(s) Oral three times a day  donepezil 5 milliGRAM(s) Oral at bedtime  enoxaparin Injectable 40 milliGRAM(s) SubCutaneous every 24 hours  erythromycin   Ointment 1 Application(s) Both EYES four times a day  finasteride 5 milliGRAM(s) Oral daily  metoprolol succinate ER 25 milliGRAM(s) Oral daily  morphine  - Injectable 4 milliGRAM(s) IV Push once  multivitamin 1 Tablet(s) Oral daily  pantoprazole    Tablet 40 milliGRAM(s) Oral before breakfast  polyethylene glycol 3350 17 Gram(s) Oral daily  sodium chloride 0.9%. 1000 milliLiter(s) (75 mL/Hr) IV Continuous <Continuous>  tamsulosin 0.4 milliGRAM(s) Oral at bedtime    MEDICATIONS  (PRN):  aluminum hydroxide/magnesium hydroxide/simethicone Suspension 30 milliLiter(s) Oral four times a day PRN Indigestion  HYDROmorphone  Injectable 0.5 milliGRAM(s) IV Push every 3 hours PRN Severe Pain (7 - 10)  magnesium hydroxide Suspension 30 milliLiter(s) Oral two times a day PRN Constipation  ondansetron Injectable 4 milliGRAM(s) IV Push every 6 hours PRN Nausea and/or Vomiting  oxyCODONE    IR 5 milliGRAM(s) Oral every 4 hours PRN Mild Pain (1 - 3)  oxyCODONE    IR 10 milliGRAM(s) Oral every 4 hours PRN Moderate Pain (4 - 6)  senna 2 Tablet(s) Oral at bedtime PRN Constipation

## 2019-10-04 LAB
ANION GAP SERPL CALC-SCNC: 11 MMOL/L — SIGNIFICANT CHANGE UP (ref 5–17)
BUN SERPL-MCNC: 59 MG/DL — HIGH (ref 8–20)
CALCIUM SERPL-MCNC: 7.9 MG/DL — LOW (ref 8.6–10.2)
CHLORIDE SERPL-SCNC: 105 MMOL/L — SIGNIFICANT CHANGE UP (ref 98–107)
CO2 SERPL-SCNC: 24 MMOL/L — SIGNIFICANT CHANGE UP (ref 22–29)
CREAT SERPL-MCNC: 2.78 MG/DL — HIGH (ref 0.5–1.3)
GLUCOSE SERPL-MCNC: 146 MG/DL — HIGH (ref 70–115)
HCT VFR BLD CALC: 37.2 % — LOW (ref 39–50)
HGB BLD-MCNC: 11.9 G/DL — LOW (ref 13–17)
MCHC RBC-ENTMCNC: 32 GM/DL — SIGNIFICANT CHANGE UP (ref 32–36)
MCHC RBC-ENTMCNC: 34.4 PG — HIGH (ref 27–34)
MCV RBC AUTO: 107.5 FL — HIGH (ref 80–100)
PLATELET # BLD AUTO: 185 K/UL — SIGNIFICANT CHANGE UP (ref 150–400)
POTASSIUM SERPL-MCNC: 3.8 MMOL/L — SIGNIFICANT CHANGE UP (ref 3.5–5.3)
POTASSIUM SERPL-SCNC: 3.8 MMOL/L — SIGNIFICANT CHANGE UP (ref 3.5–5.3)
RBC # BLD: 3.46 M/UL — LOW (ref 4.2–5.8)
RBC # FLD: 12.5 % — SIGNIFICANT CHANGE UP (ref 10.3–14.5)
SODIUM SERPL-SCNC: 140 MMOL/L — SIGNIFICANT CHANGE UP (ref 135–145)
UUN UR-MCNC: 855 MG/DL — SIGNIFICANT CHANGE UP
WBC # BLD: 10.14 K/UL — SIGNIFICANT CHANGE UP (ref 3.8–10.5)
WBC # FLD AUTO: 10.14 K/UL — SIGNIFICANT CHANGE UP (ref 3.8–10.5)

## 2019-10-04 PROCEDURE — 76770 US EXAM ABDO BACK WALL COMP: CPT | Mod: 26

## 2019-10-04 PROCEDURE — 99233 SBSQ HOSP IP/OBS HIGH 50: CPT

## 2019-10-04 PROCEDURE — 99232 SBSQ HOSP IP/OBS MODERATE 35: CPT

## 2019-10-04 PROCEDURE — 99024 POSTOP FOLLOW-UP VISIT: CPT

## 2019-10-04 RX ORDER — MAGNESIUM HYDROXIDE 400 MG/1
30 TABLET, CHEWABLE ORAL
Qty: 0 | Refills: 0 | DISCHARGE
Start: 2019-10-04

## 2019-10-04 RX ORDER — PANTOPRAZOLE SODIUM 20 MG/1
1 TABLET, DELAYED RELEASE ORAL
Qty: 0 | Refills: 0 | DISCHARGE
Start: 2019-10-04

## 2019-10-04 RX ORDER — ENOXAPARIN SODIUM 100 MG/ML
30 INJECTION SUBCUTANEOUS DAILY
Refills: 0 | Status: DISCONTINUED | OUTPATIENT
Start: 2019-10-04 | End: 2019-10-08

## 2019-10-04 RX ORDER — SODIUM CHLORIDE 9 MG/ML
1000 INJECTION INTRAMUSCULAR; INTRAVENOUS; SUBCUTANEOUS
Refills: 0 | Status: DISCONTINUED | OUTPATIENT
Start: 2019-10-04 | End: 2019-10-05

## 2019-10-04 RX ORDER — ACETAMINOPHEN 500 MG
2 TABLET ORAL
Qty: 0 | Refills: 0 | DISCHARGE
Start: 2019-10-04

## 2019-10-04 RX ADMIN — Medication 650 MILLIGRAM(S): at 05:36

## 2019-10-04 RX ADMIN — Medication 1 TABLET(S): at 21:58

## 2019-10-04 RX ADMIN — DONEPEZIL HYDROCHLORIDE 5 MILLIGRAM(S): 10 TABLET, FILM COATED ORAL at 21:58

## 2019-10-04 RX ADMIN — Medication 650 MILLIGRAM(S): at 23:56

## 2019-10-04 RX ADMIN — Medication 650 MILLIGRAM(S): at 13:04

## 2019-10-04 RX ADMIN — POLYETHYLENE GLYCOL 3350 17 GRAM(S): 17 POWDER, FOR SOLUTION ORAL at 13:04

## 2019-10-04 RX ADMIN — Medication 1 APPLICATION(S): at 18:05

## 2019-10-04 RX ADMIN — Medication 1 APPLICATION(S): at 23:57

## 2019-10-04 RX ADMIN — Medication 1 DROP(S): at 05:37

## 2019-10-04 RX ADMIN — Medication 1 DROP(S): at 05:38

## 2019-10-04 RX ADMIN — Medication 100 MILLIGRAM(S): at 13:04

## 2019-10-04 RX ADMIN — Medication 25 MILLIGRAM(S): at 05:38

## 2019-10-04 RX ADMIN — SODIUM CHLORIDE 75 MILLILITER(S): 9 INJECTION INTRAMUSCULAR; INTRAVENOUS; SUBCUTANEOUS at 05:37

## 2019-10-04 RX ADMIN — Medication 1 TABLET(S): at 13:04

## 2019-10-04 RX ADMIN — Medication 650 MILLIGRAM(S): at 05:38

## 2019-10-04 RX ADMIN — Medication 1 DROP(S): at 13:05

## 2019-10-04 RX ADMIN — Medication 100 MILLIGRAM(S): at 05:37

## 2019-10-04 RX ADMIN — Medication 650 MILLIGRAM(S): at 18:04

## 2019-10-04 RX ADMIN — Medication 650 MILLIGRAM(S): at 19:04

## 2019-10-04 RX ADMIN — Medication 1 APPLICATION(S): at 13:05

## 2019-10-04 RX ADMIN — Medication 650 MILLIGRAM(S): at 14:04

## 2019-10-04 RX ADMIN — TAMSULOSIN HYDROCHLORIDE 0.4 MILLIGRAM(S): 0.4 CAPSULE ORAL at 21:58

## 2019-10-04 RX ADMIN — ENOXAPARIN SODIUM 40 MILLIGRAM(S): 100 INJECTION SUBCUTANEOUS at 05:36

## 2019-10-04 RX ADMIN — FINASTERIDE 5 MILLIGRAM(S): 5 TABLET, FILM COATED ORAL at 13:03

## 2019-10-04 RX ADMIN — Medication 1 TABLET(S): at 05:37

## 2019-10-04 RX ADMIN — Medication 1 DROP(S): at 23:56

## 2019-10-04 RX ADMIN — Medication 1 APPLICATION(S): at 05:38

## 2019-10-04 RX ADMIN — Medication 100 MILLIGRAM(S): at 21:58

## 2019-10-04 RX ADMIN — SENNA PLUS 2 TABLET(S): 8.6 TABLET ORAL at 21:58

## 2019-10-04 RX ADMIN — SODIUM CHLORIDE 100 MILLILITER(S): 9 INJECTION INTRAMUSCULAR; INTRAVENOUS; SUBCUTANEOUS at 13:05

## 2019-10-04 RX ADMIN — PANTOPRAZOLE SODIUM 40 MILLIGRAM(S): 20 TABLET, DELAYED RELEASE ORAL at 05:38

## 2019-10-04 RX ADMIN — Medication 1 DROP(S): at 18:05

## 2019-10-04 RX ADMIN — ENOXAPARIN SODIUM 30 MILLIGRAM(S): 100 INJECTION SUBCUTANEOUS at 18:06

## 2019-10-04 NOTE — PROGRESS NOTE ADULT - ASSESSMENT
1) Right Femoral neck fracture,   s/p ORIF 10/2/19    2) JESSE , Superimposed on  CKD ( Stage ? )   on IVF ,   100cc on PVR;  Incontinent;  Consider roper catheter for 24 hours and TOV tomorrow    3) Chronic Diastolic CHF ( Lasix Held )     4) BPH  on finasteride and Flomax    dw Dr Lorenzo

## 2019-10-04 NOTE — PROGRESS NOTE ADULT - ASSESSMENT
89M pmh stage I diastolic hf, dementia, HTN, BPH presents after being found down, found to have hip fracture. Patient lives in [facility] and was seen for conjunctivitis. Pt discharged from ED back to group home. Pt is very poor historian despite use of ED . Unclear what happened after discharge, but was found down on ground by EMS. Pt only able to stay was walking across street when fell on right side and was unable to get up. Unable to offer any preceding events.     1)Right Femoral neck fracture presumably 2/2 mechanical fall  s/p ORIF 10/2/19  ortho following  PT rec [facility] once medically cleared     2) Bilateral Conjunctivitis, bacterial  will c/w cipro gtt and erythromycin ointment  clinically improving    3) Leukocytosis likely 2/2 conjunctivitis & hip fracture: resolved    4) JESSE vs CKD?  Cr and BUN on the rise   per discussion with renal seems to be still pre-renal  increase NS to 100cc/hr  continue to trend  renal ultrasound no hydronephrosis does have BPH and only 139cc of urine in bladder at that time.   per RN pt is incontinent. I requested to do a bladder scan to confirm pt is not retaining     5) Hypertension  c/w home antihypertensive of metoprolol.     6) Chronic Diastolic CHF  does no currently appear fluid overloaded.  hold lasix in setting of jesse.    7) Dementia  on fall precautions  currently no agitated and redirectable.     8) BPH  on finasteride and flomax    Dispo: [facility]O once medically cleared

## 2019-10-04 NOTE — PROGRESS NOTE ADULT - SUBJECTIVE AND OBJECTIVE BOX
JANAY COLLIER    [id number]    89y      Male    CC: right hip fracutre    INTERVAL HPI/OVERNIGHT EVENTS:  89M pmh stage I diastolic hf, dementia, HTN, BPH presents after being found down, found to have hip fracture. Patient lives in [facility] and was seen for conjunctivitis. Pt discharged from ED back to group home. Pt is very poor historian despite use of ED . Unclear what happened after discharge, but was found down on ground by EMS. Pt only able to stay was walking across street when fell on right side and was unable to get up. Unable to offer any preceding events.     Pt found to have a right hip fracture s/p ORIF    Pt is a poor historian, Pt denies any pain at this time    REVIEW OF SYSTEMS:    CONSTITUTIONAL: No fever or fatigue  RESPIRATORY: No cough, wheezing, No shortness of breath  CARDIOVASCULAR: No chest pain, palpitations  GASTROINTESTINAL: No abdominal or epigastric pain. No nausea, vomiting      Vital Signs Last 24 Hrs  T(C): 37.3 (04 Oct 2019 07:47), Max: 37.3 (04 Oct 2019 07:47)  T(F): 99.1 (04 Oct 2019 07:47), Max: 99.1 (04 Oct 2019 07:47)  HR: 82 (04 Oct 2019 07:47) (76 - 82)  BP: 118/54 (04 Oct 2019 07:47) (100/70 - 118/54)  BP(mean): --  RR: 19 (04 Oct 2019 07:47) (18 - 19)  SpO2: 99% (03 Oct 2019 23:55) (98% - 99%)    PHYSICAL EXAM:    GENERAL: NAD, well-groomed  HEENT: PERRL, +EOMI, positive b/l discharge from orbits   CHEST/LUNG: Clear to auscultation bilaterally; No wheezing  HEART: S1S2+, Regular rate and rhythm; No murmurs  ABDOMEN: Soft, Nontender, Nondistended; Bowel sounds present  EXTREMITIES:  2+ Peripheral Pulses, No clubbing, cyanosis, or edema      LABS:                        11.9   10.14 )-----------( 185      ( 04 Oct 2019 08:22 )             37.2     10-04    140  |  105  |  59.0<H>  ----------------------------<  146<H>  3.8   |  24.0  |  2.78<H>    Ca    7.9<L>      04 Oct 2019 08:22  Mg     2.0     10-03        Urinalysis Basic - ( 03 Oct 2019 17:32 )    Color: Yellow / Appearance: Clear / S.020 / pH: x  Gluc: x / Ketone: Trace  / Bili: Negative / Urobili: Negative mg/dL   Blood: x / Protein: 30 mg/dL / Nitrite: Negative   Leuk Esterase: Negative / RBC: 3-5 /HPF / WBC 0-2   Sq Epi: x / Non Sq Epi: Few / Bacteria: Occasional          MEDICATIONS  (STANDING):  acetaminophen   Tablet .. 650 milliGRAM(s) Oral every 6 hours  artificial  tears Solution 1 Drop(s) Both EYES two times a day  calcium carbonate 1250 mG  + Vitamin D (OsCal 500 + D) 1 Tablet(s) Oral three times a day  ciprofloxacin  0.3% Ophthalmic Solution 1 Drop(s) Both EYES every 6 hours  docusate sodium 100 milliGRAM(s) Oral three times a day  donepezil 5 milliGRAM(s) Oral at bedtime  enoxaparin Injectable 30 milliGRAM(s) SubCutaneous daily  erythromycin   Ointment 1 Application(s) Both EYES four times a day  finasteride 5 milliGRAM(s) Oral daily  metoprolol succinate ER 25 milliGRAM(s) Oral daily  morphine  - Injectable 4 milliGRAM(s) IV Push once  multivitamin 1 Tablet(s) Oral daily  pantoprazole    Tablet 40 milliGRAM(s) Oral before breakfast  polyethylene glycol 3350 17 Gram(s) Oral daily  sodium chloride 0.9%. 1000 milliLiter(s) (100 mL/Hr) IV Continuous <Continuous>  tamsulosin 0.4 milliGRAM(s) Oral at bedtime    MEDICATIONS  (PRN):  aluminum hydroxide/magnesium hydroxide/simethicone Suspension 30 milliLiter(s) Oral four times a day PRN Indigestion  bisacodyl Suppository 10 milliGRAM(s) Rectal daily PRN If no bowel movement by POD#2  HYDROmorphone  Injectable 0.5 milliGRAM(s) IV Push every 3 hours PRN Severe Pain (7 - 10)  magnesium hydroxide Suspension 30 milliLiter(s) Oral two times a day PRN Constipation  ondansetron Injectable 4 milliGRAM(s) IV Push every 6 hours PRN Nausea and/or Vomiting  oxyCODONE    IR 5 milliGRAM(s) Oral every 4 hours PRN Mild Pain (1 - 3)  oxyCODONE    IR 10 milliGRAM(s) Oral every 4 hours PRN Moderate Pain (4 - 6)  senna 2 Tablet(s) Oral at bedtime PRN Constipation      RADIOLOGY & ADDITIONAL TESTS:  Renal U/S:  Right kidney:  9.8 cm. No renal mass, hydronephrosis or calculi.    Left kidney:  10.1 cm. No renal mass, hydronephrosis or calculi.    Urinary bladder: Cystic/anechoic foci along the bladder wall, likely   either diverticula or trabeculations in the bladder wall. The prostate is   enlarged with intravesicular protrusion. Bladder volume of 139 mL.   Patient was unable to void at the time of the study.    IMPRESSION:     No hydronephrosis.    Enlarged prostate with intravesicular protrusion.    Trabeculated bladder wall versus small bladder diverticula.    DARIEL CUEVAS   This document has been electronically signed. Oct  4 2019  9:27AM

## 2019-10-04 NOTE — PROGRESS NOTE ADULT - SUBJECTIVE AND OBJECTIVE BOX
Ortho Progress note    Name: JANAY COLLIER    MR #: [id number]    Procedure: Right hip hemiarthroplasty  Surgeon: Dr Diane     Patient has dementia and does not follow commands  Patient seen resting comfortably in bed in NAD  Hip abduction pillow in place  PT states he has stood up and taken one step today    PAST MEDICAL & SURGICAL HISTORY:  Blindness of one eye  CHF (congestive heart failure): stage I diastolic  Dementia  Hypertension  Poor historian    General Exam:  Vital Signs Last 24 Hrs  T(C): 37.3 (04 Oct 2019 07:47), Max: 37.3 (04 Oct 2019 07:47)  T(F): 99.1 (04 Oct 2019 07:47), Max: 99.1 (04 Oct 2019 07:47)  HR: 82 (04 Oct 2019 07:47) (76 - 82)  BP: 118/54 (04 Oct 2019 07:47) (100/70 - 118/54)  BP(mean): --  RR: 19 (04 Oct 2019 07:47) (18 - 19)  SpO2: 99% (03 Oct 2019 23:55) (98% - 99%)    General: Pt Alert and oriented, NAD, controlled pain.  Right hip Dressings C/D/I.   Dressing removed  Incision healing well, prineo in place  No active drainage or discharge  small blister noted to distal anterior aspect of incision  blister intact- no incisional involvement  New dry sterile dressing applied  Pulses: 2+ dorsalis pedis pulse. Cap refill < 2 sec.  Unable to assess sensation due to lack of ability to communicate properly   Motor: able to plantar/ dorsiflex, no foot drop                                11.9   10.14 )-----------( 185      ( 04 Oct 2019 08:22 )             37.2     A/P: 89yMale POD#2 s/p Right hip hemiarthroplasty     - Ortho Stable  - Pain Control  - DVT ppx: Lovenox x 4 weeks   - Weight bearing status: WBAT with posterior hip precautions   - Hip abduction pillow while in bed   - Anticipated discharge to [facility] when medically stable  - Follow up with Dr. Diane on 10/24 in office

## 2019-10-04 NOTE — PROGRESS NOTE ADULT - SUBJECTIVE AND OBJECTIVE BOX
[facility] DIVISION OF KIDNEY DISEASES AND HYPERTENSION -- FOLLOW UP NOTE  --------------------------------------------------------------------------------  Chief Complaint:  JESSE    24 hour events/subjective:  Worsening JESSE;   Terry < 30  Cr 2.1 to 2.7  On NS @ 75cc/hr      PAST HISTORY  --------------------------------------------------------------------------------  No significant changes to PMH, PSH, FHx, SHx, unless otherwise noted    ALLERGIES & MEDICATIONS  --------------------------------------------------------------------------------  Allergies    No Known Allergies    Intolerances      Standing Inpatient Medications  acetaminophen   Tablet .. 650 milliGRAM(s) Oral every 6 hours  artificial  tears Solution 1 Drop(s) Both EYES two times a day  calcium carbonate 1250 mG  + Vitamin D (OsCal 500 + D) 1 Tablet(s) Oral three times a day  ciprofloxacin  0.3% Ophthalmic Solution 1 Drop(s) Both EYES every 6 hours  docusate sodium 100 milliGRAM(s) Oral three times a day  donepezil 5 milliGRAM(s) Oral at bedtime  enoxaparin Injectable 30 milliGRAM(s) SubCutaneous daily  erythromycin   Ointment 1 Application(s) Both EYES four times a day  finasteride 5 milliGRAM(s) Oral daily  metoprolol succinate ER 25 milliGRAM(s) Oral daily  morphine  - Injectable 4 milliGRAM(s) IV Push once  multivitamin 1 Tablet(s) Oral daily  pantoprazole    Tablet 40 milliGRAM(s) Oral before breakfast  polyethylene glycol 3350 17 Gram(s) Oral daily  sodium chloride 0.9%. 1000 milliLiter(s) IV Continuous <Continuous>  tamsulosin 0.4 milliGRAM(s) Oral at bedtime    PRN Inpatient Medications  aluminum hydroxide/magnesium hydroxide/simethicone Suspension 30 milliLiter(s) Oral four times a day PRN  bisacodyl Suppository 10 milliGRAM(s) Rectal daily PRN  HYDROmorphone  Injectable 0.5 milliGRAM(s) IV Push every 3 hours PRN  magnesium hydroxide Suspension 30 milliLiter(s) Oral two times a day PRN  ondansetron Injectable 4 milliGRAM(s) IV Push every 6 hours PRN  oxyCODONE    IR 5 milliGRAM(s) Oral every 4 hours PRN  oxyCODONE    IR 10 milliGRAM(s) Oral every 4 hours PRN  senna 2 Tablet(s) Oral at bedtime PRN      REVIEW OF SYSTEMS  --------------------------------------------------------------------------------  Unable to obtain    VITALS/PHYSICAL EXAM  --------------------------------------------------------------------------------  T(C): 37.3 (10-04-19 @ 07:47), Max: 37.3 (10-04-19 @ 07:47)  HR: 82 (10-04-19 @ 07:47) (76 - 82)  BP: 118/54 (10-04-19 @ 07:47) (100/70 - 118/54)  RR: 19 (10-04-19 @ 07:47) (18 - 19)  SpO2: 99% (10-03-19 @ 23:55) (98% - 99%)  Wt(kg): --        10-03-19 @ 07:01  -  10-04-19 @ 07:00  --------------------------------------------------------  IN: 1800 mL / OUT: 0 mL / NET: 1800 mL      Physical Exam:  GENERAL: NAD, Pleasantly confused,   HEAD:  Atraumatic, Normocephalic  EYES: EOMI, conjunctiva injected,   ENMT: Moist mucous membranes,   NECK: Supple, No JVD,   NERVOUS SYSTEM:  Awake, confused,   CHEST/LUNG: Clear to percussion bilaterally; No rales, rhonchi, wheezing, or rubs  HEART: Regular rate and rhythm; No murmurs, rubs, or gallops  ABDOMEN: Soft, Nontender, Nondistended; Bowel sounds present  EXTREMITIES:  2+ Peripheral Pulses, No clubbing, cyanosis, or edema  LYMPH: No lymphadenopathy noted  SKIN: No rashes or lesions      LABS/STUDIES  --------------------------------------------------------------------------------              11.9   10.14 >-----------<  185      [10-04-19 @ 08:22]              37.2     140  |  105  |  59.0  ----------------------------<  146      [10-04-19 @ 08:22]  3.8   |  24.0  |  2.78        Ca     7.9     [10-04-19 @ 08:22]      Mg     2.0     [10-03-19 @ 08:10]            Creatinine Trend:  SCr 2.78 [10-04 @ 08:22]  SCr 2.10 [10-03 @ 08:10]  SCr 1.81 [10-01 @ 21:57]    Urinalysis - [10-03-19 @ 17:32]      Color Yellow / Appearance Clear / SG 1.020 / pH 5.0      Gluc 50 / Ketone Trace  / Bili Negative / Urobili Negative       Blood Small / Protein 30 / Leuk Est Negative / Nitrite Negative      RBC 3-5 / WBC 0-2 / Hyaline  / Gran  / Sq Epi  / Non Sq Epi Few / Bacteria Occasional    Urine Sodium <30      [10-03-19 @ 17:32]  Urine Potassium 70      [10-03-19 @ 17:32]  Urine Osmolality 605      [10-03-19 @ 17:32]

## 2019-10-05 LAB
ANION GAP SERPL CALC-SCNC: 10 MMOL/L — SIGNIFICANT CHANGE UP (ref 5–17)
BUN SERPL-MCNC: 47 MG/DL — HIGH (ref 8–20)
CALCIUM SERPL-MCNC: 8.4 MG/DL — LOW (ref 8.6–10.2)
CHLORIDE SERPL-SCNC: 112 MMOL/L — HIGH (ref 98–107)
CO2 SERPL-SCNC: 24 MMOL/L — SIGNIFICANT CHANGE UP (ref 22–29)
CREAT SERPL-MCNC: 1.72 MG/DL — HIGH (ref 0.5–1.3)
GLUCOSE SERPL-MCNC: 136 MG/DL — HIGH (ref 70–115)
MAGNESIUM SERPL-MCNC: 2.3 MG/DL — SIGNIFICANT CHANGE UP (ref 1.8–2.6)
POTASSIUM SERPL-MCNC: 4.2 MMOL/L — SIGNIFICANT CHANGE UP (ref 3.5–5.3)
POTASSIUM SERPL-SCNC: 4.2 MMOL/L — SIGNIFICANT CHANGE UP (ref 3.5–5.3)
SODIUM SERPL-SCNC: 146 MMOL/L — HIGH (ref 135–145)

## 2019-10-05 PROCEDURE — 99232 SBSQ HOSP IP/OBS MODERATE 35: CPT

## 2019-10-05 PROCEDURE — 99233 SBSQ HOSP IP/OBS HIGH 50: CPT

## 2019-10-05 RX ORDER — SODIUM CHLORIDE 9 MG/ML
1000 INJECTION, SOLUTION INTRAVENOUS
Refills: 0 | Status: DISCONTINUED | OUTPATIENT
Start: 2019-10-05 | End: 2019-10-05

## 2019-10-05 RX ORDER — SODIUM BICARBONATE 1 MEQ/ML
0.07 SYRINGE (ML) INTRAVENOUS
Qty: 75 | Refills: 0 | Status: DISCONTINUED | OUTPATIENT
Start: 2019-10-05 | End: 2019-10-06

## 2019-10-05 RX ADMIN — Medication 1 APPLICATION(S): at 18:05

## 2019-10-05 RX ADMIN — Medication 650 MILLIGRAM(S): at 11:25

## 2019-10-05 RX ADMIN — Medication 650 MILLIGRAM(S): at 12:15

## 2019-10-05 RX ADMIN — Medication 1 DROP(S): at 18:06

## 2019-10-05 RX ADMIN — Medication 1 APPLICATION(S): at 11:26

## 2019-10-05 RX ADMIN — Medication 650 MILLIGRAM(S): at 18:06

## 2019-10-05 RX ADMIN — DONEPEZIL HYDROCHLORIDE 5 MILLIGRAM(S): 10 TABLET, FILM COATED ORAL at 21:59

## 2019-10-05 RX ADMIN — TAMSULOSIN HYDROCHLORIDE 0.4 MILLIGRAM(S): 0.4 CAPSULE ORAL at 21:58

## 2019-10-05 RX ADMIN — Medication 1 APPLICATION(S): at 05:14

## 2019-10-05 RX ADMIN — Medication 1 TABLET(S): at 11:25

## 2019-10-05 RX ADMIN — FINASTERIDE 5 MILLIGRAM(S): 5 TABLET, FILM COATED ORAL at 13:55

## 2019-10-05 RX ADMIN — Medication 650 MILLIGRAM(S): at 00:30

## 2019-10-05 RX ADMIN — ENOXAPARIN SODIUM 30 MILLIGRAM(S): 100 INJECTION SUBCUTANEOUS at 11:25

## 2019-10-05 RX ADMIN — Medication 1 DROP(S): at 23:14

## 2019-10-05 RX ADMIN — Medication 100 MILLIGRAM(S): at 21:59

## 2019-10-05 RX ADMIN — Medication 100 MILLIGRAM(S): at 05:12

## 2019-10-05 RX ADMIN — Medication 80 MEQ/KG/HR: at 21:59

## 2019-10-05 RX ADMIN — SODIUM CHLORIDE 100 MILLILITER(S): 9 INJECTION INTRAMUSCULAR; INTRAVENOUS; SUBCUTANEOUS at 05:11

## 2019-10-05 RX ADMIN — Medication 1 DROP(S): at 05:14

## 2019-10-05 RX ADMIN — PANTOPRAZOLE SODIUM 40 MILLIGRAM(S): 20 TABLET, DELAYED RELEASE ORAL at 05:15

## 2019-10-05 RX ADMIN — Medication 650 MILLIGRAM(S): at 18:31

## 2019-10-05 RX ADMIN — POLYETHYLENE GLYCOL 3350 17 GRAM(S): 17 POWDER, FOR SOLUTION ORAL at 11:25

## 2019-10-05 RX ADMIN — Medication 650 MILLIGRAM(S): at 05:12

## 2019-10-05 RX ADMIN — Medication 1 DROP(S): at 18:05

## 2019-10-05 RX ADMIN — Medication 1 DROP(S): at 11:26

## 2019-10-05 RX ADMIN — Medication 1 TABLET(S): at 05:12

## 2019-10-05 RX ADMIN — Medication 80 MEQ/KG/HR: at 15:23

## 2019-10-05 RX ADMIN — Medication 650 MILLIGRAM(S): at 05:45

## 2019-10-05 RX ADMIN — Medication 1 APPLICATION(S): at 23:15

## 2019-10-05 RX ADMIN — Medication 1 DROP(S): at 05:13

## 2019-10-05 RX ADMIN — Medication 100 MILLIGRAM(S): at 13:55

## 2019-10-05 NOTE — PROGRESS NOTE ADULT - SUBJECTIVE AND OBJECTIVE BOX
Vital Signs Last 24 Hrs  T(C): 36.7 (05 Oct 2019 07:59), Max: 36.8 (04 Oct 2019 16:55)  T(F): 98 (05 Oct 2019 07:59), Max: 98.3 (04 Oct 2019 16:55)  HR: 56 (05 Oct 2019 07:59) (56 - 79)  BP: 138/78 (05 Oct 2019 07:59) (119/70 - 138/78)  RR: 18 (05 Oct 2019 07:59) (18 - 18)  SpO2: 98% (05 Oct 2019 07:59) (94% - 98%)    146<H>  |  112<H>  |  47.0<H>  ----------------------------<  136<H>  Ca:8.4<L> (05 Oct 2019 09:00)  4.2     |  24.0   |  1.72<H>    eGFR if Non : 34 <L>  eGFR if : 40 <L>                        11.9<L>  10.14 )-----------( 185      ( 04 Oct 2019 08:22 )             37.2<L>    M.3 mg/dL    Urinalysis Basic - ( 03 Oct 2019 17:32 )  Color: Yellow / Appearance: Clear / S.020 / pH: x  Gluc: x / Ketone: Trace<!>  / Bili: Negative / Urobili: Negative mg/dL   Blood: x / Protein: 30 mg/dL<!> / Nitrite: Negative   Leuk Esterase: Negative / RBC: 3-5 /HPF<!> / WBC 0-2   Sq Epi: x / Non Sq Epi: Few / Bacteria: Occasional<!>      Terry:<30 mmol/L UOsm:605 mosm/kg UVol:-- UCl:-- UK:70 mmol/L (10-03 @ 17:32)    Chief Complaint:  JESSE , Pre & Post - Renal,     24 hour events/subjective: JESSE Resolving, Clear urine, More alert,     PAST HISTORY  --------------------------------------------------------------------------------  No significant changes to PMH, PSH, FHx, SHx, unless otherwise noted    ALLERGIES & MEDICATIONS  --------------------------------------------------------------------------------  Allergies    No Known Allergies    Standing Inpatient Medications  acetaminophen   Tablet .. 650 milliGRAM(s) Oral every 6 hours  artificial  tears Solution 1 Drop(s) Both EYES two times a day  calcium carbonate 1250 mG  + Vitamin D (OsCal 500 + D) 1 Tablet(s) Oral three times a day  ciprofloxacin  0.3% Ophthalmic Solution 1 Drop(s) Both EYES every 6 hours  docusate sodium 100 milliGRAM(s) Oral three times a day  donepezil 5 milliGRAM(s) Oral at bedtime  enoxaparin Injectable 30 milliGRAM(s) SubCutaneous daily  erythromycin   Ointment 1 Application(s) Both EYES four times a day  finasteride 5 milliGRAM(s) Oral daily  metoprolol succinate ER 25 milliGRAM(s) Oral daily  morphine  - Injectable 4 milliGRAM(s) IV Push once  multivitamin 1 Tablet(s) Oral daily  pantoprazole    Tablet 40 milliGRAM(s) Oral before breakfast  polyethylene glycol 3350 17 Gram(s) Oral daily  sodium chloride 0.9%. 1000 milliLiter(s) IV Continuous <Continuous>  tamsulosin 0.4 milliGRAM(s) Oral at bedtime    PRN Inpatient Medications  aluminum hydroxide/magnesium hydroxide/simethicone Suspension 30 milliLiter(s) Oral four times a day PRN  bisacodyl Suppository 10 milliGRAM(s) Rectal daily PRN  HYDROmorphone  Injectable 0.5 milliGRAM(s) IV Push every 3 hours PRN  magnesium hydroxide Suspension 30 milliLiter(s) Oral two times a day PRN  ondansetron Injectable 4 milliGRAM(s) IV Push every 6 hours PRN  oxyCODONE    IR 5 milliGRAM(s) Oral every 4 hours PRN  oxyCODONE    IR 10 milliGRAM(s) Oral every 4 hours PRN  senna 2 Tablet(s) Oral at bedtime PRN    REVIEW OF SYSTEMS  --------------------------------------------------------------------------------  Unable to obtain    VITALS/PHYSICAL EXAM  --------------------------------------------------------------------------------  T(C): 37.3 (10-04-19 @ 07:47), Max: 37.3 (10-04-19 @ 07:47)  HR: 82 (10-04-19 @ 07:47) (76 - 82)  BP: 118/54 (10-04-19 @ 07:47) (100/70 - 118/54)  RR: 19 (10-04-19 @ 07:47) (18 - 19)  SpO2: 99% (10-03-19 @ 23:55) (98% - 99%)    10-03-19 @ 07:01  -  10-04-19 @ 07:00  --------------------------------------------------------  IN: 1800 mL / OUT: 0 mL / NET: 1800 mL    Physical Exam:    GENERAL: NAD, Pleasantly confused,   HEAD:  Atraumatic, Normocephalic  EYES: EOMI, conjunctiva injected,   ENMT: Moist mucous membranes,   NECK: Supple, No JVD,   NERVOUS SYSTEM:  Awake, confused,   CHEST/LUNG: Clear to percussion bilaterally; No rales, rhonchi, wheezing, or rubs  HEART: Regular rate and rhythm; No murmurs, rubs, or gallops  ABDOMEN: Soft, Nontender, Nondistended; Bowel sounds present  EXTREMITIES:  2+ Peripheral Pulses, No clubbing, cyanosis, or edema  LYMPH: No lymphadenopathy noted  SKIN: No rashes or lesions , Hips Abducted, + Gayatri,     LABS/STUDIES  --------------------------------------------------------------------------------              11.9   10.14 >-----------<  185      [10-04-19 @ 08:22]              37.2     140  |  105  |  59.0  ----------------------------<  146      [10-04-19 @ 08:22]  3.8   |  24.0  |  2.78        Ca     7.9     [10-04-19 @ 08:22]      Mg     2.0     [10-03-19 @ 08:10]    Creatinine Trend:  SCr 2.78 [10-04 @ 08:22]  SCr 2.10 [10-03 @ 08:10]  SCr 1.81 [10-01 @ 21:57]    Urinalysis - [10-03-19 @ 17:32]      Color Yellow / Appearance Clear / SG 1.020 / pH 5.0      Gluc 50 / Ketone Trace  / Bili Negative / Urobili Negative       Blood Small / Protein 30 / Leuk Est Negative / Nitrite Negative      RBC 3-5 / WBC 0-2 / Hyaline  / Gran  / Sq Epi  / Non Sq Epi Few / Bacteria Occasional    Urine Sodium <30      [10-03-19 @ 17:32]  Urine Potassium 70      [10-03-19 @ 17:32]  Urine Osmolality 605      [10-03-19 @ 17:32]    1) Right Femoral neck fracture,   s/p ORIF 10/2/19    2) JESSE , Superimposed on  CKD ( Stage 3 )     3) Chronic Diastolic CHF ( Lasix Held )     4) BPH  on finasteride and Flomax

## 2019-10-05 NOTE — PROGRESS NOTE ADULT - ASSESSMENT
89M pmh stage I diastolic hf, dementia, HTN, BPH presents after being found down, found to have hip fracture. Patient lives in [facility] and was seen for conjunctivitis. Pt discharged from ED back to group home. Pt is very poor historian despite use of ED . Unclear what happened after discharge, but was found down on ground by EMS. Pt only able to stay was walking across street when fell on right side and was unable to get up. Unable to offer any preceding events.     1)Right Femoral neck fracture presumably 2/2 mechanical fall  s/p ORIF 10/2/19  ortho following  PT rec [facility] once medically cleared     2) Bilateral Conjunctivitis, bacterial, improving  will c/w cipro gtt and erythromycin ointment    3) Leukocytosis likely 2/2 conjunctivitis & hip fracture: resolved    4) JESSE sec to obstructive uropathy  Cr and BUN improving  likely pre-renal, nephro fu appreciated  fluids per nephro  continue to trend  renal ultrasound no hydronephrosis, does have BPH but w/o retention  per RN pt is incontinent. I requested to do a bladder scan to confirm pt is not retaining     5) Hypertension, complicated by urgency, asymptomatic  c/w home antihypertensive of metoprolol, titrate if serial bp remains elevated.     6) Chronic Diastolic CHF  does no currently appear fluid overloaded.  hold lasix in setting of jesse.    7) Dementia  on fall precautions  currently no agitated and redirectable.     8) BPH  on finasteride and flomax    9) Hypernatremia. mild, asymptomatic  fluids per renal, serial bmp    10) anemia, mild, asymptomatic, no acute/active s/s of blood loss, no baseline hgb    Dispo: [facility] once medically cleared

## 2019-10-05 NOTE — PROGRESS NOTE ADULT - ASSESSMENT
DX : JESSE - Obstructive uropathy,    Resolving,    IVF Modified ( High Cl- )    Avoid Mg++ , Al Containing meds,    Trend Scr.,  U. O,     Consider Urology evaluation,     Please call as needed, TY,

## 2019-10-05 NOTE — PROGRESS NOTE ADULT - SUBJECTIVE AND OBJECTIVE BOX
CC: lolis    Patient seen and examined at the bedside. No acute overnight events. Complaining of nothing. denies fever/chills, headache, lightheadedness, dizziness, chest pain, palpitations, shortness of breath, cough, abd pain, nausea/vomiting/diarrhea, muscle pain.      =========================================================================================  T(C): 36.7 (10-05-19 @ 15:41), Max: 36.7 (10-05-19 @ 00:06)  HR: 73 (10-05-19 @ 15:41) (56 - 79)  BP: 140/76 (10-05-19 @ 15:41) (128/62 - 140/76)  RR: 18 (10-05-19 @ 15:41) (18 - 18)  SpO2: 93% (10-05-19 @ 15:41) (93% - 98%)    PHYSICAL EXAM.    GEN - appears age appropriate. well nourished. pleasant. no distress.   HEENT - NCAT, EOMI, HARPER  RESP - CTA BL, no wheeze/stridor/rhonchi/crackles. on supplemental O2, nc. able to speak in full sentences without distress.   CARDIO - NS1S2, RRR. No murmurs/rubs/gallops.  ABD - Soft/Non tender/Non distended. Normal BS x4 quadrants. no guarding/rebound tenderness.  Ext - No KACIE.  MSK - BL 5/5 strength on upper and lower extremities.   Neuro - AAOx2. cn 2-12 grossly intact  Psych - normal affect  Skin - c/d/i. no rashes/lesions      I&O's Summary    04 Oct 2019 07:01  -  05 Oct 2019 07:00  --------------------------------------------------------  IN: 0 mL / OUT: 650 mL / NET: -650 mL    05 Oct 2019 07:01  -  05 Oct 2019 20:21  --------------------------------------------------------  IN: 1120 mL / OUT: 0 mL / NET: 1120 mL      Daily     Daily     =========================================================================================  LABS.    10-05    146<H>  |  112<H>  |  47.0<H>  ----------------------------<  136<H>  4.2   |  24.0  |  1.72<H>    Ca    8.4<L>      05 Oct 2019 09:00  Mg     2.3     10-05                            11.9   10.14 )-----------( 185      ( 04 Oct 2019 08:22 )             37.2                   =========================================================================================  IMAGING.     =========================================================================================    HOME MEDS.    Home Medications:  acetaminophen 325 mg oral tablet: 2 tab(s) orally every 6 hours (04 Oct 2019 11:01)  aluminum hydroxide-magnesium hydroxide 200 mg-200 mg/5 mL oral suspension: 30 milliliter(s) orally 4 times a day, As needed, Indigestion (04 Oct 2019 11:04)  calcium-vitamin D 500 mg-200 intl units oral tablet: 1 tab(s) orally 3 times a day (04 Oct 2019 11:05)  Colace 100 mg oral capsule: 1 cap(s) orally once a day (02 Oct 2019 02:26)  donepezil 5 mg oral tablet: 1 tab(s) orally once a day (at bedtime) (02 Oct 2019 02:26)  finasteride 5 mg oral tablet: 1 tab(s) orally once a day (02 Oct 2019 02:26)  magnesium hydroxide 8% oral suspension: 30 milliliter(s) orally 2 times a day, As needed, Constipation (04 Oct 2019 11:04)  metoprolol succinate 25 mg oral tablet, extended release: 1 tab(s) orally once a day (02 Oct 2019 02:26)  Multiple Vitamins oral tablet: 1 tab(s) orally once a day (04 Oct 2019 11:05)  pantoprazole 40 mg oral delayed release tablet: 1 tab(s) orally once a day (before a meal) (04 Oct 2019 11:05)  ramipril 5 mg oral capsule: 1 cap(s) orally once a day (02 Oct 2019 02:26)  Restasis 0.05% ophthalmic emulsion: 1 drop(s) to each affected eye every 12 hours (02 Oct 2019 02:26)  tamsulosin 0.4 mg oral capsule: 1 cap(s) orally once a day (at bedtime) (02 Oct 2019 02:26)      =========================================================================================    HOSPITAL MEDS.    MEDICATIONS  (STANDING):  artificial  tears Solution 1 Drop(s) Both EYES two times a day  ciprofloxacin  0.3% Ophthalmic Solution 1 Drop(s) Both EYES every 6 hours  docusate sodium 100 milliGRAM(s) Oral three times a day  donepezil 5 milliGRAM(s) Oral at bedtime  enoxaparin Injectable 30 milliGRAM(s) SubCutaneous daily  erythromycin   Ointment 1 Application(s) Both EYES four times a day  finasteride 5 milliGRAM(s) Oral daily  metoprolol succinate ER 25 milliGRAM(s) Oral daily  morphine  - Injectable 4 milliGRAM(s) IV Push once  multivitamin 1 Tablet(s) Oral daily  pantoprazole    Tablet 40 milliGRAM(s) Oral before breakfast  polyethylene glycol 3350 17 Gram(s) Oral daily  sodium bicarbonate  Infusion 0.068 mEq/kG/Hr (80 mL/Hr) IV Continuous <Continuous>  tamsulosin 0.4 milliGRAM(s) Oral at bedtime    MEDICATIONS  (PRN):  bisacodyl Suppository 10 milliGRAM(s) Rectal daily PRN If no bowel movement by POD#2  HYDROmorphone  Injectable 0.5 milliGRAM(s) IV Push every 3 hours PRN Severe Pain (7 - 10)  ondansetron Injectable 4 milliGRAM(s) IV Push every 6 hours PRN Nausea and/or Vomiting  oxyCODONE    IR 5 milliGRAM(s) Oral every 4 hours PRN Mild Pain (1 - 3)  oxyCODONE    IR 10 milliGRAM(s) Oral every 4 hours PRN Moderate Pain (4 - 6)  senna 2 Tablet(s) Oral at bedtime PRN Constipation

## 2019-10-06 LAB
ANION GAP SERPL CALC-SCNC: 13 MMOL/L — SIGNIFICANT CHANGE UP (ref 5–17)
BUN SERPL-MCNC: 31 MG/DL — HIGH (ref 8–20)
CALCIUM SERPL-MCNC: 8.2 MG/DL — LOW (ref 8.6–10.2)
CHLORIDE SERPL-SCNC: 104 MMOL/L — SIGNIFICANT CHANGE UP (ref 98–107)
CO2 SERPL-SCNC: 23 MMOL/L — SIGNIFICANT CHANGE UP (ref 22–29)
CREAT SERPL-MCNC: 1.19 MG/DL — SIGNIFICANT CHANGE UP (ref 0.5–1.3)
GLUCOSE SERPL-MCNC: 148 MG/DL — HIGH (ref 70–115)
HCT VFR BLD CALC: 39.4 % — SIGNIFICANT CHANGE UP (ref 39–50)
HGB BLD-MCNC: 12.6 G/DL — LOW (ref 13–17)
MAGNESIUM SERPL-MCNC: 2.1 MG/DL — SIGNIFICANT CHANGE UP (ref 1.6–2.6)
MCHC RBC-ENTMCNC: 32 GM/DL — SIGNIFICANT CHANGE UP (ref 32–36)
MCHC RBC-ENTMCNC: 33.5 PG — SIGNIFICANT CHANGE UP (ref 27–34)
MCV RBC AUTO: 104.8 FL — HIGH (ref 80–100)
PHOSPHATE SERPL-MCNC: 1.9 MG/DL — LOW (ref 2.4–4.7)
PLATELET # BLD AUTO: 205 K/UL — SIGNIFICANT CHANGE UP (ref 150–400)
POTASSIUM SERPL-MCNC: 4.6 MMOL/L — SIGNIFICANT CHANGE UP (ref 3.5–5.3)
POTASSIUM SERPL-SCNC: 4.6 MMOL/L — SIGNIFICANT CHANGE UP (ref 3.5–5.3)
RBC # BLD: 3.76 M/UL — LOW (ref 4.2–5.8)
RBC # FLD: 12 % — SIGNIFICANT CHANGE UP (ref 10.3–14.5)
SODIUM SERPL-SCNC: 140 MMOL/L — SIGNIFICANT CHANGE UP (ref 135–145)
WBC # BLD: 9.3 K/UL — SIGNIFICANT CHANGE UP (ref 3.8–10.5)
WBC # FLD AUTO: 9.3 K/UL — SIGNIFICANT CHANGE UP (ref 3.8–10.5)

## 2019-10-06 PROCEDURE — 99232 SBSQ HOSP IP/OBS MODERATE 35: CPT

## 2019-10-06 PROCEDURE — 71045 X-RAY EXAM CHEST 1 VIEW: CPT | Mod: 26

## 2019-10-06 RX ADMIN — Medication 1 TABLET(S): at 12:29

## 2019-10-06 RX ADMIN — FINASTERIDE 5 MILLIGRAM(S): 5 TABLET, FILM COATED ORAL at 12:29

## 2019-10-06 RX ADMIN — Medication 1 DROP(S): at 18:17

## 2019-10-06 RX ADMIN — Medication 1 DROP(S): at 18:18

## 2019-10-06 RX ADMIN — Medication 1 APPLICATION(S): at 12:29

## 2019-10-06 RX ADMIN — DONEPEZIL HYDROCHLORIDE 5 MILLIGRAM(S): 10 TABLET, FILM COATED ORAL at 21:08

## 2019-10-06 RX ADMIN — Medication 1 DROP(S): at 05:09

## 2019-10-06 RX ADMIN — Medication 25 MILLIGRAM(S): at 05:07

## 2019-10-06 RX ADMIN — Medication 1 DROP(S): at 05:10

## 2019-10-06 RX ADMIN — Medication 1 APPLICATION(S): at 05:08

## 2019-10-06 RX ADMIN — Medication 100 MILLIGRAM(S): at 05:08

## 2019-10-06 RX ADMIN — Medication 100 MILLIGRAM(S): at 21:08

## 2019-10-06 RX ADMIN — Medication 1 APPLICATION(S): at 18:17

## 2019-10-06 RX ADMIN — ENOXAPARIN SODIUM 30 MILLIGRAM(S): 100 INJECTION SUBCUTANEOUS at 12:30

## 2019-10-06 RX ADMIN — POLYETHYLENE GLYCOL 3350 17 GRAM(S): 17 POWDER, FOR SOLUTION ORAL at 12:29

## 2019-10-06 RX ADMIN — TAMSULOSIN HYDROCHLORIDE 0.4 MILLIGRAM(S): 0.4 CAPSULE ORAL at 21:08

## 2019-10-06 RX ADMIN — Medication 100 MILLIGRAM(S): at 13:53

## 2019-10-06 RX ADMIN — PANTOPRAZOLE SODIUM 40 MILLIGRAM(S): 20 TABLET, DELAYED RELEASE ORAL at 05:07

## 2019-10-06 RX ADMIN — Medication 1 DROP(S): at 12:30

## 2019-10-06 NOTE — DIETITIAN INITIAL EVALUATION ADULT. - PERTINENT LABORATORY DATA
10-06 Na140 mmol/L Glu 148 mg/dL<H> K+ 4.6 mmol/L Cr  1.19 mg/dL BUN 31.0 mg/dL<H> Phos 1.9 mg/dL<L> Alb n/a   PAB n/a

## 2019-10-06 NOTE — DIETITIAN INITIAL EVALUATION ADULT. - OTHER INFO
Per MD note:  89M pmh stage I diastolic hf, dementia, HTN, BPH presents after being found down, found to have hip fracture. Patient lives in [facility] and was seen for conjunctivitis. Pt discharged from ED back to group home. Pt is very poor historian despite use of ED . Unclear what happened after discharge, but was found down on ground by EMS. Pt only able to stay was walking across street when fell on right side and was unable to get up. Unable to offer any preceding events.     1)Right Femoral neck fracture presumably 2/2 mechanical fall  2) Bilateral Conjunctivitis  3) Leukocytosis  4) JESSE sec to obstructive uropathy  5) Hypertension,   6) Chronic Diastolic CHF  7) Dementia  8) BPH  9) Hypernatremia.  10) anemia, mild,  pt tolerating meals with good PO intake, reports he is hungry at the moment

## 2019-10-06 NOTE — PROGRESS NOTE ADULT - SUBJECTIVE AND OBJECTIVE BOX
CC: hip fracture (05 Oct 2019 20:20)    INTERVAL HPI/OVERNIGHT EVENTS:  no acute events  patient without complaints    Vital Signs Last 24 Hrs  T(C): 37 (06 Oct 2019 08:56), Max: 37 (06 Oct 2019 08:56)  T(F): 98.6 (06 Oct 2019 08:56), Max: 98.6 (06 Oct 2019 08:56)  HR: 77 (06 Oct 2019 08:56) (73 - 77)  BP: 144/65 (06 Oct 2019 08:56) (136/71 - 144/65)  BP(mean): --  RR: 20 (06 Oct 2019 08:56) (18 - 20)  SpO2: 92% (06 Oct 2019 08:56) (92% - 94%)    PHYSICAL EXAM:  General: in no acute distress  Eyes: PERRLA, EOMI; conjunctiva slightly erythematous but otherwise no purulence  Head: Normocephalic; atraumatic  ENMT: No nasal discharge; airway clear  Neck: Supple; non tender; no masses  Respiratory: No wheezes, rales or rhonchi  Cardiovascular: Regular rate and rhythm. S1 and S2 Normal; No murmurs, gallops or rubs  Gastrointestinal: Soft non-tender non-distended; Normal bowel sounds  Extremities: Normal range of motion, No clubbing, cyanosis or edema  Vascular: Peripheral pulses palpable 2+ bilaterally  Skin: Warm and dry. No acute rash  Psychiatric: Cooperative and appropriate    I&O's Detail    05 Oct 2019 07:01  -  06 Oct 2019 07:00  --------------------------------------------------------  IN:    sodium bicarbonate  Infusion: 320 mL    sodium chloride 0.9%: 800 mL  Total IN: 1120 mL    OUT:    Indwelling Catheter - Urethral: 500 mL  Total OUT: 500 mL    Total NET: 620 mL    06 Oct 2019 07:01  -  06 Oct 2019 14:41  --------------------------------------------------------  IN:    sodium bicarbonate  Infusion: 240 mL  Total IN: 240 mL    OUT:    Indwelling Catheter - Urethral: 350 mL  Total OUT: 350 mL    Total NET: -110 mL                        12.6   9.30  )-----------( 205      ( 06 Oct 2019 13:05 )             39.4     06 Oct 2019 09:51    140    |  104    |  31.0   ----------------------------<  148    4.6     |  23.0   |  1.19     Ca    8.2        06 Oct 2019 09:51  Phos  1.9       06 Oct 2019 09:51  Mg     2.1       06 Oct 2019 09:51    MEDICATIONS  (STANDING):  artificial  tears Solution 1 Drop(s) Both EYES two times a day  ciprofloxacin  0.3% Ophthalmic Solution 1 Drop(s) Both EYES every 6 hours  docusate sodium 100 milliGRAM(s) Oral three times a day  donepezil 5 milliGRAM(s) Oral at bedtime  enoxaparin Injectable 30 milliGRAM(s) SubCutaneous daily  erythromycin   Ointment 1 Application(s) Both EYES four times a day  finasteride 5 milliGRAM(s) Oral daily  metoprolol succinate ER 25 milliGRAM(s) Oral daily  morphine  - Injectable 4 milliGRAM(s) IV Push once  multivitamin 1 Tablet(s) Oral daily  pantoprazole    Tablet 40 milliGRAM(s) Oral before breakfast  polyethylene glycol 3350 17 Gram(s) Oral daily  tamsulosin 0.4 milliGRAM(s) Oral at bedtime    MEDICATIONS  (PRN):  bisacodyl Suppository 10 milliGRAM(s) Rectal daily PRN If no bowel movement by POD#2  HYDROmorphone  Injectable 0.5 milliGRAM(s) IV Push every 3 hours PRN Severe Pain (7 - 10)  ondansetron Injectable 4 milliGRAM(s) IV Push every 6 hours PRN Nausea and/or Vomiting  oxyCODONE    IR 5 milliGRAM(s) Oral every 4 hours PRN Mild Pain (1 - 3)  oxyCODONE    IR 10 milliGRAM(s) Oral every 4 hours PRN Moderate Pain (4 - 6)  senna 2 Tablet(s) Oral at bedtime PRN Constipation      RADIOLOGY & ADDITIONAL TESTS:

## 2019-10-06 NOTE — PROGRESS NOTE ADULT - ASSESSMENT
89M pmh stage I diastolic hf, dementia, HTN, BPH presents after being found down, found to have hip fracture. Patient lives in [facility] and was seen for conjunctivitis. Pt discharged from ED back to group home. Pt is very poor historian despite use of ED . Unclear what happened after discharge, but was found down on ground by EMS. Pt only able to stay was walking across street when fell on right side and was unable to get up. Unable to offer any preceding events.     1)Right Femoral neck fracture presumably 2/2 mechanical fall  - s/p ORIF 10/2/19  - ortho following  - PT rec [facility]    2) Bilateral Conjunctivitis, bacterial, improving  - will c/w cipro gtt and erythromycin ointment    3) Leukocytosis likely 2/2 conjunctivitis & hip fracture: resolved    4) JESSE sec to obstructive uropathy  - Cr and BUN improving  - likely pre-renal, nephro fu appreciated  - fluids stopped    5) Hypertension, complicated by urgency, asymptomatic  - c/w home antihypertensive of metoprolol, titrate if serial bp remains elevated.     6) Chronic Diastolic CHF  - does no currently appear fluid overloaded.  - hold lasix in setting of jesse.    7) Dementia  - on fall precautions  - currently no agitated and redirectable.     8) BPH  - on finasteride and flomax    9) Hypernatremia. mild, asymptomatic  - improved  - off fluids    10) anemia, mild, asymptomatic, no acute/active s/s of blood loss, no baseline hgb    Dispo: Patient can go to [facility] now that Cr is improved

## 2019-10-07 PROCEDURE — 99232 SBSQ HOSP IP/OBS MODERATE 35: CPT

## 2019-10-07 RX ADMIN — PANTOPRAZOLE SODIUM 40 MILLIGRAM(S): 20 TABLET, DELAYED RELEASE ORAL at 05:17

## 2019-10-07 RX ADMIN — POLYETHYLENE GLYCOL 3350 17 GRAM(S): 17 POWDER, FOR SOLUTION ORAL at 12:01

## 2019-10-07 RX ADMIN — Medication 1 DROP(S): at 17:43

## 2019-10-07 RX ADMIN — Medication 100 MILLIGRAM(S): at 14:41

## 2019-10-07 RX ADMIN — Medication 100 MILLIGRAM(S): at 21:22

## 2019-10-07 RX ADMIN — Medication 1 DROP(S): at 21:23

## 2019-10-07 RX ADMIN — Medication 1 APPLICATION(S): at 05:18

## 2019-10-07 RX ADMIN — Medication 1 DROP(S): at 12:02

## 2019-10-07 RX ADMIN — DONEPEZIL HYDROCHLORIDE 5 MILLIGRAM(S): 10 TABLET, FILM COATED ORAL at 21:24

## 2019-10-07 RX ADMIN — FINASTERIDE 5 MILLIGRAM(S): 5 TABLET, FILM COATED ORAL at 12:02

## 2019-10-07 RX ADMIN — Medication 1 APPLICATION(S): at 00:38

## 2019-10-07 RX ADMIN — Medication 1 TABLET(S): at 12:03

## 2019-10-07 RX ADMIN — Medication 25 MILLIGRAM(S): at 05:17

## 2019-10-07 RX ADMIN — Medication 1 DROP(S): at 05:19

## 2019-10-07 RX ADMIN — Medication 1 DROP(S): at 00:38

## 2019-10-07 RX ADMIN — Medication 1 APPLICATION(S): at 21:23

## 2019-10-07 RX ADMIN — Medication 1 APPLICATION(S): at 17:43

## 2019-10-07 RX ADMIN — Medication 1 DROP(S): at 17:44

## 2019-10-07 RX ADMIN — ENOXAPARIN SODIUM 30 MILLIGRAM(S): 100 INJECTION SUBCUTANEOUS at 12:01

## 2019-10-07 RX ADMIN — Medication 100 MILLIGRAM(S): at 05:17

## 2019-10-07 RX ADMIN — Medication 1 DROP(S): at 05:18

## 2019-10-07 RX ADMIN — TAMSULOSIN HYDROCHLORIDE 0.4 MILLIGRAM(S): 0.4 CAPSULE ORAL at 21:22

## 2019-10-07 RX ADMIN — Medication 1 APPLICATION(S): at 12:03

## 2019-10-07 NOTE — PROGRESS NOTE ADULT - SUBJECTIVE AND OBJECTIVE BOX
CC: hip fracture (05 Oct 2019 20:20)    INTERVAL HPI/OVERNIGHT EVENTS:  patient without acute events  no complaints today    PHYSICAL EXAM:  General: in no acute distress  Eyes: PERRLA, EOMI; conjunctiva slightly erythematous but otherwise no purulence  Head: Normocephalic; atraumatic  ENMT: No nasal discharge; airway clear  Neck: Supple; non tender; no masses  Respiratory: No wheezes, rales or rhonchi  Cardiovascular: Regular rate and rhythm. S1 and S2 Normal; No murmurs, gallops or rubs  Gastrointestinal: Soft non-tender non-distended; Normal bowel sounds  Extremities: Normal range of motion, No clubbing, cyanosis or edema  Vascular: Peripheral pulses palpable 2+ bilaterally  Skin: Warm and dry. No acute rash  Psychiatric: Cooperative and appropriate    I&O's Detail    05 Oct 2019 07:01  -  06 Oct 2019 07:00  --------------------------------------------------------  IN:    sodium bicarbonate  Infusion: 320 mL    sodium chloride 0.9%: 800 mL  Total IN: 1120 mL    OUT:    Indwelling Catheter - Urethral: 500 mL  Total OUT: 500 mL    Total NET: 620 mL    06 Oct 2019 07:01  -  06 Oct 2019 14:41  --------------------------------------------------------  IN:    sodium bicarbonate  Infusion: 240 mL  Total IN: 240 mL    OUT:    Indwelling Catheter - Urethral: 350 mL  Total OUT: 350 mL    Total NET: -110 mL                        12.6   9.30  )-----------( 205      ( 06 Oct 2019 13:05 )             39.4     06 Oct 2019 09:51    140    |  104    |  31.0   ----------------------------<  148    4.6     |  23.0   |  1.19     Ca    8.2        06 Oct 2019 09:51  Phos  1.9       06 Oct 2019 09:51  Mg     2.1       06 Oct 2019 09:51    MEDICATIONS  (STANDING):  artificial  tears Solution 1 Drop(s) Both EYES two times a day  ciprofloxacin  0.3% Ophthalmic Solution 1 Drop(s) Both EYES every 6 hours  docusate sodium 100 milliGRAM(s) Oral three times a day  donepezil 5 milliGRAM(s) Oral at bedtime  enoxaparin Injectable 30 milliGRAM(s) SubCutaneous daily  erythromycin   Ointment 1 Application(s) Both EYES four times a day  finasteride 5 milliGRAM(s) Oral daily  metoprolol succinate ER 25 milliGRAM(s) Oral daily  morphine  - Injectable 4 milliGRAM(s) IV Push once  multivitamin 1 Tablet(s) Oral daily  pantoprazole    Tablet 40 milliGRAM(s) Oral before breakfast  polyethylene glycol 3350 17 Gram(s) Oral daily  tamsulosin 0.4 milliGRAM(s) Oral at bedtime    MEDICATIONS  (PRN):  bisacodyl Suppository 10 milliGRAM(s) Rectal daily PRN If no bowel movement by POD#2  HYDROmorphone  Injectable 0.5 milliGRAM(s) IV Push every 3 hours PRN Severe Pain (7 - 10)  ondansetron Injectable 4 milliGRAM(s) IV Push every 6 hours PRN Nausea and/or Vomiting  oxyCODONE    IR 5 milliGRAM(s) Oral every 4 hours PRN Mild Pain (1 - 3)  oxyCODONE    IR 10 milliGRAM(s) Oral every 4 hours PRN Moderate Pain (4 - 6)  senna 2 Tablet(s) Oral at bedtime PRN Constipation      RADIOLOGY & ADDITIONAL TESTS:

## 2019-10-07 NOTE — PROGRESS NOTE ADULT - REASON FOR ADMISSION
hip fracture

## 2019-10-07 NOTE — PROGRESS NOTE ADULT - ASSESSMENT
89M pmh stage I diastolic hf, dementia, HTN, BPH presents after being found down, found to have hip fracture. Patient lives in [facility] and was seen for conjunctivitis. Pt discharged from ED back to group home. Pt is very poor historian despite use of ED . Unclear what happened after discharge, but was found down on ground by EMS. Pt only able to stay was walking across street when fell on right side and was unable to get up. Unable to offer any preceding events.     1)Right Femoral neck fracture presumably 2/2 mechanical fall  - s/p ORIF 10/2/19  - ortho following  - PT rec [facility]    2) Bilateral Conjunctivitis, bacterial, improving  - will c/w cipro gtt and erythromycin ointment    3) Leukocytosis likely 2/2 conjunctivitis & hip fracture: resolved    4) JESSE sec to obstructive uropathy  - Cr and BUN improving  - likely pre-renal, nephro fu appreciated  - fluids stopped    5) Hypertension, complicated by urgency, asymptomatic  - c/w home antihypertensive of metoprolol, titrate if serial bp remains elevated.     6) Chronic Diastolic CHF  - does no currently appear fluid overloaded.  - hold lasix in setting of jesse.    7) Dementia  - on fall precautions  - currently no agitated and redirectable.     8) BPH  - on finasteride and flomax    9) Hypernatremia. mild, asymptomatic  - improved  - off fluids    10) anemia, mild, asymptomatic, no acute/active s/s of blood loss, no baseline hgb    Dispo: Patient can go to [facility] now that Cr is improved

## 2019-10-08 ENCOUNTER — TRANSCRIPTION ENCOUNTER (OUTPATIENT)
Age: 84
End: 2019-10-08

## 2019-10-08 VITALS
SYSTOLIC BLOOD PRESSURE: 140 MMHG | DIASTOLIC BLOOD PRESSURE: 70 MMHG | RESPIRATION RATE: 16 BRPM | OXYGEN SATURATION: 92 % | TEMPERATURE: 98 F | HEART RATE: 77 BPM

## 2019-10-08 PROCEDURE — 85610 PROTHROMBIN TIME: CPT

## 2019-10-08 PROCEDURE — 99285 EMERGENCY DEPT VISIT HI MDM: CPT

## 2019-10-08 PROCEDURE — 86901 BLOOD TYPING SEROLOGIC RH(D): CPT

## 2019-10-08 PROCEDURE — C1776: CPT

## 2019-10-08 PROCEDURE — 80053 COMPREHEN METABOLIC PANEL: CPT

## 2019-10-08 PROCEDURE — 84133 ASSAY OF URINE POTASSIUM: CPT

## 2019-10-08 PROCEDURE — 99283 EMERGENCY DEPT VISIT LOW MDM: CPT

## 2019-10-08 PROCEDURE — 73502 X-RAY EXAM HIP UNI 2-3 VIEWS: CPT

## 2019-10-08 PROCEDURE — 85730 THROMBOPLASTIN TIME PARTIAL: CPT

## 2019-10-08 PROCEDURE — 86900 BLOOD TYPING SEROLOGIC ABO: CPT

## 2019-10-08 PROCEDURE — 97530 THERAPEUTIC ACTIVITIES: CPT

## 2019-10-08 PROCEDURE — T1013: CPT

## 2019-10-08 PROCEDURE — 73501 X-RAY EXAM HIP UNI 1 VIEW: CPT

## 2019-10-08 PROCEDURE — 76770 US EXAM ABDO BACK WALL COMP: CPT

## 2019-10-08 PROCEDURE — 36415 COLL VENOUS BLD VENIPUNCTURE: CPT

## 2019-10-08 PROCEDURE — 71045 X-RAY EXAM CHEST 1 VIEW: CPT

## 2019-10-08 PROCEDURE — 99239 HOSP IP/OBS DSCHRG MGMT >30: CPT

## 2019-10-08 PROCEDURE — 84540 ASSAY OF URINE/UREA-N: CPT

## 2019-10-08 PROCEDURE — 84100 ASSAY OF PHOSPHORUS: CPT

## 2019-10-08 PROCEDURE — 83935 ASSAY OF URINE OSMOLALITY: CPT

## 2019-10-08 PROCEDURE — 85027 COMPLETE CBC AUTOMATED: CPT

## 2019-10-08 PROCEDURE — 83735 ASSAY OF MAGNESIUM: CPT

## 2019-10-08 PROCEDURE — 84300 ASSAY OF URINE SODIUM: CPT

## 2019-10-08 PROCEDURE — 97163 PT EVAL HIGH COMPLEX 45 MIN: CPT

## 2019-10-08 PROCEDURE — 76377 3D RENDER W/INTRP POSTPROCES: CPT

## 2019-10-08 PROCEDURE — 97167 OT EVAL HIGH COMPLEX 60 MIN: CPT

## 2019-10-08 PROCEDURE — 72192 CT PELVIS W/O DYE: CPT

## 2019-10-08 PROCEDURE — 73552 X-RAY EXAM OF FEMUR 2/>: CPT

## 2019-10-08 PROCEDURE — 81001 URINALYSIS AUTO W/SCOPE: CPT

## 2019-10-08 PROCEDURE — 97116 GAIT TRAINING THERAPY: CPT

## 2019-10-08 PROCEDURE — 80048 BASIC METABOLIC PNL TOTAL CA: CPT

## 2019-10-08 PROCEDURE — 86850 RBC ANTIBODY SCREEN: CPT

## 2019-10-08 PROCEDURE — 82962 GLUCOSE BLOOD TEST: CPT

## 2019-10-08 PROCEDURE — 70450 CT HEAD/BRAIN W/O DYE: CPT

## 2019-10-08 RX ADMIN — Medication 1 DROP(S): at 05:51

## 2019-10-08 RX ADMIN — Medication 1 TABLET(S): at 11:40

## 2019-10-08 RX ADMIN — Medication 1 DROP(S): at 11:41

## 2019-10-08 RX ADMIN — POLYETHYLENE GLYCOL 3350 17 GRAM(S): 17 POWDER, FOR SOLUTION ORAL at 11:41

## 2019-10-08 RX ADMIN — Medication 1 APPLICATION(S): at 05:51

## 2019-10-08 RX ADMIN — Medication 1 DROP(S): at 05:52

## 2019-10-08 RX ADMIN — Medication 25 MILLIGRAM(S): at 05:52

## 2019-10-08 RX ADMIN — ENOXAPARIN SODIUM 30 MILLIGRAM(S): 100 INJECTION SUBCUTANEOUS at 11:41

## 2019-10-08 RX ADMIN — PANTOPRAZOLE SODIUM 40 MILLIGRAM(S): 20 TABLET, DELAYED RELEASE ORAL at 05:52

## 2019-10-08 RX ADMIN — Medication 1 APPLICATION(S): at 11:41

## 2019-10-08 RX ADMIN — FINASTERIDE 5 MILLIGRAM(S): 5 TABLET, FILM COATED ORAL at 11:40

## 2019-10-08 NOTE — DISCHARGE NOTE NURSING/CASE MANAGEMENT/SOCIAL WORK - PATIENT PORTAL LINK FT
You can access the FollowMyHealth Patient Portal offered by [facility] by registering at the following website: [email or web address]. By joining [facility]’s FollowMyHealth portal, you will also be able to view your health information using other applications (apps) compatible with our system.

## 2019-10-10 PROBLEM — I50.9 HEART FAILURE, UNSPECIFIED: Chronic | Status: ACTIVE | Noted: 2017-08-23

## 2019-10-11 NOTE — PATIENT PROFILE ADULT - NSPRESCRALCFREQ_GEN_A_NUR
"Lung sounds CTA bilaterally. Heart sounds normal.    Refer to paper documentation scanned into electronic health record under \"Media.\"  " pt unable to comprehend

## 2019-10-17 ENCOUNTER — APPOINTMENT (OUTPATIENT)
Dept: ORTHOPEDIC SURGERY | Facility: CLINIC | Age: 84
End: 2019-10-17
Payer: MEDICARE

## 2019-10-17 PROCEDURE — 73502 X-RAY EXAM HIP UNI 2-3 VIEWS: CPT | Mod: RT,TC

## 2019-10-17 PROCEDURE — 99024 POSTOP FOLLOW-UP VISIT: CPT

## 2019-10-17 NOTE — HISTORY OF PRESENT ILLNESS
[Clean/Dry/Intact] : clean, dry and intact [Neuro Intact] : an unremarkable neurological exam [Vascular Intact] : ~T peripheral vascular exam normal [Xray (Date:___)] : [unfilled] Xray -  [Hardware in Good Position] : hardware in good position [Doing Well] : is doing well [No Sign of Infection] : is showing no signs of infection [Adequate Pain Control] : has adequate pain control [Erythema] : not erythematous [Swelling] : not swollen [Discharge] : absent of discharge [de-identified] : right hip Hemiarthroplasty  DOS 10/02/19 [Dehiscence] : not dehisced [de-identified] : 89-year-old male status post right hip hemiarthroplasty on October 2, 2019  for right hip fracture.  Patient is currently at a rehabilitation center and is receiving physical therapy. He is weightbearing as tolerated. He denies pain at this time. He is accompanied by an aid.   [de-identified] : Able to stand with assistance [de-identified] : x-ray Right hip pelvis 3 views [de-identified] : patient should continue with physical therapy for right posterior hip precautions, weightbearing as tolerated. Rehabilitation should call our office if there are any concerns.If he has no pain and is progressing with physical therapy he can return on a p.r.n. basis, otherwise if he has any pain or concerns he should return in 4-6 weeks  for Evaluation and x-rays of right hip/ pelvis.

## 2019-12-10 ENCOUNTER — APPOINTMENT (OUTPATIENT)
Dept: ORTHOPEDIC SURGERY | Facility: CLINIC | Age: 84
End: 2019-12-10
Payer: MEDICARE

## 2019-12-10 ENCOUNTER — OTHER (OUTPATIENT)
Age: 84
End: 2019-12-10

## 2019-12-10 DIAGNOSIS — Z87.81 PERSONAL HISTORY OF (HEALED) TRAUMATIC FRACTURE: ICD-10-CM

## 2019-12-10 PROCEDURE — 99024 POSTOP FOLLOW-UP VISIT: CPT

## 2019-12-10 PROCEDURE — 73502 X-RAY EXAM HIP UNI 2-3 VIEWS: CPT | Mod: RT

## 2019-12-10 NOTE — HISTORY OF PRESENT ILLNESS
[Chills] : no chills [Constipation] : no constipation [0] : no pain reported [Diarrhea] : no diarrhea [Dysuria] : no dysuria [Vomiting] : no vomiting [Nausea] : no nausea [Fever] : no fever [Doing Well] : is doing well [Healed] : healed [Clean/Dry/Intact] : clean, dry and intact [No Sign of Infection] : is showing no signs of infection [Excellent Pain Control] : has excellent pain control [de-identified] : s/p right hip hemiarthroplasty 10/2/19 [de-identified] : the patient is a 89-year-old male who presents today for routine followup s/p right hip hemiarthroplasty 10/2/19. He has no complaints of pain currently. His health care aid states he is in his usual state of health. [de-identified] : Physical Exam:\par General: Well appearing, no acute distress, Alert\par Neurologic: Alert, No focal deficits\par Head: NCAT without abrasions, lacerations, or ecchymosis to head, face, or scalp\par Respiratory: Equal chest wall expansion bilaterally, no accessory muscle use\par Lymphatic: No lymphadenopathy palpated\par Skin: Warm and dry\par Psychiatric: Demented [de-identified] : He is doing well today. No complaints of pain. Continue weightbearing as tolerated. Posterior hip precautions. He may followup p.r.n.\par \par Osteopenia and osteoporosis are significant risk factors for fragility fractures. Given the type of fracture that has occurred, I would highly recommend that the patient followup with their primary care physician to discuss treatment for low bone mineral density. We discussed the benefits of these medications frequently far outweigh the small risks. Their primary care physician can call the office with any specific questions or concerns.\par \par Al Diane MD\par Orthopaedic Trauma Surgeon\par [facility]\par [facility] [place]\par \par \par \par  [de-identified] : Plain films of the right hip and pelvis were obtained today. They show a stable hemiarthroplasty

## 2019-12-13 NOTE — PHYSICAL THERAPY INITIAL EVALUATION ADULT - LEVEL OF INDEPENDENCE: STAND/SIT, REHAB EVAL
[facility], Emergency Department  [address]  [facility] [place] [place] [address]  Phone:  [phone]  Fax:  [phone]                                    Nevin Alvarado   MRN: [id number]    Department:  [facility], Emergency Department   Date of Visit:  12/13/2019           After Visit Summary Signature Page    I have received my discharge instructions, and my questions have been answered. I have discussed any challenges I see with this plan with the nurse or doctor.    ..........................................................................................................................................  Patient/Patient Representative Signature      ..........................................................................................................................................  Patient Representative Print Name and Relationship to Patient    ..................................................               ................................................  Date                                   Time    ..........................................................................................................................................  Reviewed by Signature/Title    ...................................................              ..............................................  Date                                               Time          22EPIC Rev 08/18       
moderate assist (50% patients effort)

## 2020-04-13 NOTE — PHYSICAL THERAPY INITIAL EVALUATION ADULT - THERAPY FREQUENCY, PT EVAL
Stephanie, Home Care nurse, calling requesting medication refill for patient for coumadin.  Prescription approved per [facility] Refill Protocol.  Arely Marino, RN  Anticoagulation Nurse - [facility], [place]      Anticoagulation Monitoring Instructions   Latest Ref Rng & Units    4/13/2020 2 mg every Mon, Wed, Fri; 3 mg all other days      3-5x/week

## 2020-06-09 NOTE — OCCUPATIONAL THERAPY INITIAL EVALUATION ADULT - PHYSICAL ASSIST/NONPHYSICAL ASSIST: GROOMING, OT EVAL
Intubated in trauma bay for extreme agitation with potential for harm to self and others.  6/10 Extubated  6/12 RA 99%   Tolerating well   1 person assist

## 2021-06-04 ENCOUNTER — EMERGENCY (EMERGENCY)
Facility: HOSPITAL | Age: 86
LOS: 1 days | Discharge: HOME | End: 2021-06-04
Attending: STUDENT IN AN ORGANIZED HEALTH CARE EDUCATION/TRAINING PROGRAM
Payer: MEDICARE

## 2021-06-04 VITALS
OXYGEN SATURATION: 98 % | DIASTOLIC BLOOD PRESSURE: 70 MMHG | TEMPERATURE: 98 F | SYSTOLIC BLOOD PRESSURE: 129 MMHG | RESPIRATION RATE: 16 BRPM | HEART RATE: 55 BPM

## 2021-06-04 VITALS
RESPIRATION RATE: 18 BRPM | SYSTOLIC BLOOD PRESSURE: 123 MMHG | HEIGHT: 69 IN | DIASTOLIC BLOOD PRESSURE: 64 MMHG | HEART RATE: 57 BPM | OXYGEN SATURATION: 96 %

## 2021-06-04 DIAGNOSIS — Z78.9 OTHER SPECIFIED HEALTH STATUS: Chronic | ICD-10-CM

## 2021-06-04 DIAGNOSIS — Z98.890 OTHER SPECIFIED POSTPROCEDURAL STATES: Chronic | ICD-10-CM

## 2021-06-04 LAB
ALBUMIN SERPL ELPH-MCNC: 3.7 G/DL — SIGNIFICANT CHANGE UP (ref 3.3–5.2)
ALP SERPL-CCNC: 107 U/L — SIGNIFICANT CHANGE UP (ref 40–120)
ALT FLD-CCNC: 10 U/L — SIGNIFICANT CHANGE UP
ANION GAP SERPL CALC-SCNC: 7 MMOL/L — SIGNIFICANT CHANGE UP (ref 5–17)
APTT BLD: 35.4 SEC — SIGNIFICANT CHANGE UP (ref 27.5–35.5)
AST SERPL-CCNC: 21 U/L — SIGNIFICANT CHANGE UP
BASOPHILS # BLD AUTO: 0.02 K/UL — SIGNIFICANT CHANGE UP (ref 0–0.2)
BASOPHILS NFR BLD AUTO: 0.3 % — SIGNIFICANT CHANGE UP (ref 0–2)
BILIRUB SERPL-MCNC: 0.5 MG/DL — SIGNIFICANT CHANGE UP (ref 0.4–2)
BUN SERPL-MCNC: 20.9 MG/DL — HIGH (ref 8–20)
CALCIUM SERPL-MCNC: 9.2 MG/DL — SIGNIFICANT CHANGE UP (ref 8.6–10.2)
CHLORIDE SERPL-SCNC: 106 MMOL/L — SIGNIFICANT CHANGE UP (ref 98–107)
CO2 SERPL-SCNC: 27 MMOL/L — SIGNIFICANT CHANGE UP (ref 22–29)
CREAT SERPL-MCNC: 1.4 MG/DL — HIGH (ref 0.5–1.3)
EOSINOPHIL # BLD AUTO: 0.05 K/UL — SIGNIFICANT CHANGE UP (ref 0–0.5)
EOSINOPHIL NFR BLD AUTO: 0.7 % — SIGNIFICANT CHANGE UP (ref 0–6)
GLUCOSE SERPL-MCNC: 93 MG/DL — SIGNIFICANT CHANGE UP (ref 70–99)
HCT VFR BLD CALC: 42.1 % — SIGNIFICANT CHANGE UP (ref 39–50)
HGB BLD-MCNC: 13.9 G/DL — SIGNIFICANT CHANGE UP (ref 13–17)
IMM GRANULOCYTES NFR BLD AUTO: 0.4 % — SIGNIFICANT CHANGE UP (ref 0–1.5)
INR BLD: 1.09 RATIO — SIGNIFICANT CHANGE UP (ref 0.88–1.16)
LYMPHOCYTES # BLD AUTO: 0.89 K/UL — LOW (ref 1–3.3)
LYMPHOCYTES # BLD AUTO: 12.8 % — LOW (ref 13–44)
MCHC RBC-ENTMCNC: 33 GM/DL — SIGNIFICANT CHANGE UP (ref 32–36)
MCHC RBC-ENTMCNC: 34.3 PG — HIGH (ref 27–34)
MCV RBC AUTO: 104 FL — HIGH (ref 80–100)
MONOCYTES # BLD AUTO: 0.65 K/UL — SIGNIFICANT CHANGE UP (ref 0–0.9)
MONOCYTES NFR BLD AUTO: 9.3 % — SIGNIFICANT CHANGE UP (ref 2–14)
NEUTROPHILS # BLD AUTO: 5.33 K/UL — SIGNIFICANT CHANGE UP (ref 1.8–7.4)
NEUTROPHILS NFR BLD AUTO: 76.5 % — SIGNIFICANT CHANGE UP (ref 43–77)
PLATELET # BLD AUTO: 183 K/UL — SIGNIFICANT CHANGE UP (ref 150–400)
POTASSIUM SERPL-MCNC: 4.2 MMOL/L — SIGNIFICANT CHANGE UP (ref 3.5–5.3)
POTASSIUM SERPL-SCNC: 4.2 MMOL/L — SIGNIFICANT CHANGE UP (ref 3.5–5.3)
PROT SERPL-MCNC: 7 G/DL — SIGNIFICANT CHANGE UP (ref 6.6–8.7)
PROTHROM AB SERPL-ACNC: 12.6 SEC — SIGNIFICANT CHANGE UP (ref 10.6–13.6)
RBC # BLD: 4.05 M/UL — LOW (ref 4.2–5.8)
RBC # FLD: 12.2 % — SIGNIFICANT CHANGE UP (ref 10.3–14.5)
SARS-COV-2 RNA SPEC QL NAA+PROBE: SIGNIFICANT CHANGE UP
SODIUM SERPL-SCNC: 140 MMOL/L — SIGNIFICANT CHANGE UP (ref 135–145)
WBC # BLD: 6.97 K/UL — SIGNIFICANT CHANGE UP (ref 3.8–10.5)
WBC # FLD AUTO: 6.97 K/UL — SIGNIFICANT CHANGE UP (ref 3.8–10.5)

## 2021-06-04 PROCEDURE — 99283 EMERGENCY DEPT VISIT LOW MDM: CPT

## 2021-06-04 PROCEDURE — 93005 ELECTROCARDIOGRAM TRACING: CPT

## 2021-06-04 PROCEDURE — 86900 BLOOD TYPING SEROLOGIC ABO: CPT

## 2021-06-04 PROCEDURE — 85730 THROMBOPLASTIN TIME PARTIAL: CPT

## 2021-06-04 PROCEDURE — 85610 PROTHROMBIN TIME: CPT

## 2021-06-04 PROCEDURE — 72125 CT NECK SPINE W/O DYE: CPT

## 2021-06-04 PROCEDURE — 71045 X-RAY EXAM CHEST 1 VIEW: CPT | Mod: 26

## 2021-06-04 PROCEDURE — 99291 CRITICAL CARE FIRST HOUR: CPT | Mod: 25

## 2021-06-04 PROCEDURE — 85025 COMPLETE CBC W/AUTO DIFF WBC: CPT

## 2021-06-04 PROCEDURE — 99291 CRITICAL CARE FIRST HOUR: CPT

## 2021-06-04 PROCEDURE — 70450 CT HEAD/BRAIN W/O DYE: CPT | Mod: 26,MA,77

## 2021-06-04 PROCEDURE — 93010 ELECTROCARDIOGRAM REPORT: CPT

## 2021-06-04 PROCEDURE — U0005: CPT

## 2021-06-04 PROCEDURE — 80053 COMPREHEN METABOLIC PANEL: CPT

## 2021-06-04 PROCEDURE — 70450 CT HEAD/BRAIN W/O DYE: CPT

## 2021-06-04 PROCEDURE — 36415 COLL VENOUS BLD VENIPUNCTURE: CPT

## 2021-06-04 PROCEDURE — 72125 CT NECK SPINE W/O DYE: CPT | Mod: 26,MH

## 2021-06-04 PROCEDURE — 86901 BLOOD TYPING SEROLOGIC RH(D): CPT

## 2021-06-04 PROCEDURE — 70450 CT HEAD/BRAIN W/O DYE: CPT | Mod: 26,MH

## 2021-06-04 PROCEDURE — U0003: CPT

## 2021-06-04 PROCEDURE — 86850 RBC ANTIBODY SCREEN: CPT

## 2021-06-04 PROCEDURE — 71045 X-RAY EXAM CHEST 1 VIEW: CPT

## 2021-06-04 PROCEDURE — 82962 GLUCOSE BLOOD TEST: CPT

## 2021-06-04 NOTE — ED ADULT TRIAGE NOTE - CHIEF COMPLAINT QUOTE
pt a+ox3, BIBA s/p trip and fall. denies hitting head or LOC, denies daily blood thinners. pt offers no complaints at this time.

## 2021-06-04 NOTE — ED ADULT NURSE NOTE - OBJECTIVE STATEMENT
called to bedside. 89 y/o m disoriented comes to the ed stating the  brought him in.  pt. unsure why he is here.

## 2021-06-04 NOTE — CHART NOTE - NSCHARTNOTEFT_GEN_A_CORE
ETELVINA Note: SW received call from ED provider that pt is medically cleared for d/c, pt resides at [facility]. ED provider pt has severe dementia and is A&Ox1 ETELVINA placed call to [facility], spoke to house staff Abebe, made her aware pt will be d/c back home tonight. ETELVINA offered to fax docs however per Abebe fax not working, ETELVINA made Abebe aware pt will come with hard copy of d/c docs, ETELVINA arranged [facility] via [facility] ([facility]), CALISTA left on A-side, no further SW services identified

## 2021-06-04 NOTE — ED PROVIDER NOTE - PMH
Blindness of one eye    CHF (congestive heart failure)  stage I diastolic  Dementia    Hypertension    Poor historian     Blindness of one eye    BPH (benign prostatic hyperplasia)    CHF (congestive heart failure)  stage I diastolic  Dementia    Hypertension    Poor historian

## 2021-06-04 NOTE — CONSULT NOTE ADULT - SUBJECTIVE AND OBJECTIVE BOX
Patient is a 89y old  Male who presents with a chief complaint of fall  HPI: per chart this is a 89M with dementia who lives in [facility] s/p trip and fall. Patient offers no complaints but has severe dementia and is only oriented to self. No AC/AP use per chart review. Denies headache, nausea, vomiting, blurry vision, or pain.     PAST MEDICAL & SURGICAL HISTORY:  Poor historian    Hypertension    Dementia    CHF (congestive heart failure)  stage I diastolic    Blindness of one eye    BPH (benign prostatic hyperplasia)    S/P ORIF (open reduction internal fixation) fracture  right hip      FAMILY HISTORY:  No pertinent family history in first degree relatives    SOCIAL HISTORY:  Tobacco Use: Denies   EtOH use: Denies   Substance: Denies     Allergies    No Known Allergies    Intolerances    REVIEW OF SYSTEMS  Negative except as noted in HPI  CONSTITUTIONAL: No fever, weight loss, or fatigue  EYES: No eye pain, visual disturbances, or discharge  ENMT:  No difficulty hearing, tinnitus, vertigo; No sinus or throat pain  NECK: No pain or stiffness  BREASTS: No pain, masses, or nipple discharge  RESPIRATORY: No cough, wheezing, chills or hemoptysis; No shortness of breath  CARDIOVASCULAR: No chest pain, palpitations, dizziness, or leg swelling  GASTROINTESTINAL: No abdominal or epigastric pain. No nausea, vomiting, or hematemesis; No diarrhea or constipation. No melena or hematochezia.  GENITOURINARY: No dysuria, frequency, hematuria, or incontinence  NEUROLOGICAL: No headaches, memory loss, loss of strength, numbness, or tremors  SKIN: No itching, burning, rashes, or lesions   LYMPH NODES: No enlarged glands  ENDOCRINE: No heat or cold intolerance; No hair loss  MUSCULOSKELETAL: No joint pain or swelling; No muscle, back, or extremity pain  PSYCHIATRIC: No depression, anxiety, mood swings, or difficulty sleeping  HEME/LYMPH: No easy bruising, or bleeding gums  ALLERY AND IMMUNOLOGIC: No hives or eczema    HOME MEDICATIONS:  Home Medications:  acetaminophen 325 mg oral tablet: 2 tab(s) orally every 6 hours (04 Oct 2019 11:01)  aluminum hydroxide-magnesium hydroxide 200 mg-200 mg/5 mL oral suspension: 30 milliliter(s) orally 4 times a day, As needed, Indigestion (04 Oct 2019 11:04)  calcium-vitamin D 500 mg-200 intl units oral tablet: 1 tab(s) orally 3 times a day (04 Oct 2019 11:05)  Colace 100 mg oral capsule: 1 cap(s) orally once a day (02 Oct 2019 02:26)  donepezil 5 mg oral tablet: 1 tab(s) orally once a day (at bedtime) (02 Oct 2019 02:26)  finasteride 5 mg oral tablet: 1 tab(s) orally once a day (02 Oct 2019 02:26)  magnesium hydroxide 8% oral suspension: 30 milliliter(s) orally 2 times a day, As needed, Constipation (04 Oct 2019 11:04)  metoprolol succinate 25 mg oral tablet, extended release: 1 tab(s) orally once a day (02 Oct 2019 02:26)  Multiple Vitamins oral tablet: 1 tab(s) orally once a day (04 Oct 2019 11:05)  pantoprazole 40 mg oral delayed release tablet: 1 tab(s) orally once a day (before a meal) (04 Oct 2019 11:05)  ramipril 5 mg oral capsule: 1 cap(s) orally once a day (02 Oct 2019 02:26)  Restasis 0.05% ophthalmic emulsion: 1 drop(s) to each affected eye every 12 hours (02 Oct 2019 02:26)  tamsulosin 0.4 mg oral capsule: 1 cap(s) orally once a day (at bedtime) (02 Oct 2019 02:26)      MEDICATIONS:  Antibiotics:    Neuro:    Anticoagulation:    OTHER:    IVF:      Vital Signs Last 24 Hrs  T(C): 36.4 (04 Jun 2021 11:16), Max: 36.4 (04 Jun 2021 11:16)  T(F): 97.6 (04 Jun 2021 11:16), Max: 97.6 (04 Jun 2021 11:16)  HR: 64 (04 Jun 2021 11:16) (57 - 64)  BP: 149/75 (04 Jun 2021 11:16) (123/64 - 149/75)  BP(mean): --  RR: 18 (04 Jun 2021 11:16) (18 - 18)  SpO2: 97% (04 Jun 2021 11:16) (96% - 97%)      PHYSICAL EXAM:  GENERAL: NAD, well-groomed, well-developed  HEAD:  Atraumatic, normocephalic  EYES: Right eye with cataracts ?surgical pupil, left eye pupil reactive   ENMT: No tonsillar erythema, exudates, or enlargement; moist mucous membranes, good dentition, no lesions  NECK: Supple, no JVD, normal thyroid  KIMBERLEE COMA SCORE: E-4 V-4 M-6 = 14  MENTAL STATUS: AAO x person only; Awake; Opens eyes spontaneously, conversant but confused, following simple commands  CRANIAL NERVES: unable to cooperate with visual exam, left pupil reactive, right with cataracts / ?surgical pupil. EOMI without nystagmus. Facial sensation intact V1-3 distribution b/l. Face symmetric w/ normal eye closure and smile, tongue midline. Hearing grossly intact. Speech clear. Head turning and shoulder shrug intact.   MOTOR: strength 5/5 b/l upper and lower extremities  SENSATION: grossly intact to light touch all extremities  COORDINATION: rapid alternating movements intact b/l upper extremities; no upper extremity dysmetria  MUSCLE STRETCH REFLEXES: DTRs 2+ intact and symmetric    CHEST/LUNG: Clear to auscultation bilaterally; no rales, rhonchi, wheezing, or rubs  HEART: +S1/+S2; Regular rate and rhythm; no murmurs, rubs, or gallops  ABDOMEN: Soft, nontender, nondistended; bowel sounds present all four quadrants  EXTREMITIES:  2+ peripheral pulses, no clubbing, cyanosis, or edema  LYMPH: No lymphadenopathy noted  SKIN: Warm, dry; no rashes or lesions    LABS: Pending    RADIOLOGY & ADDITIONAL STUDIES:  < from: CT Head No Cont (06.04.21 @ 10:44) >  IMPRESSION:    CT head:  1.  Two small foci of hyperdensity along the left frontal parietal convexity (6:44, 42) (8:42), new since 2019, suspicious for subarachnoid hemorrhage.  2.  Stable left posterior parafalcine meningioma. Stable possible second meningioma abutting the left superior sagittal sinus.    CT cervical spine:  1.  No evidence for acute displaced fracture.  2.  C1 is rotated relative to C2, which could be due to patient positioning.    < end of copied text >      CAPRINI SCORE [CLOT]:  Patient has an estimated Caprini score of greater than 5.  However, the patient's unique clinical situation will be addressed in an individual manner to determine appropriate anticoagulation treatment, if any.

## 2021-06-04 NOTE — ED PROVIDER NOTE - CLINICAL SUMMARY MEDICAL DECISION MAKING FREE TEXT BOX
Pt is 89y male with dementia, BIBA from [facility] s/p fall. No acute complaints.     Head CT Pt is 89y male with dementia, BIBA from [facility] s/p fall. No acute complaints, no signs of trauma. Will obtain CT head/cspine, reassess.

## 2021-06-04 NOTE — CONSULT NOTE ADULT - ASSESSMENT
89M s/p fall with possible focus of SAH     Plan   - q2 neuro checks   - Repeat CT head at 5pm   - Dispo pending repeat CT head   - No AC/AP at this time

## 2021-06-04 NOTE — ED ADULT NURSE NOTE - NSIMPLEMENTINTERV_GEN_ALL_ED
Implemented All Fall with Harm Risk Interventions:  [place] to call system. Call bell, personal items and telephone within reach. Instruct patient to call for assistance. Room bathroom lighting operational. Non-slip footwear when patient is off stretcher. Physically safe environment: no spills, clutter or unnecessary equipment. Stretcher in lowest position, wheels locked, appropriate side rails in place. Provide visual cue, wrist band, yellow gown, etc. Monitor gait and stability. Monitor for mental status changes and reorient to person, place, and time. Review medications for side effects contributing to fall risk. Reinforce activity limits and safety measures with patient and family. Provide visual clues: red socks.

## 2021-06-04 NOTE — ED PROVIDER NOTE - ATTENDING CONTRIBUTION TO CARE
I performed a face to face history and physical exam of the patient and discussed their management with the student/resident/ACP. I reviewed the student/resident/ACP's note and agree with the documented findings and plan of care.    Pt with a hx of dementia brought in from [facility] after a fall. Pt unable to provide clear hx 2/2 dementia.  Pt with no complaints.    physical - rrr. ctab. abd - soft, nt. no edema. no rash. normal ROM of hips B/L.    plan - will check CT head and c-spine and if neg plan to d/c back to adult home. I performed a face to face history and physical exam of the patient and discussed their management with the student/resident/ACP. I reviewed the student/resident/ACP's note and agree with the documented findings and plan of care.    Pt with a hx of dementia brought in from [facility] after a fall. Pt unable to provide clear hx 2/2 dementia.  Pt with no complaints.    physical - rrr. ctab. abd - soft, nt. no edema. no rash. normal ROM of hips B/L.    plan - will check CT head and c-spine and if neg plan to d/c back to adult home.    initial CT with concern for small SAH. neurosurgery recommended repeat CT after 6 h. repeat CT done and shows decrease in size of possible SAH. Case d/w neurosurgery and recommended discharge with outpatient f/up.

## 2021-06-04 NOTE — ED PROVIDER NOTE - CARE PLAN
Assessment and plan of treatment:	Pt is 89y male with Dementia, BIBA from [facility] s/p fall. No acute complaints.   Principal Discharge DX:	Subarachnoid hemorrhage

## 2021-06-04 NOTE — ED PROVIDER NOTE - OBJECTIVE STATEMENT
Pt 89ymale w/ Dementia, BIBA from [facility] s/p trip and fall. No complaints. Pt 89ymale w/ Dementia, BIBA from [facility] s/p apparent trip and fall. No complaints. Pt unable to provide further hx due to dementia.

## 2021-06-04 NOTE — ED ADULT NURSE REASSESSMENT NOTE - NS ED NURSE REASSESS COMMENT FT1
called to bedside. pt. not answering questions appropriately. trying to re-orient pt. to stay in bed.

## 2021-06-04 NOTE — ED PROVIDER NOTE - PATIENT PORTAL LINK FT
You can access the FollowMyHealth Patient Portal offered by [facility] by registering at the following website: [email or web address]. By joining [employer]’s FollowMyHealth portal, you will also be able to view your health information using other applications (apps) compatible with our system.

## 2021-06-04 NOTE — CHART NOTE - NSCHARTNOTEFT_GEN_A_CORE
Repeat CT scan reviewed, discussed with Dr. Oliveira. [place] is [place] [facility] [place]

## 2021-06-07 NOTE — PROGRESS NOTE ADULT - SUBJECTIVE AND OBJECTIVE BOX
How Severe Is Your Skin Lesion?: mild Have Your Skin Lesions Been Treated?: not been treated ORTHO-POST-OP PROGRESS NOTE:  [id number]    JANAY NANDO    PROCEDURE: Right hip Hemiarthroplasty   POD #0      SUBJECTIVE: 89y Patient seen and examined. Difficult to illicit history due to patients dementia. Denies pain at this time. Patient denies of acute sensory or motor changes.                           14.9   13.62 )-----------( 211      ( 01 Oct 2019 21:57 )             44.1       I&O's Detail    acetaminophen   Tablet .. 650 milliGRAM(s) Oral every 6 hours  aluminum hydroxide/magnesium hydroxide/simethicone Suspension 30 milliLiter(s) Oral four times a day PRN  artificial  tears Solution 1 Drop(s) Both EYES two times a day  calcium carbonate 1250 mG  + Vitamin D (OsCal 500 + D) 1 Tablet(s) Oral three times a day  ceFAZolin   IVPB 2000 milliGRAM(s) IV Intermittent every 8 hours  ciprofloxacin  0.3% Ophthalmic Solution 1 Drop(s) Both EYES every 6 hours  docusate sodium 100 milliGRAM(s) Oral three times a day  donepezil 5 milliGRAM(s) Oral at bedtime  enoxaparin Injectable 40 milliGRAM(s) SubCutaneous every 24 hours  erythromycin   Ointment 1 Application(s) Both EYES four times a day  finasteride 5 milliGRAM(s) Oral daily  HYDROmorphone  Injectable 0.5 milliGRAM(s) IV Push every 3 hours PRN  lactated ringers. 1000 milliLiter(s) IV Continuous <Continuous>  magnesium hydroxide Suspension 30 milliLiter(s) Oral two times a day PRN  metoprolol succinate ER 25 milliGRAM(s) Oral daily  morphine  - Injectable 4 milliGRAM(s) IV Push once  multivitamin 1 Tablet(s) Oral daily  ondansetron Injectable 4 milliGRAM(s) IV Push every 6 hours PRN  oxyCODONE    IR 5 milliGRAM(s) Oral every 4 hours PRN  oxyCODONE    IR 10 milliGRAM(s) Oral every 4 hours PRN  pantoprazole    Tablet 40 milliGRAM(s) Oral before breakfast  polyethylene glycol 3350 17 Gram(s) Oral daily  senna 2 Tablet(s) Oral at bedtime PRN  sodium chloride 0.9% lock flush 3 milliLiter(s) IV Push once  tamsulosin 0.4 milliGRAM(s) Oral at bedtime      T(C): 36.6 (10-02-19 @ 22:27), Max: 36.7 (10-02-19 @ 11:32)  HR: 66 (10-02-19 @ 22:27) (62 - 85)  BP: 100/64 (10-02-19 @ 22:27) (93/54 - 137/74)  RR: 16 (10-02-19 @ 22:27) (15 - 21)  SpO2: 93% (10-02-19 @ 22:27) (91% - 99%)  Wt(kg): --    PHYSICAL EXAM:     Surgical Extremity RLE: Limited due to patients dementia         Dressing: Clean/dry/intact. No active bleeding or discharge noted.          Sensation: normal to light touch. No focal deficits noted of the lower extremities.             Motor exam: Able to DF/PF                                                         Vascular:  + warm well perfused; capillary refill <3 seconds                                                       A/P :  89y Male S/P R hip nito POD#0     -  Pain control  -  DVT ppx lovenox 40mg QD x 4 weeks  -  PT and out of bed  -  Weight bearing status: WBAT  -  Ancef per scip Is This A New Presentation, Or A Follow-Up?: Skin Lesions

## 2021-12-07 NOTE — PHYSICAL THERAPY INITIAL EVALUATION ADULT - NEUROVASCULAR ASSESSMENT RUE
unable to assess due to cognition/no discoloration/warm O-Z Plasty Text: The defect edges were debeveled with a #15 scalpel blade.  Given the location of the defect, shape of the defect and the proximity to free margins an O-Z plasty (double transposition flap) was deemed most appropriate.  Using a sterile surgical marker, the appropriate transposition flaps were drawn incorporating the defect and placing the expected incisions within the relaxed skin tension lines where possible.    The area thus outlined was incised deep to adipose tissue with a #15 scalpel blade.  The skin margins were undermined to an appropriate distance in all directions utilizing iris scissors.  Hemostasis was achieved with electrocautery.  The flaps were then transposed into place, one clockwise and the other counterclockwise, and anchored with interrupted buried subcutaneous sutures.

## 2021-12-22 ENCOUNTER — INPATIENT (INPATIENT)
Facility: HOSPITAL | Age: 86
LOS: 6 days | Discharge: HOME | DRG: 85 | End: 2021-12-29
Attending: STUDENT IN AN ORGANIZED HEALTH CARE EDUCATION/TRAINING PROGRAM | Admitting: INTERNAL MEDICINE
Payer: MEDICARE

## 2021-12-22 VITALS
WEIGHT: 210.1 LBS | HEART RATE: 100 BPM | TEMPERATURE: 98 F | HEIGHT: 69 IN | OXYGEN SATURATION: 98 % | RESPIRATION RATE: 20 BRPM | DIASTOLIC BLOOD PRESSURE: 99 MMHG | SYSTOLIC BLOOD PRESSURE: 160 MMHG

## 2021-12-22 DIAGNOSIS — Z98.890 OTHER SPECIFIED POSTPROCEDURAL STATES: Chronic | ICD-10-CM

## 2021-12-22 LAB
ALBUMIN SERPL ELPH-MCNC: 3.8 G/DL — SIGNIFICANT CHANGE UP (ref 3.3–5.2)
ALP SERPL-CCNC: 117 U/L — SIGNIFICANT CHANGE UP (ref 40–120)
ALT FLD-CCNC: 19 U/L — SIGNIFICANT CHANGE UP
ANION GAP SERPL CALC-SCNC: 13 MMOL/L — SIGNIFICANT CHANGE UP (ref 5–17)
APTT BLD: 32.5 SEC — SIGNIFICANT CHANGE UP (ref 27.5–35.5)
AST SERPL-CCNC: 35 U/L — SIGNIFICANT CHANGE UP
BASOPHILS # BLD AUTO: 0 K/UL — SIGNIFICANT CHANGE UP (ref 0–0.2)
BASOPHILS NFR BLD AUTO: 0 % — SIGNIFICANT CHANGE UP (ref 0–2)
BILIRUB SERPL-MCNC: 0.6 MG/DL — SIGNIFICANT CHANGE UP (ref 0.4–2)
BUN SERPL-MCNC: 24.5 MG/DL — HIGH (ref 8–20)
CALCIUM SERPL-MCNC: 9.3 MG/DL — SIGNIFICANT CHANGE UP (ref 8.6–10.2)
CHLORIDE SERPL-SCNC: 100 MMOL/L — SIGNIFICANT CHANGE UP (ref 98–107)
CO2 SERPL-SCNC: 23 MMOL/L — SIGNIFICANT CHANGE UP (ref 22–29)
CREAT SERPL-MCNC: 1.88 MG/DL — HIGH (ref 0.5–1.3)
EOSINOPHIL # BLD AUTO: 0 K/UL — SIGNIFICANT CHANGE UP (ref 0–0.5)
EOSINOPHIL NFR BLD AUTO: 0 % — SIGNIFICANT CHANGE UP (ref 0–6)
GLUCOSE SERPL-MCNC: 147 MG/DL — HIGH (ref 70–99)
HCT VFR BLD CALC: 42.4 % — SIGNIFICANT CHANGE UP (ref 39–50)
HGB BLD-MCNC: 13.9 G/DL — SIGNIFICANT CHANGE UP (ref 13–17)
INR BLD: 1.11 RATIO — SIGNIFICANT CHANGE UP (ref 0.88–1.16)
LIDOCAIN IGE QN: 28 U/L — SIGNIFICANT CHANGE UP (ref 22–51)
LYMPHOCYTES # BLD AUTO: 0.4 K/UL — LOW (ref 1–3.3)
LYMPHOCYTES # BLD AUTO: 2.6 % — LOW (ref 13–44)
MANUAL SMEAR VERIFICATION: SIGNIFICANT CHANGE UP
MCHC RBC-ENTMCNC: 32.8 GM/DL — SIGNIFICANT CHANGE UP (ref 32–36)
MCHC RBC-ENTMCNC: 34.2 PG — HIGH (ref 27–34)
MCV RBC AUTO: 104.2 FL — HIGH (ref 80–100)
MONOCYTES # BLD AUTO: 1.08 K/UL — HIGH (ref 0–0.9)
MONOCYTES NFR BLD AUTO: 7 % — SIGNIFICANT CHANGE UP (ref 2–14)
MYELOCYTES NFR BLD: 0.9 % — HIGH (ref 0–0)
NEUTROPHILS # BLD AUTO: 13.55 K/UL — HIGH (ref 1.8–7.4)
NEUTROPHILS NFR BLD AUTO: 87.7 % — HIGH (ref 43–77)
PLAT MORPH BLD: NORMAL — SIGNIFICANT CHANGE UP
PLATELET # BLD AUTO: 319 K/UL — SIGNIFICANT CHANGE UP (ref 150–400)
POLYCHROMASIA BLD QL SMEAR: SLIGHT — SIGNIFICANT CHANGE UP
POTASSIUM SERPL-MCNC: 4.8 MMOL/L — SIGNIFICANT CHANGE UP (ref 3.5–5.3)
POTASSIUM SERPL-SCNC: 4.8 MMOL/L — SIGNIFICANT CHANGE UP (ref 3.5–5.3)
PROT SERPL-MCNC: 7.5 G/DL — SIGNIFICANT CHANGE UP (ref 6.6–8.7)
PROTHROM AB SERPL-ACNC: 12.8 SEC — SIGNIFICANT CHANGE UP (ref 10.6–13.6)
RBC # BLD: 4.07 M/UL — LOW (ref 4.2–5.8)
RBC # FLD: 11.7 % — SIGNIFICANT CHANGE UP (ref 10.3–14.5)
RBC BLD AUTO: SIGNIFICANT CHANGE UP
SODIUM SERPL-SCNC: 136 MMOL/L — SIGNIFICANT CHANGE UP (ref 135–145)
TROPONIN T SERPL-MCNC: 0.05 NG/ML — SIGNIFICANT CHANGE UP (ref 0–0.06)
VARIANT LYMPHS # BLD: 1.8 % — SIGNIFICANT CHANGE UP (ref 0–6)
WBC # BLD: 15.45 K/UL — HIGH (ref 3.8–10.5)
WBC # FLD AUTO: 15.45 K/UL — HIGH (ref 3.8–10.5)

## 2021-12-22 PROCEDURE — 71045 X-RAY EXAM CHEST 1 VIEW: CPT | Mod: 26

## 2021-12-22 PROCEDURE — 70450 CT HEAD/BRAIN W/O DYE: CPT | Mod: 26,MA

## 2021-12-22 PROCEDURE — 72125 CT NECK SPINE W/O DYE: CPT | Mod: 26,MA

## 2021-12-22 PROCEDURE — 99285 EMERGENCY DEPT VISIT HI MDM: CPT

## 2021-12-22 PROCEDURE — 72170 X-RAY EXAM OF PELVIS: CPT | Mod: 26

## 2021-12-22 PROCEDURE — 93010 ELECTROCARDIOGRAM REPORT: CPT

## 2021-12-22 RX ORDER — TETANUS TOXOID, REDUCED DIPHTHERIA TOXOID AND ACELLULAR PERTUSSIS VACCINE, ADSORBED 5; 2.5; 8; 8; 2.5 [IU]/.5ML; [IU]/.5ML; UG/.5ML; UG/.5ML; UG/.5ML
0.5 SUSPENSION INTRAMUSCULAR ONCE
Refills: 0 | Status: COMPLETED | OUTPATIENT
Start: 2021-12-22 | End: 2021-12-22

## 2021-12-22 RX ADMIN — Medication 1 MILLIGRAM(S): at 23:40

## 2021-12-22 RX ADMIN — TETANUS TOXOID, REDUCED DIPHTHERIA TOXOID AND ACELLULAR PERTUSSIS VACCINE, ADSORBED 0.5 MILLILITER(S): 5; 2.5; 8; 8; 2.5 SUSPENSION INTRAMUSCULAR at 20:09

## 2021-12-22 NOTE — ED PROVIDER NOTE - NSFOLLOWUPINSTRUCTIONS_ED_ALL_ED_FT
Abrasión    Abrasion      Maverick abrasión es un owen o un rasguño en la piel. Debe cuidar cannon herida para que los microbios no entren en allen y produzcan maverick infección.      ¿Cuáles son las causas?    Esta afección se produce al raspar la piel contra algo o caer sobre maverick superficie, kelly el suelo. Cuando la piel se raspa contra algo, algunas capas de la piel pueden desprenderse.      ¿Cuáles son los signos o síntomas?    •Un owen o un rasguño.       •Sangrado.      •Maverick edwardo bon o charlee.      •Un moretón debajo de la herida.        ¿Cómo se trata?  El tratamiento de esta afección puede incluir:  •Limpiar la herida.      •Aplicar un ungüento sobre la herida.      •Colocar un vendaje sobre la herida.      •Aplicarse la vacuna contra el tétanos.        Siga estas instrucciones en cannon casa:    El médico puede indicarle lo siguiente:    Medicamentos     •Kirk o use los medicamentos de venta vani y los recetados solamente kelly se lo haya indicado el médico.      •Si le recetaron un antibiótico, tómelo kelly se lo haya indicado el médico. No deje de usarlo aunque comience a sentirse mejor.      Mantenga la herida limpia   •Limpie la herida 1 o 2 veces al día o con la frecuencia que le haya indicado el médico. Para hacer esto:  1.Lávese las seb emir al menos 20 segundos con agua y jabón suave. Hágalo antes y después de limpiar la herida.      2.Lave la herida con agua y jabón suave.      3.Enjuague katheryn el jabón.      4.Dé palmaditas con maverick toalla limpia a la herida para secarla. No frote la herida.      •Mantenga el vendaje, limpio y seco. Quíteselo y cámbieselo kelly se lo haya indicado el médico.  •Es posible que tenga que cambiarse el vendaje maverick o más veces por día o kelly se lo haya indicado el médico.        Observe si se presentan signos de infección   Controle la herida todos los días para descartar signos de infección. Esté atento a los siguientes signos:  •Maverick terrence bon que sale de la herida.      •Otro enrojecimiento.      •Hinchazón o más dolor.      •Calor.       •Jesús, líquido, pus o mal olor.        Trate el dolor y la hinchazón  •Aplique hielo sobre la edwardo lesionada si se lo indican. Para hacer esto:  •Ponga el hielo en maverick bolsa plástica.       •Coloque maverick toalla entre la piel y la bolsa.       •Aplique el hielo emir 20 minutos, 2 o 3 veces por día.      •Retire el hielo si la piel se le pone de color boyle brillante. Baird es muy importante. Si no puede sentir dolor, calor o frío, tiene un mayor riesgo de que se dañe la edwardo.        •Si puede, levante la edwardo lesionada por encima del nivel del corazón cuando esté sentado o acostado.      Indicaciones generales    •No tome harry de inmersión, no practique natación ni utilice el jacuzzi. Pregunte a cannon médico si puede ducharse o darse harry de esponja.      •Cumpla con todas las visitas de seguimiento.        Comuníquese con un médico si:  •Le dieron la vacuna antitetánica y en el lugar de la inserción de la aguja tiene alguno de estos signos:  •Hinchazón.      •Dolor intenso.      •Enrojecimiento.      •Sangrado.        •Siente mucho dolor y los medicamentos no lo ayudan.      •Tiene fiebre.    •Tiene cualquiera de estos signos de infección en la herida:  •Enrojecimiento, hinchazón o más dolor.      •Jesús, líquido, pus o mal olor.      •Calor.          Solicite ayuda de inmediato si:    •Tiene maverick línea bon que sale de la herida.        Resumen    •Maverick abrasión es un owen o un rasguño en la piel. Cuídese la herida para que no le entren microbios.      •Límpiese la herida 1 o 2 veces al día o con la frecuencia que le hayan indicado. Cámbiese el vendaje kelly se lo hayan indicado y use los medicamentos según las instrucciones.      •Llame al médico si tiene fiebre o si tiene enrojecimiento, hinchazón o más dolor en la herida.      •Llame a cannon médico si tiene jesús, líquido, pus o mal olor en la herida.      •Solicite ayuda de inmediato si tiene maverick línea bon que sale de la herida.      Esta información no tiene kelly fin reemplazar el consejo del médico. Asegúrese de hacerle al médico cualquier pregunta que tenga.

## 2021-12-22 NOTE — ED ADULT NURSE NOTE - OBJECTIVE STATEMENT
Pt reports to the ED for fall, pt fell down three steps.  used. Pt has been wandering throughout the ED, Safety watch has been initiated. Pt is able to answer questions but some confusion is noted. Pt denies hitting his head. Pt poor historian. PT cooperative with safety watch, pt initially refused labs and testing but pt has agreed.

## 2021-12-22 NOTE — ED PROVIDER NOTE - PROGRESS NOTE DETAILS
Julián: I discussed with NSx PA who will evaluate patient. Julián: I discussed with trauma resident who will evaluate the patient. Jake BUSCH: patient cleared by trauma. NSG wants rpt CTH and MRI. NSG requesting medicine admission, will follow. Patient admitted to medicine.

## 2021-12-22 NOTE — CONSULT NOTE ADULT - SUBJECTIVE AND OBJECTIVE BOX
89y old  Male who presents s/p fall down 3 steps although was unwitnessed.  Patient poor historian, no family at bedside and patient is confused attempting to get out of bed.    Patient denies any complaints at time of examination         PAST MEDICAL & SURGICAL HISTORY:  Poor historian    Hypertension    Dementia    CHF (congestive heart failure)  stage I diastolic    Blindness of R eye     BPH (benign prostatic hyperplasia)    S/P ORIF (open reduction internal fixation) fracture  right hip      FAMILY HISTORY:  No pertinent family history in first degree relatives        SOCIAL HISTORY:  Tobacco Use: Unknown   EtOH use: Unknown  Substance: Unknown    Allergies    No Known Allergies        REVIEW OF SYSTEMS  Review of Systems is Limited due to patient's neurologic status.    HOME MEDICATIONS:  Home Medications:  acetaminophen 325 mg oral tablet: 2 tab(s) orally every 6 hours (04 Oct 2019 11:01)  aluminum hydroxide-magnesium hydroxide 200 mg-200 mg/5 mL oral suspension: 30 milliliter(s) orally 4 times a day, As needed, Indigestion (04 Oct 2019 11:04)  calcium-vitamin D 500 mg-200 intl units oral tablet: 1 tab(s) orally 3 times a day (04 Oct 2019 11:05)  Colace 100 mg oral capsule: 1 cap(s) orally once a day (02 Oct 2019 02:26)  donepezil 5 mg oral tablet: 1 tab(s) orally once a day (at bedtime) (02 Oct 2019 02:26)  finasteride 5 mg oral tablet: 1 tab(s) orally once a day (02 Oct 2019 02:26)  magnesium hydroxide 8% oral suspension: 30 milliliter(s) orally 2 times a day, As needed, Constipation (04 Oct 2019 11:04)  metoprolol succinate 25 mg oral tablet, extended release: 1 tab(s) orally once a day (02 Oct 2019 02:26)  Multiple Vitamins oral tablet: 1 tab(s) orally once a day (04 Oct 2019 11:05)  pantoprazole 40 mg oral delayed release tablet: 1 tab(s) orally once a day (before a meal) (04 Oct 2019 11:05)  ramipril 5 mg oral capsule: 1 cap(s) orally once a day (02 Oct 2019 02:26)  Restasis 0.05% ophthalmic emulsion: 1 drop(s) to each affected eye every 12 hours (02 Oct 2019 02:26)  tamsulosin 0.4 mg oral capsule: 1 cap(s) orally once a day (at bedtime) (02 Oct 2019 02:26)      Vital Signs Last 24 Hrs  T(C): 36.8 (22 Dec 2021 16:49), Max: 36.8 (22 Dec 2021 16:49)  T(F): 98.2 (22 Dec 2021 16:49), Max: 98.2 (22 Dec 2021 16:49)  HR: 100 (22 Dec 2021 16:49) (100 - 100)  BP: 160/99 (22 Dec 2021 16:49) (160/99 - 160/99)  BP(mean): --  RR: 20 (22 Dec 2021 16:49) (20 - 20)  SpO2: 98% (22 Dec 2021 16:49) (98% - 98%)      PHYSICAL EXAM:  GENERAL: NAD, well-groomed  HEAD:  Atraumatic, normocephalic  Awake, alert and oriented x1 (person)   Right Pupil surgical; L pupil 3-2mm  EOMI b/l; Speech clear/fluent but confused   CHAPIN x4 with 5/5 strength; no drift   following simple commands   CHEST/LUNG: Clear to auscultation bilaterally  HEART: +S1/+S2; Regular rate and rhythm  ABDOMEN: Soft, nontender, nondistended  EXTREMITIES:  2+ peripheral pulses  LYMPH: No lymphadenopathy noted  SKIN: Warm, dry    LABS:                        13.9   15.45 )-----------( 319      ( 22 Dec 2021 20:10 )             42.4     12-22    136  |  100  |  24.5<H>  ----------------------------<  147<H>  4.8   |  23.0  |  1.88<H>    Ca    9.3      22 Dec 2021 20:10    TPro  7.5  /  Alb  3.8  /  TBili  0.6  /  DBili  x   /  AST  35  /  ALT  19  /  AlkPhos  117  12-22    PT/INR - ( 22 Dec 2021 20:10 )   PT: 12.8 sec;   INR: 1.11 ratio         PTT - ( 22 Dec 2021 20:10 )  PTT:32.5 sec      CULTURES:      RADIOLOGY & ADDITIONAL STUDIES:  CT head:  The ventricular and sulcal size and configuration is age appropriate.     There is no acute loss of gray-white differentiation. There are moderate   patchy areas of hypodensity in the periventricular and subcortical white   matter which are likely related to chronic microangiopathic changes.   There is stable size of an extra-axial left parasagittal interhemispheric   hyperattenuating mass measuring 2.8 x 2.4 x 2.6 cm. There is stable mass   effect on the medial aspect of the posterior left frontal lobe. There is   no underlying parenchymal vasogenic edema. Stable 8 mm hyperattenuating   interhemispheric left parasagittal lesion posteriorly. Chronic bilateral   subinsular lacunar infarcts.    There is minimal left anterior frontal sulcal hyperattenuation which may   represent trace subarachnoid hemorrhage.    There is increased thickness of the falx anteriorly since the prior   study, measuring 3 mm, which may represent acute subdural hematoma.    There is no evidence of midline shift, acute intraparenchymal hemorrhage.   The calvarium is intact. There is opacification and moderate mucosal   thickening of several ethmoid air cells. There is a fluid level within   the left frontal sinus.Persistent opacification of the left mastoid air   cells. The orbits are unremarkable.      CT cervical spine:      There is maintenance of usual cervicallordosis. There is anterolisthesis   of C4 with respect to C5 measuring 3 mm. Multilevel disc space narrowing.    There is anterior wedging and mild loss of vertebral body height at T1   which may represent an age indeterminate compression fracture, new since   6/4/2021. There is also new interval slight retrolisthesis of the T1   vertebral body with respect to C7, measuring 3 mm.  There is an acute right pars interarticularis fracture at T1.      There are multilevel diffuse disc osteophyte complexes and ligamentum   flavum thickening which contribute to multilevel central canal narrowing.    There is multilevel facet hypertrophy and uncovertebral spurring which   contribute to multilevel neural foraminal narrowing.    The paravertebral softtissues are unremarkable.      IMPRESSION:  CT HEAD:  Possible trace left anterior frontal subarachnoid hemorrhage. Possible   small acute parafalcine subdural hematoma. No depressed calvarial   fracture.    Stable parasagittal meningiomas as described.  Paranasal sinus inflammatory changes with fluid level in the left frontal   sinus which may represent acute sinusitis in the appropriate clinical   setting.    CT CERVICAL SPINE: There is a mild age indeterminate compression fracture   at T1, newsince 6/4/2021. There is new interval slight retrolisthesis of   the T1 vertebral body with respect to C7, measuring 3 mm. There is an   acute right pars interarticularis fracture at T1. Recommend further   evaluation with MRI of the cervical spine.      CAPRINI SCORE [CLOT]:  Patient has an estimated Caprini score of greater than 5.  However, the patient's unique clinical situation will be addressed in an individual manner to determine appropriate anticoagulation treatment, if any.

## 2021-12-22 NOTE — ED PROVIDER NOTE - NSICDXPASTMEDICALHX_GEN_ALL_CORE_FT
PAST MEDICAL HISTORY:  Blindness of one eye     BPH (benign prostatic hyperplasia)     CHF (congestive heart failure) stage I diastolic    Dementia     Hypertension     Poor historian

## 2021-12-22 NOTE — ED ADULT TRIAGE NOTE - CHIEF COMPLAINT QUOTE
s/p trip and fall down 3 steps. denies loc denies blood thinners. denies pain, has abrasion to left hand bleeding controlled. pt is legally blind

## 2021-12-22 NOTE — ED PROVIDER NOTE - OBJECTIVE STATEMENT
89 y/o M with PMH right eye blindness, CHF, dementia, HTN, poor historian presents s/p a fall down 3 steps. He states "I'm fine, I'm okay." He sustained an abrasion to his right wrist, cannot recall a tetanus shot. He says he did not hit his head, he states that "three guys were there and they helped me get here." It is not clear the circumstances under which he fell. He denies allergies to medications.

## 2021-12-22 NOTE — ED PROVIDER NOTE - CLINICAL SUMMARY MEDICAL DECISION MAKING FREE TEXT BOX
89 y/o M with dementia presents for fall. Will evaluate for acute traumatic injury, labs, update tetanus.

## 2021-12-22 NOTE — ED PROVIDER NOTE - CARE PLAN
Principal Discharge DX:	Skin abrasion  Secondary Diagnosis:	Fall down stairs   1 Principal Discharge DX:	Skin abrasion  Secondary Diagnosis:	Fall down stairs  Secondary Diagnosis:	Thoracic vertebral fracture

## 2021-12-22 NOTE — ED PROVIDER NOTE - PHYSICAL EXAMINATION
Const: Awake, alert and oriented x 2. In no acute distress. Well appearing.  HEENT: NC/AT. Moist mucous membranes. Edentulous.  Eyes: No scleral icterus. EOMI. Right eye blindness  Neck:. Soft and supple. Full ROM without pain.  Cardiac: Regular rate and regular rhythm. +S1/S2. No murmurs. Peripheral pulses 2+ and symmetric. No LE edema.  Resp: Speaking in full sentences. No evidence of respiratory distress. Coarse breath sounds diffusely.  Abd: Soft, non-tender, non-distended. Normal bowel sounds in all 4 quadrants. No guarding or rebound.  Back: Spine midline and non-tender. No CVAT.  Skin: Abrasion to right wrist. No rashes, abrasions or lacerations.  MSK: Chest wall stable, pelvis stable, no deformities.   Neuro: Awake, alert & oriented x 2. Moves all extremities symmetrically. Coordination intact.

## 2021-12-23 DIAGNOSIS — T14.8XXA OTHER INJURY OF UNSPECIFIED BODY REGION, INITIAL ENCOUNTER: ICD-10-CM

## 2021-12-23 PROBLEM — N40.0 BENIGN PROSTATIC HYPERPLASIA WITHOUT LOWER URINARY TRACT SYMPTOMS: Chronic | Status: ACTIVE | Noted: 2021-06-04

## 2021-12-23 LAB
ANION GAP SERPL CALC-SCNC: 10 MMOL/L — SIGNIFICANT CHANGE UP (ref 5–17)
BUN SERPL-MCNC: 26 MG/DL — HIGH (ref 8–20)
CALCIUM SERPL-MCNC: 9.2 MG/DL — SIGNIFICANT CHANGE UP (ref 8.6–10.2)
CHLORIDE SERPL-SCNC: 105 MMOL/L — SIGNIFICANT CHANGE UP (ref 98–107)
CK MB CFR SERPL CALC: 14.5 NG/ML — HIGH (ref 0–6.7)
CK MB CFR SERPL CALC: 14.5 NG/ML — HIGH (ref 0–6.7)
CK SERPL-CCNC: 430 U/L — HIGH (ref 30–200)
CK SERPL-CCNC: 446 U/L — HIGH (ref 30–200)
CO2 SERPL-SCNC: 23 MMOL/L — SIGNIFICANT CHANGE UP (ref 22–29)
CREAT SERPL-MCNC: 1.7 MG/DL — HIGH (ref 0.5–1.3)
GLUCOSE SERPL-MCNC: 129 MG/DL — HIGH (ref 70–99)
HCT VFR BLD CALC: 39.5 % — SIGNIFICANT CHANGE UP (ref 39–50)
HGB BLD-MCNC: 13.1 G/DL — SIGNIFICANT CHANGE UP (ref 13–17)
LACTATE SERPL-SCNC: 1.6 MMOL/L — SIGNIFICANT CHANGE UP (ref 0.5–2)
MAGNESIUM SERPL-MCNC: 2.3 MG/DL — SIGNIFICANT CHANGE UP (ref 1.6–2.6)
MCHC RBC-ENTMCNC: 33.2 GM/DL — SIGNIFICANT CHANGE UP (ref 32–36)
MCHC RBC-ENTMCNC: 34.7 PG — HIGH (ref 27–34)
MCV RBC AUTO: 104.5 FL — HIGH (ref 80–100)
NT-PROBNP SERPL-SCNC: 908 PG/ML — HIGH (ref 0–300)
PHOSPHATE SERPL-MCNC: 2.7 MG/DL — SIGNIFICANT CHANGE UP (ref 2.4–4.7)
PLATELET # BLD AUTO: 207 K/UL — SIGNIFICANT CHANGE UP (ref 150–400)
POTASSIUM SERPL-MCNC: 4.6 MMOL/L — SIGNIFICANT CHANGE UP (ref 3.5–5.3)
POTASSIUM SERPL-SCNC: 4.6 MMOL/L — SIGNIFICANT CHANGE UP (ref 3.5–5.3)
PROCALCITONIN SERPL-MCNC: 0.14 NG/ML — HIGH (ref 0.02–0.1)
RAPID RVP RESULT: SIGNIFICANT CHANGE UP
RBC # BLD: 3.78 M/UL — LOW (ref 4.2–5.8)
RBC # FLD: 11.6 % — SIGNIFICANT CHANGE UP (ref 10.3–14.5)
SARS-COV-2 RNA SPEC QL NAA+PROBE: SIGNIFICANT CHANGE UP
SODIUM SERPL-SCNC: 138 MMOL/L — SIGNIFICANT CHANGE UP (ref 135–145)
TROPONIN T SERPL-MCNC: 0.05 NG/ML — SIGNIFICANT CHANGE UP (ref 0–0.06)
WBC # BLD: 10.23 K/UL — SIGNIFICANT CHANGE UP (ref 3.8–10.5)
WBC # FLD AUTO: 10.23 K/UL — SIGNIFICANT CHANGE UP (ref 3.8–10.5)

## 2021-12-23 PROCEDURE — 99223 1ST HOSP IP/OBS HIGH 75: CPT

## 2021-12-23 PROCEDURE — 70450 CT HEAD/BRAIN W/O DYE: CPT | Mod: 26

## 2021-12-23 PROCEDURE — 99233 SBSQ HOSP IP/OBS HIGH 50: CPT

## 2021-12-23 PROCEDURE — 70450 CT HEAD/BRAIN W/O DYE: CPT | Mod: 26,77

## 2021-12-23 RX ORDER — HYDRALAZINE HCL 50 MG
10 TABLET ORAL EVERY 8 HOURS
Refills: 0 | Status: DISCONTINUED | OUTPATIENT
Start: 2021-12-23 | End: 2021-12-29

## 2021-12-23 RX ORDER — SODIUM CHLORIDE 9 MG/ML
1000 INJECTION, SOLUTION INTRAVENOUS
Refills: 0 | Status: DISCONTINUED | OUTPATIENT
Start: 2021-12-23 | End: 2021-12-24

## 2021-12-23 RX ORDER — SODIUM CHLORIDE 9 MG/ML
1000 INJECTION, SOLUTION INTRAVENOUS
Refills: 0 | Status: DISCONTINUED | OUTPATIENT
Start: 2021-12-23 | End: 2021-12-23

## 2021-12-23 RX ORDER — SODIUM CHLORIDE 9 MG/ML
1000 INJECTION, SOLUTION INTRAVENOUS
Refills: 0 | Status: DISCONTINUED | OUTPATIENT
Start: 2021-12-23 | End: 2021-12-29

## 2021-12-23 RX ORDER — DEXTROSE 50 % IN WATER 50 %
15 SYRINGE (ML) INTRAVENOUS ONCE
Refills: 0 | Status: DISCONTINUED | OUTPATIENT
Start: 2021-12-23 | End: 2021-12-29

## 2021-12-23 RX ORDER — GLUCAGON INJECTION, SOLUTION 0.5 MG/.1ML
1 INJECTION, SOLUTION SUBCUTANEOUS ONCE
Refills: 0 | Status: DISCONTINUED | OUTPATIENT
Start: 2021-12-23 | End: 2021-12-29

## 2021-12-23 RX ORDER — DEXTROSE 50 % IN WATER 50 %
25 SYRINGE (ML) INTRAVENOUS ONCE
Refills: 0 | Status: DISCONTINUED | OUTPATIENT
Start: 2021-12-23 | End: 2021-12-29

## 2021-12-23 RX ORDER — PIPERACILLIN AND TAZOBACTAM 4; .5 G/20ML; G/20ML
3.38 INJECTION, POWDER, LYOPHILIZED, FOR SOLUTION INTRAVENOUS ONCE
Refills: 0 | Status: COMPLETED | OUTPATIENT
Start: 2021-12-23 | End: 2021-12-23

## 2021-12-23 RX ORDER — SODIUM CHLORIDE 9 MG/ML
1000 INJECTION INTRAMUSCULAR; INTRAVENOUS; SUBCUTANEOUS
Refills: 0 | Status: DISCONTINUED | OUTPATIENT
Start: 2021-12-23 | End: 2021-12-23

## 2021-12-23 RX ORDER — DEXTROSE 50 % IN WATER 50 %
12.5 SYRINGE (ML) INTRAVENOUS ONCE
Refills: 0 | Status: DISCONTINUED | OUTPATIENT
Start: 2021-12-23 | End: 2021-12-29

## 2021-12-23 RX ORDER — PIPERACILLIN AND TAZOBACTAM 4; .5 G/20ML; G/20ML
3.38 INJECTION, POWDER, LYOPHILIZED, FOR SOLUTION INTRAVENOUS EVERY 8 HOURS
Refills: 0 | Status: DISCONTINUED | OUTPATIENT
Start: 2021-12-23 | End: 2021-12-24

## 2021-12-23 RX ADMIN — Medication 1 MILLIGRAM(S): at 17:09

## 2021-12-23 RX ADMIN — PIPERACILLIN AND TAZOBACTAM 25 GRAM(S): 4; .5 INJECTION, POWDER, LYOPHILIZED, FOR SOLUTION INTRAVENOUS at 14:54

## 2021-12-23 RX ADMIN — Medication 2 MILLIGRAM(S): at 01:12

## 2021-12-23 RX ADMIN — Medication 2 MILLIGRAM(S): at 13:29

## 2021-12-23 RX ADMIN — SODIUM CHLORIDE 75 MILLILITER(S): 9 INJECTION INTRAMUSCULAR; INTRAVENOUS; SUBCUTANEOUS at 05:30

## 2021-12-23 RX ADMIN — PIPERACILLIN AND TAZOBACTAM 25 GRAM(S): 4; .5 INJECTION, POWDER, LYOPHILIZED, FOR SOLUTION INTRAVENOUS at 23:19

## 2021-12-23 RX ADMIN — PIPERACILLIN AND TAZOBACTAM 200 GRAM(S): 4; .5 INJECTION, POWDER, LYOPHILIZED, FOR SOLUTION INTRAVENOUS at 06:02

## 2021-12-23 RX ADMIN — Medication 1 MILLIGRAM(S): at 22:45

## 2021-12-23 NOTE — PROGRESS NOTE ADULT - ASSESSMENT
Patient is a 89 year old male with PMH of Dementia, BPH, GERD, CHF, Right Eye Blindness, Meningioma, and HTN (per documentation) who was BIBA after sustaining an unwitnessed fall and found to have a left frontal SAH, SDH and stable meningiomas. Patient also found to have an acute right pars interarticularis fracture at T1. Evaluated by neurosurgery/trauma surgery and recommended medicine admission.     1. Traumatic Left Frontal SAH and Acute Parafalcine Subdural Hematoma.   -admit to SDU with q2 neurochecks  -unwitnessed fall; unclear if it was a mechanical fall vs syncopal event  -Initial CT head reviewed (as above)  -Repeat CT head with questionable new right frontal SAD  -Will order repeat CT head 24 hours from initial CT head  -F/u MRI c-spine. Obtained consent from patient's attendant, Kelsi at [facility] on 12/23.  -Maintain Bedrest until cleared by neurosurgery  -hold chemical DVT ppx  -cardiac monitoring   -F/u TTE  -Fall precautions  -Neurosurgery recommendations- will obtain MRI c-spine to determine if he requires cervical collar with thoracic extension  -PT evaluation once cleared by NSGY    2. Traumatic T1 Fracture  -CT neck with acute right pars interarticularis fracture at T1  -Neurosurgery recommending bedrest  -Also cervical collar was recommended to ensure no ligamentous instability   -Neurochecks q 2 hours as above    3. Leukocytosis; now resolved  -patient with wet cough   -CXR- No acute radiographic findings and no change  -F/u blood cultures  -aspiration precautions  -SLP consult  -empiric antibiotics- IV Zosyn D1  -bronchodilators as needed  -repeat CBC to monitor WBC count    4. Elevated creatinine; JESSE on CKD vs CKD- improving  -appears hypovolemic; likely component of intravascular volume depletion  -unclear baseline renal function  -creatinine 1.88 on admission  -judicious hydration  -monitor I/Os  -avoid nephrotoxic agents and renally dose medications    5. Dementia with behavioral disturbance  -patient impulsive and agitated in the ED  -ripped off cervical collar  -s/p IV ativan in the ED  -currently calm/cooperative; will avoid benzos given elderly age  -constant observation ordered by ED team; will reassess for discontinuation   -supportive care    6. History of CHF   -appears hypovolemic  -check BNP with next lab draw  -hold diuretics  -check TTE as above  -strict I/Os, daily weights    7. History of HTN  -BP stable  -observe off PO anti-hypertensives  -IV hydralazine PRN    DVT ppx - SCDS    Patient has a history of dementia and is confused; unable to provide any history at this time. Updated patient's attendant, Kelsi on 12/23    Plan discussed with neurosurgery KERRIE Timmons and RN at bedside Patient is a 89 year old male with PMH of Dementia, BPH, GERD, CHF, Right Eye Blindness, Meningioma, and HTN (per documentation) who was BIBA after sustaining an unwitnessed fall and found to have a left frontal SAH, SDH and stable meningiomas. Patient also found to have an acute right pars interarticularis fracture at T1. Evaluated by neurosurgery/trauma surgery and recommended medicine admission.     1. Traumatic Left Frontal SAH and Acute Parafalcine Subdural Hematoma.   -admit to SDU with q2 neurochecks  -unwitnessed fall; unclear if it was a mechanical fall vs syncopal event  -Initial CT head reviewed (as above)  -Repeat CT head with questionable new right frontal SAD  -Will order repeat CT head 24 hours from initial CT head  -Maintain Bedrest until cleared by neurosurgery  -hold chemical DVT ppx  -cardiac monitoring, fall precautions  -F/u TTE  -F/u MRI c-spine. Obtained consent from patient's attendant, Kelsi at [facility] on 12/23.  -Neurosurgery recommendations- will obtain MRI c-spine to determine if he requires cervical collar with thoracic extension  -PT evaluation once cleared by NSGY    2. Traumatic T1 Fracture  -CT neck with acute right pars interarticularis fracture at T1  -Neurosurgery recommending bedrest  -Also cervical collar was recommended to ensure no ligamentous instability   -Neurochecks q 2 hours as above    3. Leukocytosis; now resolved  -patient with wet cough   -CXR- No acute radiographic findings and no change  -F/u blood cultures  -aspiration precautions  -SLP consult  -empiric antibiotics- IV Zosyn D1  -bronchodilators as needed  -repeat CBC to monitor WBC count    4. Elevated creatinine; JESSE on CKD vs CKD- improving  -appears hypovolemic; likely component of intravascular volume depletion  -unclear baseline renal function  -creatinine 1.88 on admission  -judicious hydration  -monitor I/Os  -avoid nephrotoxic agents and renally dose medications    5. Dementia with behavioral disturbance  -patient impulsive and agitated in the ED  -ripped off cervical collar  -s/p IV ativan in the ED  -currently calm/cooperative; will avoid benzos given elderly age  -constant observation ordered by ED team; will reassess for discontinuation   -supportive care    6. History of CHF   -appears hypovolemic  -check BNP with next lab draw  -hold diuretics  -F/u TTE  -strict I/Os, daily weights    7. History of HTN  -BP stable  -observe off PO anti-hypertensives  -IV hydralazine PRN    DVT ppx - SCDS  Dispo- [facility]ely [facility] ([phone]).     Patient has a history of dementia and is confused; unable to provide any history at this time. Updated patient's attendant, Kelsi on 12/23   Patient is a 89 year old male with PMH of Dementia, BPH, GERD, CHF, Right Eye Blindness, Meningioma, and HTN (per documentation) who was BIBA after sustaining an unwitnessed fall and found to have a left frontal SAH, SDH and stable meningiomas. Patient also found to have an acute right pars interarticularis fracture at T1. Evaluated by neurosurgery/trauma surgery and recommended medicine admission.     1. Traumatic Left Frontal SAH and Acute Parafalcine Subdural Hematoma.   -admit to SDU with q2 neurochecks  -unwitnessed fall; unclear if it was a mechanical fall vs syncopal event  -Initial CT head reviewed (as above)  -Repeat CT head with questionable new right frontal SAD  -Will order repeat CT head 24 hours from initial CT head  -Maintain Bedrest until cleared by neurosurgery  -hold chemical DVT ppx  -cardiac monitoring, fall precautions  -F/u TTE  -F/u MRI c-spine. Obtained consent from patient's attendant, Kelsi at [facility] on 12/23.  -Neurosurgery recommendations- will obtain MRI c-spine to determine if he requires cervical collar with thoracic extension  -PT evaluation once cleared by NSGY    2. Traumatic T1 Fracture  -CT neck with acute right pars interarticularis fracture at T1  -Neurosurgery recommending bedrest  -Also cervical collar was recommended to ensure no ligamentous instability   -Neurochecks q 2 hours as above    3. Leukocytosis; now resolved  -patient with wet cough   -CXR- No acute radiographic findings and no change  -F/u blood cultures  -aspiration precautions  -SLP consult  -empiric antibiotics- IV Zosyn D1  -bronchodilators as needed  -repeat CBC to monitor WBC count    4. Elevated creatinine; JESSE on CKD vs CKD- improving  -appears hypovolemic; likely component of intravascular volume depletion  -unclear baseline renal function  -creatinine 1.88 on admission  -judicious hydration  -monitor I/Os  -avoid nephrotoxic agents and renally dose medications    5. Dementia with behavioral disturbance  -patient impulsive and agitated in the ED  -ripped off cervical collar  -s/p IV ativan in the ED  -currently calm/cooperative; will avoid benzos given elderly age  -constant observation ordered by ED team; will reassess for discontinuation   -supportive care    6. History of CHF   -appears hypovolemic  -check BNP with next lab draw  -hold diuretics  -F/u TTE  -strict I/Os, daily weights    7. History of HTN  -BP stable  -observe off PO anti-hypertensives  -IV hydralazine PRN    DVT ppx - SCDS  Dispo- Likely [facility] ([phone]).   Appreciate palliative recs- Ethics c/s placed to assist with medical decision making given that the patient has no family members.     Patient has a history of dementia and is confused; unable to provide any history at this time. Updated patient's attendant, Kelsi on 12/23

## 2021-12-23 NOTE — CHART NOTE - NSCHARTNOTEFT_GEN_A_CORE
Ethics consult initiated.    This is a 89 year old male with past medical history of Dementia, BPH, GERD, CHF, Right Eye Blindness, Meningioma, and HTN (per documentation) who presented after sustaining an unwitnessed fall. CT head and neck showed a left frontal SAH, SDH and stable meningiomas. Patient also found to have an acute right pars interarticularis fracture at T1.  Patient was evaluated by neurosurgery and trauma surgery, who cleared the patient and recommended admission to medicine for syncope work up. Patient came from an adult home and has no known family or contacts. Alert and oriented x 1 per the primary team.    Ethics team consulted to assist the team with determining appropriate surrogate decision maker for this patient.    Discussion with Adina Schwab (Ethics Fellow) and Kelsi ( at [facility]) on 12/23/21: The patient has been there about 5 years, previously was in [facility]. Kelsi thinks for rehab but is unclear what brought him in there initially. She doesn’t know how long he was there or where he was before that. Their Adult home is not under [facility] or [facility]. The patient has no HCP or advanced directives. She is not aware of any family or friends. No one has contacted him or them or has come to visit him since he is there.    Discussion with Adina Schwab (Ethics Fellow) and She ( in ED) on 12/23/21: She is also not aware of any contacts for the patient. She reached out to Ciro Clark, the adult home administrator, and is awaiting a call back. She also reached out to [facility] but they have no information on him.    Discussion with Adina Schwab (Ethics Fellow) and Dr. Jeffrey Trejo (listed PCP) on 12/23/21: He saw the patient at the group [place] so he does not have any records or information regarding the patient.    Chart review revealed a SW note from 2015 naming a friend and  Sylvester, “children in [place]”, and a son Williams in [place], but there are no contacts for any of these. And per the note the patient did not have contact numbers for these people and was oriented at that time.     Further conversations and investigation ongoing.    Full consult note to follow.    Discussed with Collette Garay, Ethicist.      Adina Schwab  Ethics Fellow  [phone] Ethics consult initiated.    This is a 89 year old male with past medical history of Dementia, BPH, GERD, CHF, Right Eye Blindness, Meningioma, and HTN (per documentation) who presented after sustaining an unwitnessed fall. CT head and neck showed a left frontal SAH, SDH and stable meningiomas. Patient also found to have an acute right pars interarticularis fracture at T1.  Patient was evaluated by neurosurgery and trauma surgery, who cleared the patient and recommended admission to medicine for syncope work up. Patient came from an adult home and has no known family or contacts. Alert and oriented x 1 per the primary team.    Ethics team consulted to assist the team with determining appropriate surrogate decision maker for this patient.    Discussion with Adina Schwab (Ethics Fellow) and Kelsi ( at [facility]) on 12/23/21: The patient has been there about 5 years, previously was in [facility]. Kelsi thinks for rehab but is unclear what brought him in there initially. She doesn’t know how long he was there or where he was before that. Their Adult home is not under [facility] or [facility]. The patient has no HCP or advanced directives. She is not aware of any family or friends. No one has contacted him or them or has come to visit him since he is there.    Discussion with Adina Schwab (Ethics Fellow) and She ( in ED) on 12/23/21: She is also not aware of any contacts for the patient. She reached out to Ciro Clark, the adult home administrator, and is awaiting a call back. She also reached out to [facility] but they have no information on him.    Discussion with Adina Schwab (Ethics Fellow) and Dr. Jeffrey Trejo (listed PCP) on 12/23/21: He saw the patient at the group [place] so he does not have any records or information regarding the patient.    Chart review revealed a SW note from 2015 naming a friend and  Sylvester, “children in [place]”, and a son Williams in [place], but there are no contacts for any of these. And per the note the patient did not have contact numbers for these people and was oriented at that time.     Further conversations and investigation ongoing.    The 2010 [facility] Family Health Care Decisions Act (FHDCA)1 addresses the situation of a sick individual who lacks capacity and has no available surrogate. The FHDCA requires hospitals, after a patient is admitted, to determine if the patient has a health care agent, guardian, or a person who can serve as the patient’s surrogate. If the patient has no such person and lacks capacity, the hospital must identify, to the extent practical, the patient’s wishes and preferences about pending health care decisions. WHERE THE PATIENT CONTINUES TO BE INCAPACITATED THE ATTENDING PHYSICIAN MAY AUTHORIZE MAJOR MEDICAL TREATMENT AFTER CONSULTATION AND CONCURRENCE WITH THE PATIENT’S HEALTH CARE TEAM AND AN INDEPENDENT PHYSICIAN NOT INVOLVED WITH THE PATIENT’S CARE CONCURS WITH THE TREATMENT. In this case, the patient is acutely ill and is currently incapacitated due to a neurological injury, from making medical decisions.     Further conversations and investigation ongoing, but to date it appears this patient is unbefriended.     Full consult note to follow. Updated Dr. Pope and Dr. Ramírez regarding investigation thus far.     Discussed with Collette Garay, Ethicist.    Adina Schwab  Ethics Fellow  [phone] Ethics consult initiated.    This is a 89 year old male with past medical history of Dementia, BPH, GERD, CHF, Right Eye Blindness, Meningioma, and HTN (per documentation) who presented after sustaining an unwitnessed fall. CT head and neck showed a left frontal SAH, SDH and stable meningiomas. Patient also found to have an acute right pars interarticularis fracture at T1.  Patient was evaluated by neurosurgery and trauma surgery, who cleared the patient and recommended admission to medicine for syncope work up. Patient came from an adult home and has no known family or contacts. Alert and oriented x 1 per the primary team.    Ethics team consulted to assist the team with determining appropriate surrogate decision maker for this patient.    Discussion with Adina Schwab (Ethics Fellow) and Kelsi ( at [facility]) on 12/23/21: The patient has been there about 5 years, previously was in [facility]. Kelsi thinks for rehab but is unclear what brought him in there initially. She doesn’t know how long he was there or where he was before that. Their Adult home is not under [facility] or [facility]. The patient has no HCP or advanced directives. She is not aware of any family or friends. No one has contacted him or them or has come to visit him since he is there.    Discussion with Adina Schwab (Ethics Fellow) and She ( in ED) on 12/23/21: She is also not aware of any contacts for the patient. She reached out to Ciro Clark, the adult home administrator, and is awaiting a call back. She also reached out to [facility] but they have no information on him.    Discussion with Adina Schwab (Ethics Fellow) and Dr. Jeffrey Trejo (listed PCP) on 12/23/21: He saw the patient at the group [place] so he does not have any records or information regarding the patient.    Chart review revealed a SW note from 2015 naming a friend and  Sylvester, “children in [place]”, and a son Williams in [place], but there are no contacts for any of these. And per the note the patient did not have contact numbers for these people and was oriented at that time.     Further conversations and investigation ongoing.    The 2010 [facility] Family Health Care Decisions Act (FHDCA)1 addresses the situation of a sick individual who lacks capacity and has no available surrogate. The FHDCA requires hospitals, after a patient is admitted, to determine if the patient has a health care agent, guardian, or a person who can serve as the patient’s surrogate. If the patient has no such person and lacks capacity, the hospital must identify, to the extent practical, the patient’s wishes and preferences about pending health care decisions. WHERE THE PATIENT CONTINUES TO BE INCAPACITATED THE ATTENDING PHYSICIAN MAY AUTHORIZE MAJOR MEDICAL TREATMENT AFTER CONSULTATION AND CONCURRENCE WITH THE PATIENT’S HEALTH CARE TEAM AND AN INDEPENDENT PHYSICIAN NOT INVOLVED WITH THE PATIENT’S CARE CONCURS WITH THE TREATMENT. In this case, the patient is acutely ill and is currently incapacitated due to a neurological injury, from making medical decisions.     Further conversations and investigation ongoing, but to date it appears this patient is unbefriended, but not under the auspices of [facility] nor [facility].    Full consult note to follow. Updated Dr. oPpe and Dr. Ramírez regarding investigation thus far.     Discussed with Collette Garay, Ethicist.    Adina Schwab  Ethics Fellow  [phone]

## 2021-12-23 NOTE — PROGRESS NOTE ADULT - ASSESSMENT
pt 89 s/p fall   Pt noted to have a cervical fracture at T1 with slight retrolisthesis of the t1 vertebral body    Plan  pt admitted   maintain the patient in cervical collar  Pt will need mri of the cervical spine

## 2021-12-23 NOTE — CONSULT NOTE ADULT - SUBJECTIVE AND OBJECTIVE BOX
HPI:  Patient is a 89 year old male with PMH of Dementia, BPH, GERD, CHF, Right Eye Blindness, Meningioma, and HTN (per documentation) who was BIBA after sustaining an unwitnessed fall. Patient with dementia and received ativan therefore unable to provide any history at this time. Family could not be reached at numbers provided, therefore all information obtained from the chart. Patient reportedly fell down 3 steps and denied hitting his head in the ED. CT head and neck with possible left frontal SAH/SDH and an acute right pars interarticularis fracture at T1. Patient was evaluated by neurosurgery and trauma surgery, who cleared the patient and recommended admission to medicine for syncope work up. Neurosurgery recommended repeat CT head, MRI of the t-spine and neurochecks q 4 hours. Labs with leukocytosis and elevated serum creatinine. Unclear baseline. Patient afebrile in the ED, cultures and UA ordered. Empirically started on antibiotics given concerns for aspiration PNA.  Unable to obtain any further information at this time. (23 Dec 2021 03:19)      PERTINENT PMH REVIEWED: Yes No    PAST MEDICAL & SURGICAL HISTORY:  Poor historian    Hypertension    Dementia    CHF (congestive heart failure)  stage I diastolic    Blindness of one eye    BPH (benign prostatic hyperplasia)    S/P ORIF (open reduction internal fixation) fracture  right hip        SOCIAL HISTORY:                      Substance history:                    Admitted from:  home  SNF  [facility]                     Anabaptism/spirituality:                    Cultural concerns:                      Surrogate/HCP/Guardian: Phone#:    FAMILY HISTORY:  No pertinent family history in first degree relatives        Allergies    No Known Allergies    Intolerances        ADVANCE DIRECTIVES/TREATMENT PREFERENCES:  Full code, all aggressive measures desired   DNR/DNI - MOLST, continue all other medical treatments  DNR/DNI - MOLST, comfort measures only     Baseline ADLs (prior to admission):  Independent/ Dependent      Karnofsky/Palliative Performance Status Version 2:  %  http://npcrc.org/files/news/palliative_performance_scale_ppsv2.pdf    Present Symptoms:     Dyspnea: Mild Moderate Severe  Nausea/Vomiting: Yes No  Anxiety:  Yes No  Depression: Yes No  Fatigue: Yes No  Loss of appetite: Yes No  Constipation:     Pain:             Character-            Duration-            Effect-            Factors-            Frequency-            Location-            Severity-    Pain AD Score:  [email or web address]/[email or web address] (press ctrl + left click to view)    Review of Systems: Reviewed                     Negative:                     Positive:  Unable to obtain due to poor mentation   All others negative    MEDICATIONS  (STANDING):  dextrose 40% Gel 15 Gram(s) Oral once  dextrose 5% + sodium chloride 0.9%. 1000 milliLiter(s) (75 mL/Hr) IV Continuous <Continuous>  dextrose 5%. 1000 milliLiter(s) (50 mL/Hr) IV Continuous <Continuous>  dextrose 5%. 1000 milliLiter(s) (100 mL/Hr) IV Continuous <Continuous>  dextrose 50% Injectable 25 Gram(s) IV Push once  dextrose 50% Injectable 12.5 Gram(s) IV Push once  dextrose 50% Injectable 25 Gram(s) IV Push once  glucagon  Injectable 1 milliGRAM(s) IntraMuscular once  piperacillin/tazobactam IVPB.. 3.375 Gram(s) IV Intermittent every 8 hours    MEDICATIONS  (PRN):  hydrALAZINE Injectable 10 milliGRAM(s) IV Push every 8 hours PRN SBP > 160      PHYSICAL EXAM:    Vital Signs Last 24 Hrs  T(C): 36.7 (23 Dec 2021 13:55), Max: 37.2 (23 Dec 2021 08:00)  T(F): 98 (23 Dec 2021 13:55), Max: 99 (23 Dec 2021 08:00)  HR: 90 (23 Dec 2021 13:55) (81 - 100)  BP: 145/67 (23 Dec 2021 13:55) (101/44 - 160/99)  BP(mean): --  RR: 22 (23 Dec 2021 13:55) (18 - 22)  SpO2: 92% (23 Dec 2021 13:55) (92% - 99%)    General: alert  oriented x ____ lethargic agitated delirious                  cachexia  nonverbal  coma    HEENT: normal  dry mouth  ET tube/trach    Lungs: comfortable tachypnea/labored breathing  excessive secretions    CV: normal  tachycardia    GI: normal  distended  tender  no BS               PEG/NG/OG tube  constipation  last BM:     : normal  incontinent  oliguria/anuria  roper    MSK: normal  weakness  edema             ambulatory  bedbound/wheelchair bound    Neuro: no focal deficits cognitive impairment dysphagia dysarthria hemiplegia     Skin: normal  pressure ulcers- Stage_____  no rash    LABS:                        13.1   10.23 )-----------( 207      ( 23 Dec 2021 04:09 )             39.5     12-23    138  |  105  |  26.0<H>  ----------------------------<  129<H>  4.6   |  23.0  |  1.70<H>    Ca    9.2      23 Dec 2021 04:09  Phos  2.7     12-23  Mg     2.3     12-23    TPro  7.5  /  Alb  3.8  /  TBili  0.6  /  DBili  x   /  AST  35  /  ALT  19  /  AlkPhos  117  12-22    PT/INR - ( 22 Dec 2021 20:10 )   PT: 12.8 sec;   INR: 1.11 ratio         PTT - ( 22 Dec 2021 20:10 )  PTT:32.5 sec    I&O's Summary    22 Dec 2021 07:01  -  23 Dec 2021 07:00  --------------------------------------------------------  IN: 0 mL / OUT: 4 mL / NET: -4 mL        RADIOLOGY & ADDITIONAL STUDIES:       HPI: 89M with PMH as listed admitted 12/23 with reported fall. CT head revealed possible left frontal SAH. awaiting MRI.       PERTINENT PMH REVIEWED: Yes No    PAST MEDICAL & SURGICAL HISTORY:  Poor historian  Hypertension  Dementia  CHF (congestive heart failure)  stage I diastolic  Blindness of one eye  BPH (benign prostatic hyperplasia)  S/P ORIF (open reduction internal fixation) fracture  right hip    SOCIAL HISTORY:                      Substance history: unable to obtain as patient is agitated                     Admitted from: ? [facility]                     Faith/spirituality:                    Cultural concerns:                      Surrogate - unclear at this time     FAMILY HISTORY:  No pertinent family history in first degree relatives    Allergies    No Known Allergies    ADVANCE DIRECTIVES/TREATMENT PREFERENCES:  full code      Baseline ADLs (prior to admission):  Dependent      Karnofsky/Palliative Performance Status Version 2:  20-30 %  http://npcrc.org/files/news/palliative_performance_scale_[email or web address].pdf    Present Symptoms:     Dyspnea: none   Nausea/Vomiting: No  Anxiety:  Yes - agitated   Depression: unable   Fatigue: unable   Loss of appetite: unable   Constipation: none     Pain: none             Character-            Duration-            Effect-            Factors-            Frequency-            Location-            Severity-    Pain AD Score:  [email or web address] (press ctrl + left click to view)    Review of Systems: Reviewed                 Unable to obtain due to poor mentation   All others negative    MEDICATIONS  (STANDING):  dextrose 40% Gel 15 Gram(s) Oral once  dextrose 5% + sodium chloride 0.9%. 1000 milliLiter(s) (75 mL/Hr) IV Continuous <Continuous>  dextrose 5%. 1000 milliLiter(s) (50 mL/Hr) IV Continuous <Continuous>  dextrose 5%. 1000 milliLiter(s) (100 mL/Hr) IV Continuous <Continuous>  dextrose 50% Injectable 25 Gram(s) IV Push once  dextrose 50% Injectable 12.5 Gram(s) IV Push once  dextrose 50% Injectable 25 Gram(s) IV Push once  glucagon  Injectable 1 milliGRAM(s) IntraMuscular once  piperacillin/tazobactam IVPB.. 3.375 Gram(s) IV Intermittent every 8 hours    MEDICATIONS  (PRN):  hydrALAZINE Injectable 10 milliGRAM(s) IV Push every 8 hours PRN SBP > 160    PHYSICAL EXAM:    Vital Signs Last 24 Hrs  T(C): 36.7 (23 Dec 2021 13:55), Max: 37.2 (23 Dec 2021 08:00)  T(F): 98 (23 Dec 2021 13:55), Max: 99 (23 Dec 2021 08:00)  HR: 90 (23 Dec 2021 13:55) (81 - 100)  BP: 145/67 (23 Dec 2021 13:55) (101/44 - 160/99)  BP(mean): --  RR: 22 (23 Dec 2021 13:55) (18 - 22)  SpO2: 92% (23 Dec 2021 13:55) (92% - 99%)    General: agitated. cachectic    HEENT: normal      Lungs: comfortable     CV: normal      GI: normal     : normal     MSK: weakness      Neuro: no focal deficits     Skin: no rash    LABS:                     13.1   10.23 )-----------( 207      ( 23 Dec 2021 04:09 )             39.5     12-23    138  |  105  |  26.0<H>  ----------------------------<  129<H>  4.6   |  23.0  |  1.70<H>    Ca    9.2      23 Dec 2021 04:09  Phos  2.7     12-23  Mg     2.3     12-23    TPro  7.5  /  Alb  3.8  /  TBili  0.6  /  DBili  x   /  AST  35  /  ALT  19  /  AlkPhos  117  12-22    PT/INR - ( 22 Dec 2021 20:10 )   PT: 12.8 sec;   INR: 1.11 ratio       PTT - ( 22 Dec 2021 20:10 )  PTT:32.5 sec    I&O's Summary    22 Dec 2021 07:01  -  23 Dec 2021 07:00  --------------------------------------------------------  IN: 0 mL / OUT: 4 mL / NET: -4 mL    RADIOLOGY & ADDITIONAL STUDIES:    < from: CT Head No Cont (12.23.21 @ 03:07) >  IMPRESSION:  Motion degraded exam.  Left frontal scalp swelling. Scattered mild left frontal subarachnoid   hemorrhage and tiny extra-axial collection along the frontal convexity,   unchanged. Questionable new right frontal subarachnoid versus artifact.    --- End of Report ---    < end of copied text >    < from: CT Head No Cont (12.22.21 @ 20:24) >  IMPRESSION:  CT HEAD:  Possible trace left anterior frontal subarachnoid hemorrhage. Possible   small acute parafalcine subdural hematoma. No depressed calvarial   fracture.    Stable parasagittal meningiomas as described.  Paranasal sinus inflammatory changes with fluid level in the left frontal   sinus which may represent acute sinusitis in the appropriate clinical   setting.    CT CERVICAL SPINE: There is a mild age indeterminate compression fracture   at T1, newsince 6/4/2021. There is new interval slight retrolisthesis of   the T1 vertebral body with respect to C7, measuring 3 mm. There is an   acute right pars interarticularis fracture at T1. Recommend further   evaluation with MRI of the cervical spine.      Notification to clinician of alert:  Dr. Gallego was notified about the above findings at 9:48 PM on   12/22/2021 with readback confirmation. The opportunity for questions was   provided and all questions asked were answered.    --- End of Report ---    < end of copied text >

## 2021-12-23 NOTE — ED ADULT NURSE REASSESSMENT NOTE - NS ED NURSE REASSESS COMMENT FT1
PT hitting aide, non cooperative, attempting to get out of bed. De-escalation techniques attempted with no success. PT hitting aide, non cooperative, attempting to get out of bed. De-escalation techniques attempted with no success. Unable to complete neuro exam at this time as pt is uncooperative.

## 2021-12-23 NOTE — CONSULT NOTE ADULT - SUBJECTIVE AND OBJECTIVE BOX
Trauma consult    HPI:  89 year old Man with PMH of Dementia, right eye blindness, CHF, HTN,  s/p fall, was witnessed, unclear story, unable to obtain it from the patient due to his dementia  He has no complains.      Primary Survey  A - airway intact  B - bilateral breath sounds and good chest rise  C - initially BP: 101/44 (12-23-21 @ 00:00), palpable pulses in all extremities  D - GCS 14      Secondary survey  Gen: NAD  HEENT: NC/AT  CV: s1, s2, RRR  Pulm: CTA B/L  Chest: No TTP  Abd: Soft, ND, NT, no rebound, no guarding  Groin: Normal appearing  Ext: Palp radial b/l, palp DP b/l  Back: no TTP, no palpable runoff, stepoff, or deformity    PMH  Poor historian    Hypertension    Dementia    CHF (congestive heart failure)    Blindness of one eye    BPH (benign prostatic hyperplasia)      PSH  Poor historian    S/P ORIF (open reduction internal fixation) fracture      MEDS    Allergies    No Known Allergies    Intolerances        Social    Labs:                        13.9   15.45 )-----------( 319      ( 22 Dec 2021 20:10 )             42.4     12-22    136  |  100  |  24.5<H>  ----------------------------<  147<H>  4.8   |  23.0  |  1.88<H>    Ca    9.3      22 Dec 2021 20:10    TPro  7.5  /  Alb  3.8  /  TBili  0.6  /  DBili  x   /  AST  35  /  ALT  19  /  AlkPhos  117  12-22    PT/INR - ( 22 Dec 2021 20:10 )   PT: 12.8 sec;   INR: 1.11 ratio         PTT - ( 22 Dec 2021 20:10 )  PTT:32.5 sec          Imaging  < from: CT Cervical Spine No Cont (12.22.21 @ 20:24) >  IMPRESSION:  CT HEAD:  Possible trace left anterior frontal subarachnoid hemorrhage. Possible   small acute parafalcine subdural hematoma. No depressed calvarial   fracture.    Stable parasagittal meningiomas as described.  Paranasal sinus inflammatory changes with fluid level in the left frontal   sinus which may represent acute sinusitis in the appropriate clinical   setting.    CT CERVICAL SPINE: There is a mild age indeterminate compression fracture   at T1, newsince 6/4/2021. There is new interval slight retrolisthesis of   the T1 vertebral body with respect to C7, measuring 3 mm. There is an   acute right pars interarticularis fracture at T1. Recommend further   evaluation with MRI of the cervical spine.    < end of copied text >  < from: CT Cervical Spine No Cont (12.22.21 @ 20:24) >  IMPRESSION:  CT HEAD:  Possible trace left anterior frontal subarachnoid hemorrhage. Possible   small acute parafalcine subdural hematoma. No depressed calvarial   fracture.    Stable parasagittal meningiomas as described.  Paranasal sinus inflammatory changes with fluid level in the left frontal   sinus which may represent acute sinusitis in the appropriate clinical   setting.    CT CERVICAL SPINE: There is a mild age indeterminate compression fracture   at T1, newsince 6/4/2021. There is new interval slight retrolisthesis of   the T1 vertebral body with respect to C7, measuring 3 mm. There is an   acute right pars interarticularis fracture at T1. Recommend further   evaluation with MRI of the cervical spine.    < end of copied text >

## 2021-12-23 NOTE — H&P ADULT - ASSESSMENT
DVT ppx - SCDS Patient is a 89 year old male with PMH of Dementia, BPH, GERD, CHF, Right Eye Blindness, Meningioma, and HTN (per documentation) who was BIBA after sustaining an unwitnessed fall and found to have a left frontal SAH, SDH and stable meningiomas. Patient also found to have an acute right pars interarticularis fracture at T1. Evaluated by neurosurgery/trauma surgery and recommended medicine admission.     1. Traumatic Left Frontal SAH and Acute Parafalcine Subdural Hematoma.   -admit to SDU with q2 neurochecks  -unwitnessed fall; unclear if it was a mechanical fall vs syncopal event  -Initial CT head reviewed (as above)  -Repeat CT head with questionable new right frontal SAD  -Will order repeat CT head 24 hours from initial CT head  -Unable to order MRI brain (may require a skeletal survey if family continues to be unreachable in AM)  -Maintain Bedrest until cleared by neurosurgery  -hold chemical DVT ppx  -cardiac monitoring   -TTE ordered  -Fall precautions  -Neurosurgery and Trauma recommendations reviewed; awaiting further recommendations once evaluated by attending  -PT evaluation once cleared by NSG    2. Traumatic T1 Fracture  -CT neck with acute right pars interarticularis fracture at T1  -Neurosurgery recommending bedrest with MRI of the T spine  -Also cervical collar was recommended to ensure no ligamentous instability   -Neurochecks q 2 hours as above    3. Leukocytosis; rule out Aspiration Pneumonia   -patient with wet cough   -prelim CXR with increased interstitial markings, no acute infiltrate, elevated right hemidiaphram (discussed with night hawk)  -f/u official CXR and will consider CT chest if indicated  -check procal, urine legionella, blood cultures  -check COVID PCR and RVP  -aspiration precautions  -SLP consult  -empiric antibiotics  -bronchodilators as needed  -repeat CBC to monitor WBC count    4. Elevated creatinine; JESSE on CKD vs CKD  -appears hypovolemic; likely component of intravascular volume depletion  -unclear baseline renal function  -creatinine 1.88 on admission  -check UA, urine lytes  -check CPK  -history of BPH; check bladder scan  -judicious hydration  -monitor I/Os  -avoid nephrotoxic agents and renally dose medications  -repeat BMP to monitor for renal recovery    5. Dementia with behavioral disturbance  -patient impulsive and agitated in the ED  -ripped off cervical collar  -s/p IV ativan in the ED  -currently calm/cooperative; will avoid benzos given elderly age  -constant observation ordered by ED team; will reassess for discontinuation   -supportive care    6. History of CHF   -appears hypovolemic  -check BNP with next lab draw  -hold diuretics  -check TTE as above  -strict I/Os, daily weights\    7. History of HTN  -BP stable  -observe off PO anti-hypertensives  -IV hydralazine PRN    DVT ppx - SCDS    Patient has a history of dementia and is confused; unable to provide any history at this time. Unable to reach family as well. Unable to verify home medications or complete med rec. Will attempt to reach out to family again in AM.    Plan discussed with neurosurgery KERRIE Timmons and RN at bedside

## 2021-12-23 NOTE — PROGRESS NOTE ADULT - SUBJECTIVE AND OBJECTIVE BOX
INTERVAL HPI/OVERNIGHT EVENTS:  Pt admitted on 12/22  Pt s/p fall, fell 3 steps.   Pt ct demonstrates traumatic sah    MEDICATIONS  (STANDING):  dextrose 40% Gel 15 Gram(s) Oral once  dextrose 5% + sodium chloride 0.9%. 1000 milliLiter(s) (75 mL/Hr) IV Continuous <Continuous>  dextrose 5%. 1000 milliLiter(s) (100 mL/Hr) IV Continuous <Continuous>  dextrose 5%. 1000 milliLiter(s) (50 mL/Hr) IV Continuous <Continuous>  dextrose 50% Injectable 25 Gram(s) IV Push once  dextrose 50% Injectable 25 Gram(s) IV Push once  dextrose 50% Injectable 12.5 Gram(s) IV Push once  glucagon  Injectable 1 milliGRAM(s) IntraMuscular once  piperacillin/tazobactam IVPB.. 3.375 Gram(s) IV Intermittent every 8 hours    MEDICATIONS  (PRN):  hydrALAZINE Injectable 10 milliGRAM(s) IV Push every 8 hours PRN SBP > 160      Allergies    No Known Allergies    Intolerances      Vital Signs Last 24 Hrs  T(C): 37.1 (23 Dec 2021 16:41), Max: 37.2 (23 Dec 2021 08:00)  T(F): 98.8 (23 Dec 2021 16:41), Max: 99 (23 Dec 2021 08:00)  HR: 91 (23 Dec 2021 16:41) (81 - 98)  BP: 141/81 (23 Dec 2021 16:41) (101/44 - 145/67)  BP(mean): --  RR: 18 (23 Dec 2021 16:41) (18 - 22)  SpO2: 95% (23 Dec 2021 16:41) (92% - 99%) BMI (kg/m2): 31 (12-22-21 @ 16:49)      PHYSICAL EXAM  Pt awake, alert, resp, oriented x 1   GENERAL: NAD,  HEAD:  Atraumatic, Normocephalic  EYES: EOMI, PERRLA, conjunctiva and sclera clear  ENMT: No tonsillar erythema, exudates, or enlargement; Moist mucous membranes, Good dentition, No lesions  NECK: Supple, No JVD, Normal thyroid  NERVOUS SYSTEM:  Alert & Oriented X3, Good concentration; Motor Strength 5/5 B/L upper and lower extremities; DTRs 2+ intact and symmetrice      LABS:                          13.1   10.23 )-----------( 207      ( 23 Dec 2021 04:09 )             39.5     12-23    138  |  105  |  26.0<H>  ----------------------------<  129<H>  4.6   |  23.0  |  1.70<H>    Ca    9.2      23 Dec 2021 04:09  Phos  2.7     12-23  Mg     2.3     12-23    TPro  7.5  /  Alb  3.8  /  TBili  0.6  /  DBili  x   /  AST  35  /  ALT  19  /  AlkPhos  117  12-22    PT/INR - ( 22 Dec 2021 20:10 )   PT: 12.8 sec;   INR: 1.11 ratio         PTT - ( 22 Dec 2021 20:10 )  PTT:32.5 sec    I&O's Detail    22 Dec 2021 07:01  -  23 Dec 2021 07:00  --------------------------------------------------------  IN:  Total IN: 0 mL    OUT:    Voided (mL): 4 mL  Total OUT: 4 mL    Total NET: -4 mL        RADIOLOGY & ADDITIONAL TESTS:  < from: CT Head No Cont (12.23.21 @ 20:03) >  ACC: [id number] EXAM:  CT BRAIN                        PROCEDURE DATE:  12/23/2021    IMPRESSION: No definite change since 3:05 AM. Limited by motion artifact.   Cannot evaluate for subarachnoid hemorrhage  -- End of Report ---   end of copied text >  < from: CT Cervical Spine No Cont (12.22.21 @ 20:24) >    ACC: [id number] EXAM:  CT CERVICAL SPINE                        ACC: [id number] EXAM:  CT BRAIN                          PROCEDURE DATE:  12/22/2021        < end of copied text >  < from: CT Cervical Spine No Cont (12.22.21 @ 20:24) >  IMPRESSION:  CT HEAD:  Possible trace left anterior frontal subarachnoid hemorrhage. Possible   small acute parafalcine subdural hematoma. No depressed calvarial   fracture.    Stable parasagittal meningiomas as described.  Paranasal sinus inflammatory changes with fluid level in the left frontal   sinus which may represent acute sinusitis in the appropriate clinical   setting.    CT CERVICAL SPINE: There is a mild age indeterminate compression fracture   at T1, newsince 6/4/2021. There is new interval slight retrolisthesis of   the T1 vertebral body with respect to C7, measuring 3 mm. There is an   acute right pars interarticularis fracture at T1. Recommend further   evaluation with MRI of the cervical spine.        < end of copied text >

## 2021-12-23 NOTE — H&P ADULT - HISTORY OF PRESENT ILLNESS
Patient is a 89 year old male with PMH of Dementia, BPH, GERD, CHF, Right Eye Blindness, Meningioma, and HTN (per documentation) who was BIBA after sustaining an unwitnessed fall. Patient with dementia and received ativan therefore unable to provide any history at this time. Family could not be reached at numbers provided, therefore all information obtained from the chart. Patient reportedly fell down 3 steps and denied hitting his head in the ED. CT head and neck with possible left frontal SAH/SDH and an acute right pars interarticularis fracture at T1. Patient was evaluated by neurosurgery and trauma surgery, who cleared the patient and recommended admission to medicine for syncope work up. Neurosurgery recommended repeat CT head, MRI of the t-spine and neurochecks q 4 hours. Labs with leukocytosis and elevated serum creatinine. Unclear baseline. Patient afebrile in the ED, cultures and UA ordered. Empirically started on antibiotics given concerns for aspiration PNA.  Unable to obtain any further information at this time.

## 2021-12-23 NOTE — ED ADULT NURSE REASSESSMENT NOTE - NS ED NURSE REASSESS COMMENT FT1
Per Dr. Guzman, pt is unable to have MRI at this time as unsuccessful attempts to reach family to complete MRI screening form. MRI aware.

## 2021-12-23 NOTE — ED ADULT NURSE REASSESSMENT NOTE - NS ED NURSE REASSESS COMMENT FT1
Unable to complete neuro checks, Dr. Guzman aware and notified. Pt is either asleep or awake and non-compliant, attempting to get out of bed and hit staff members. At this time, pt is awake, de-escalation techniques utilized with success. Dr. Guzman also aware pt removes monitor intermittently refuses to wear it and then agrees at times.

## 2021-12-23 NOTE — PROGRESS NOTE ADULT - SUBJECTIVE AND OBJECTIVE BOX
[facility] Division of Hospital Medicine    Chief Complaint: fall    SUBJECTIVE / OVERNIGHT EVENTS: No acute events overnight. HD stable.    Patient denies chest pain, SOB, abd pain, N/V, fever, chills, dysuria or any other complaints. All remainder ROS negative.     MEDICATIONS  (STANDING):  dextrose 40% Gel 15 Gram(s) Oral once  dextrose 5% + sodium chloride 0.9%. 1000 milliLiter(s) (75 mL/Hr) IV Continuous <Continuous>  dextrose 5%. 1000 milliLiter(s) (50 mL/Hr) IV Continuous <Continuous>  dextrose 5%. 1000 milliLiter(s) (100 mL/Hr) IV Continuous <Continuous>  dextrose 50% Injectable 25 Gram(s) IV Push once  dextrose 50% Injectable 12.5 Gram(s) IV Push once  dextrose 50% Injectable 25 Gram(s) IV Push once  glucagon  Injectable 1 milliGRAM(s) IntraMuscular once  piperacillin/tazobactam IVPB.. 3.375 Gram(s) IV Intermittent every 8 hours    MEDICATIONS  (PRN):  hydrALAZINE Injectable 10 milliGRAM(s) IV Push every 8 hours PRN SBP > 160        I&O's Summary    22 Dec 2021 07:01  -  23 Dec 2021 07:00  --------------------------------------------------------  IN: 0 mL / OUT: 4 mL / NET: -4 mL        PHYSICAL EXAM:  Vital Signs Last 24 Hrs  T(C): 37.2 (23 Dec 2021 08:00), Max: 37.2 (23 Dec 2021 08:00)  T(F): 99 (23 Dec 2021 08:00), Max: 99 (23 Dec 2021 08:00)  HR: 81 (23 Dec 2021 08:00) (81 - 100)  BP: 134/77 (23 Dec 2021 08:00) (101/44 - 160/99)  BP(mean): --  RR: 18 (23 Dec 2021 08:00) (18 - 20)  SpO2: 95% (23 Dec 2021 08:00) (93% - 99%)    CONSTITUTIONAL: NAD, resting cofmormtably  CARDIOVASCULAR: Regular rate and rhythm, normal S1 and S2, no murmur/rub/gallop; No lower extremity edema; Peripheral pulses are 2+ bilaterally  ABDOMEN: Nontender to palpation, normoactive bowel sounds  PSYCH: A+Ox 1 (name)  NEUROLOGY: unable to assess 2/2 confusion    LABS:                        13.1   10.23 )-----------( 207      ( 23 Dec 2021 04:09 )             39.5     12-23    138  |  105  |  26.0<H>  ----------------------------<  129<H>  4.6   |  23.0  |  1.70<H>    Ca    9.2      23 Dec 2021 04:09  Phos  2.7     12-23  Mg     2.3     12-23    TPro  7.5  /  Alb  3.8  /  TBili  0.6  /  DBili  x   /  AST  35  /  ALT  19  /  AlkPhos  117  12-22    PT/INR - ( 22 Dec 2021 20:10 )   PT: 12.8 sec;   INR: 1.11 ratio         PTT - ( 22 Dec 2021 20:10 )  PTT:32.5 sec  CARDIAC MARKERS ( 23 Dec 2021 06:32 )  x     / x     / 430 U/L / x     / 14.5 ng/mL  CARDIAC MARKERS ( 23 Dec 2021 04:09 )  x     / 0.05 ng/mL / 446 U/L / x     / 14.5 ng/mL  CARDIAC MARKERS ( 22 Dec 2021 20:10 )  x     / 0.05 ng/mL / x     / x     / x              CAPILLARY BLOOD GLUCOSE      POCT Blood Glucose.: 133 mg/dL (22 Dec 2021 20:30)        RADIOLOGY & ADDITIONAL TESTS:  Results Reviewed:   Imaging Personally Reviewed:  Electrocardiogram Personally Reviewed:

## 2021-12-24 LAB
ALBUMIN SERPL ELPH-MCNC: 3.2 G/DL — LOW (ref 3.3–5.2)
ALP SERPL-CCNC: 108 U/L — SIGNIFICANT CHANGE UP (ref 40–120)
ALT FLD-CCNC: 16 U/L — SIGNIFICANT CHANGE UP
ANION GAP SERPL CALC-SCNC: 11 MMOL/L — SIGNIFICANT CHANGE UP (ref 5–17)
APPEARANCE UR: ABNORMAL
AST SERPL-CCNC: 31 U/L — SIGNIFICANT CHANGE UP
BACTERIA # UR AUTO: ABNORMAL
BILIRUB SERPL-MCNC: 0.7 MG/DL — SIGNIFICANT CHANGE UP (ref 0.4–2)
BILIRUB UR-MCNC: NEGATIVE — SIGNIFICANT CHANGE UP
BUN SERPL-MCNC: 21.3 MG/DL — HIGH (ref 8–20)
CALCIUM SERPL-MCNC: 9.2 MG/DL — SIGNIFICANT CHANGE UP (ref 8.6–10.2)
CHLORIDE SERPL-SCNC: 103 MMOL/L — SIGNIFICANT CHANGE UP (ref 98–107)
CO2 SERPL-SCNC: 24 MMOL/L — SIGNIFICANT CHANGE UP (ref 22–29)
COLOR SPEC: YELLOW — SIGNIFICANT CHANGE UP
CREAT ?TM UR-MCNC: 119 MG/DL — SIGNIFICANT CHANGE UP
CREAT SERPL-MCNC: 1.48 MG/DL — HIGH (ref 0.5–1.3)
DIFF PNL FLD: ABNORMAL
EPI CELLS # UR: SIGNIFICANT CHANGE UP
GLUCOSE BLDC GLUCOMTR-MCNC: 123 MG/DL — HIGH (ref 70–99)
GLUCOSE BLDC GLUCOMTR-MCNC: 123 MG/DL — HIGH (ref 70–99)
GLUCOSE BLDC GLUCOMTR-MCNC: 135 MG/DL — HIGH (ref 70–99)
GLUCOSE SERPL-MCNC: 120 MG/DL — HIGH (ref 70–99)
GLUCOSE UR QL: NEGATIVE MG/DL — SIGNIFICANT CHANGE UP
HCT VFR BLD CALC: 42.4 % — SIGNIFICANT CHANGE UP (ref 39–50)
HGB BLD-MCNC: 13.6 G/DL — SIGNIFICANT CHANGE UP (ref 13–17)
KETONES UR-MCNC: ABNORMAL
LEUKOCYTE ESTERASE UR-ACNC: NEGATIVE — SIGNIFICANT CHANGE UP
MCHC RBC-ENTMCNC: 32.1 GM/DL — SIGNIFICANT CHANGE UP (ref 32–36)
MCHC RBC-ENTMCNC: 34.1 PG — HIGH (ref 27–34)
MCV RBC AUTO: 106.3 FL — HIGH (ref 80–100)
NITRITE UR-MCNC: NEGATIVE — SIGNIFICANT CHANGE UP
NT-PROBNP SERPL-SCNC: 1159 PG/ML — HIGH (ref 0–300)
PH UR: 5 — SIGNIFICANT CHANGE UP (ref 5–8)
PLATELET # BLD AUTO: 252 K/UL — SIGNIFICANT CHANGE UP (ref 150–400)
POTASSIUM SERPL-MCNC: 4.7 MMOL/L — SIGNIFICANT CHANGE UP (ref 3.5–5.3)
POTASSIUM SERPL-SCNC: 4.7 MMOL/L — SIGNIFICANT CHANGE UP (ref 3.5–5.3)
PROT SERPL-MCNC: 6.9 G/DL — SIGNIFICANT CHANGE UP (ref 6.6–8.7)
PROT UR-MCNC: 15 MG/DL
RBC # BLD: 3.99 M/UL — LOW (ref 4.2–5.8)
RBC # FLD: 11.5 % — SIGNIFICANT CHANGE UP (ref 10.3–14.5)
RBC CASTS # UR COMP ASSIST: SIGNIFICANT CHANGE UP /HPF (ref 0–4)
SODIUM SERPL-SCNC: 138 MMOL/L — SIGNIFICANT CHANGE UP (ref 135–145)
SODIUM UR-SCNC: 122 MMOL/L — SIGNIFICANT CHANGE UP
SP GR SPEC: 1.02 — SIGNIFICANT CHANGE UP (ref 1.01–1.02)
UROBILINOGEN FLD QL: NEGATIVE MG/DL — SIGNIFICANT CHANGE UP
WBC # BLD: 9.85 K/UL — SIGNIFICANT CHANGE UP (ref 3.8–10.5)
WBC # FLD AUTO: 9.85 K/UL — SIGNIFICANT CHANGE UP (ref 3.8–10.5)
WBC UR QL: SIGNIFICANT CHANGE UP

## 2021-12-24 PROCEDURE — 99233 SBSQ HOSP IP/OBS HIGH 50: CPT

## 2021-12-24 PROCEDURE — 72141 MRI NECK SPINE W/O DYE: CPT | Mod: 26

## 2021-12-24 RX ORDER — FUROSEMIDE 40 MG
20 TABLET ORAL
Refills: 0 | Status: DISCONTINUED | OUTPATIENT
Start: 2021-12-24 | End: 2021-12-24

## 2021-12-24 RX ORDER — FUROSEMIDE 40 MG
20 TABLET ORAL ONCE
Refills: 0 | Status: COMPLETED | OUTPATIENT
Start: 2021-12-24 | End: 2021-12-24

## 2021-12-24 RX ORDER — ACETAMINOPHEN 500 MG
1000 TABLET ORAL ONCE
Refills: 0 | Status: COMPLETED | OUTPATIENT
Start: 2021-12-24 | End: 2021-12-24

## 2021-12-24 RX ORDER — SODIUM CHLORIDE 9 MG/ML
1000 INJECTION, SOLUTION INTRAVENOUS
Refills: 0 | Status: DISCONTINUED | OUTPATIENT
Start: 2021-12-24 | End: 2021-12-29

## 2021-12-24 RX ADMIN — SODIUM CHLORIDE 75 MILLILITER(S): 9 INJECTION, SOLUTION INTRAVENOUS at 16:32

## 2021-12-24 RX ADMIN — Medication 20 MILLIGRAM(S): at 13:59

## 2021-12-24 RX ADMIN — Medication 2 MILLIGRAM(S): at 15:03

## 2021-12-24 RX ADMIN — Medication 1000 MILLIGRAM(S): at 17:07

## 2021-12-24 RX ADMIN — PIPERACILLIN AND TAZOBACTAM 25 GRAM(S): 4; .5 INJECTION, POWDER, LYOPHILIZED, FOR SOLUTION INTRAVENOUS at 05:08

## 2021-12-24 RX ADMIN — Medication 400 MILLIGRAM(S): at 16:48

## 2021-12-24 RX ADMIN — Medication 2 MILLIGRAM(S): at 14:20

## 2021-12-24 NOTE — PROGRESS NOTE ADULT - ASSESSMENT
Patient is a 89 year old male with PMH of Dementia, BPH, GERD, CHF, Right Eye Blindness, Meningioma, and HTN (per documentation) who was BIBA after sustaining an unwitnessed fall and found to have a left frontal SAH, SDH and stable meningiomas. Patient also found to have an acute right pars interarticularis fracture at T1. Evaluated by neurosurgery/trauma surgery and recommended medicine admission.     1. Traumatic Left Frontal SAH and Acute Parafalcine Subdural Hematoma.   -admit to SDU with q2 neurochecks  -unwitnessed fall; unclear if it was a mechanical fall vs syncopal event  -Initial CT head reviewed (as above)  -Repeat CT head with questionable new right frontal SAD  -Will order repeat CT head 24 hours from initial CT head  -Maintain Bedrest until cleared by neurosurgery  -hold chemical DVT ppx  -cardiac monitoring, fall precautions  -F/u TTE- LVEF- 50%. Grade 1 DD  -F/u MRI c-spine. Obtained consent from patient's attendant, Kelsi at [facility] on 12/23. Expedited for today (12/25)  -Neurosurgery recommendations- will obtain MRI c-spine to determine if he requires cervical collar with thoracic extension  -PT evaluation once cleared by NSGY    2. Traumatic T1 Fracture  -CT neck with acute right pars interarticularis fracture at T1  -Neurosurgery recommending bedrest  -Also cervical collar was recommended to ensure no ligamentous instability   -Neurochecks q2 hours as above    3. Leukocytosis; now resolved  -patient with wet cough   -CXR- No acute radiographic findings and no change  -F/u blood cultures  -aspiration precautions  -SLP consult  - discontinued IV Zosyn  -bronchodilators as needed  -repeat CBC to monitor WBC count    4. Elevated creatinine; JESSE on CKD vs CKD- improving  -appears hypovolemic; likely component of intravascular volume depletion  -unclear baseline renal function  -creatinine 1.88 on admission  -judicious hydration  -monitor I/Os  -avoid nephrotoxic agents and renally dose medications    5. Dementia with behavioral disturbance  -patient impulsive and agitated in the ED  -ripped off cervical collar  -s/p IV ativan in the ED  -currently calm/cooperative; will avoid benzos given elderly age  -constant observation ordered by ED team; will reassess for discontinuation   -supportive care    6. History of CHF   -appears hypovolemic  -S/p IV Lasix 20 mg, x 1 (12/24)  -strict I/Os, daily weights    7. History of HTN  -BP stable  -observe off PO anti-hypertensives  -IV hydralazine PRN    DVT ppx - SCDS  Dispo- [facility] ([phone]).   Appreciate palliative recs- Ethics c/s placed to assist with medical decision making given that the patient has no family members.     Patient has a history of dementia and is confused; unable to provide any history at this time.

## 2021-12-24 NOTE — PROGRESS NOTE ADULT - SUBJECTIVE AND OBJECTIVE BOX
[facility] Division of Hospital Medicine    Chief Complaint: Fall    SUBJECTIVE / OVERNIGHT EVENTS: No acute events overnight. HD stable.     Patient denies chest pain, SOB, abd pain, N/V, fever, chills, dysuria or any other complaints. All remainder ROS negative.     MEDICATIONS  (STANDING):  dextrose 40% Gel 15 Gram(s) Oral once  dextrose 5% + sodium chloride 0.9%. 1000 milliLiter(s) (75 mL/Hr) IV Continuous <Continuous>  dextrose 5%. 1000 milliLiter(s) (50 mL/Hr) IV Continuous <Continuous>  dextrose 5%. 1000 milliLiter(s) (100 mL/Hr) IV Continuous <Continuous>  dextrose 50% Injectable 25 Gram(s) IV Push once  dextrose 50% Injectable 12.5 Gram(s) IV Push once  dextrose 50% Injectable 25 Gram(s) IV Push once  furosemide   Injectable 20 milliGRAM(s) IV Push once  furosemide   Injectable 20 milliGRAM(s) IV Push <User Schedule>  glucagon  Injectable 1 milliGRAM(s) IntraMuscular once  LORazepam   Injectable 2 milliGRAM(s) IV Push once  piperacillin/tazobactam IVPB.. 3.375 Gram(s) IV Intermittent every 8 hours    MEDICATIONS  (PRN):  hydrALAZINE Injectable 10 milliGRAM(s) IV Push every 8 hours PRN SBP > 160        I&O's Summary    23 Dec 2021 07:01  -  24 Dec 2021 07:00  --------------------------------------------------------  IN: 825 mL / OUT: 0 mL / NET: 825 mL        PHYSICAL EXAM:  Vital Signs Last 24 Hrs  T(C): 36.9 (24 Dec 2021 11:41), Max: 37.1 (23 Dec 2021 16:41)  T(F): 98.4 (24 Dec 2021 11:41), Max: 98.8 (23 Dec 2021 16:41)  HR: 92 (24 Dec 2021 11:41) (74 - 99)  BP: 142/73 (24 Dec 2021 11:41) (135/76 - 163/49)  BP(mean): --  RR: 23 (24 Dec 2021 04:51) (18 - 23)  SpO2: 94% (24 Dec 2021 04:51) (94% - 95%)    CONSTITUTIONAL: NAD, resting comfortably  CARDIOVASCULAR: Regular rate and rhythm, normal S1 and S2, no murmur/rub/gallop; No lower extremity edema; Peripheral pulses are 2+ bilaterally  ABDOMEN: Nontender to palpation, normoactive bowel sounds  PSYCH: A+Ox 1 (name)  NEUROLOGY: unable to assess 2/2 confusion    LABS:                        13.6   9.85  )-----------( 252      ( 24 Dec 2021 04:14 )             42.4     12-24    138  |  103  |  21.3<H>  ----------------------------<  120<H>  4.7   |  24.0  |  1.48<H>    Ca    9.2      24 Dec 2021 04:14  Phos  2.7     12-23  Mg     2.3     12-23    TPro  6.9  /  Alb  3.2<L>  /  TBili  0.7  /  DBili  x   /  AST  31  /  ALT  16  /  AlkPhos  108  12-24    PT/INR - ( 22 Dec 2021 20:10 )   PT: 12.8 sec;   INR: 1.11 ratio         PTT - ( 22 Dec 2021 20:10 )  PTT:32.5 sec  CARDIAC MARKERS ( 23 Dec 2021 06:32 )  x     / x     / 430 U/L / x     / 14.5 ng/mL  CARDIAC MARKERS ( 23 Dec 2021 04:09 )  x     / 0.05 ng/mL / 446 U/L / x     / 14.5 ng/mL  CARDIAC MARKERS ( 22 Dec 2021 20:10 )  x     / 0.05 ng/mL / x     / x     / x              CAPILLARY BLOOD GLUCOSE      POCT Blood Glucose.: 123 mg/dL (24 Dec 2021 12:39)        RADIOLOGY & ADDITIONAL TESTS:  Results Reviewed:   Imaging Personally Reviewed:  Electrocardiogram Personally Reviewed:

## 2021-12-24 NOTE — PATIENT PROFILE ADULT - FALL HARM RISK - HARM RISK INTERVENTIONS
Assistance with ambulation/Assistance OOB with selected safe patient handling equipment/Communicate Risk of Fall with Harm to all staff/Discuss with provider need for PT consult/Monitor for mental status changes/Monitor gait and stability/Move patient closer to nurses' station/Provide patient with walking aids - walker, cane, crutches/Reinforce activity limits and safety measures with patient and family/Reorient to person, place and time as needed/Tailored Fall Risk Interventions/Toileting schedule using arm’s reach rule for commode and bathroom/Use of alarms - bed, chair and/or voice tab/Visual Cue: Yellow wristband and red socks/Bed in lowest position, wheels locked, appropriate side rails in place/Call bell, personal items and telephone in reach/Instruct patient to call for assistance before getting out of bed or chair/Non-slip footwear when patient is out of bed/[place] to call system/Physically safe environment - no spills, clutter or unnecessary equipment/Purposeful Proactive Rounding/Room/bathroom lighting operational, light cord in reach

## 2021-12-24 NOTE — CONSULT NOTE ADULT - SUBJECTIVE AND OBJECTIVE BOX
Consult requested by: BANDAR Pope MD   Role: Attending   Service: Medicine       OTHER MD: VEE Ramírez DO, Palliative Care Attending  Consultant: Adina Schwab, RPA-C (Ethics Fellow) [phone] or (O) [phone]    Consult purpose:  Assist the team in the ethical dilemma posed by a 89 year old male, s/p fall now with acute illness and no known family, regarding appropriate surrogate decision maker.  Clinical summary: This is a 89 year old male with past medical history of Dementia, BPH, GERD, CHF, Right Eye Blindness, Meningioma, HTN (per documentation) and s/p right hip ORIF (open reduction internal fixation) fracture who presented to the ED after sustaining an unwitnessed fall down 3 steps. CT head and neck showed a left frontal SAH, SDH and stable meningiomas. Imaging also revealed to have an acute right pars interarticularis fracture at T1.  Patient was evaluated by neurosurgery with the recommendation for MRI of C-spine, hard cervical collar until MRI to ensure no ligamentous instability, bedrest and syncope work up. Patient evaluated and cleared by Trauma Surgery. Patient admitted to Medicine for treatment and management of traumatic left frontal SAH and Acute Parafalcine Subdural Hematoma, traumatic T1 fracture and elevated creatinine (1.88). Patient came from an adult home and has no known family or contacts. Alert and oriented x 1 per the primary team.    Ethics team consulted to assist the team with determining appropriate surrogate decision maker for this patient.    Prognosis Estimate (survival in hrs, days, wks, mos, yrs): poor due to acute illness and dementia  Patient Decision-Making Capacity:  Has limited capacity  Lacks capacity (per the primary team)  Patient Aware of:  Diagnosis:   Yes    No   Unknown    Prognosis:   Yes    No   Unknown       Name of medical decision-maker should patient lack capacity: None  Role:   Health Care Agent      Legal Surrogate	  Other stakeholders: None   Other Decision-Maker (i.e., HCA or Surrogate) Aware of:  Diagnosis: Yes   No      Prognosis:   Yes     No   Evidence of Patient’s Preference of Life-Sustaining Treatment (Written or Oral): None  Resuscitation status:   DNR:  Yes   No      DNI: Yes   No       Discussion with Collette Garay (Ethicist) and Dr. Pope (Medicine Attending) on 12/23/21: Reviewed case and concerns regarding appropriate decision maker for this patient.  Discussion with Collette Garay (Ethicist) and Dr. Ramírez (Palliative Care Attending) on 12/23/21: Case discussed specifically issue with appropriate surrogate and if he falls under the auspices of [facility].   Discussion with Adina Schwab (Ethics Fellow) and Kelsi ( at [facility]) on 12/23/21: The patient has been there about 5 years, previously was in [facility]. Kelsi thinks for rehab but is unclear what brought him in there initially. She doesn’t know how long he was there or where he was before that. Their Adult home is not under [facility] or [facility]. The patient has no HCP or advanced directives. She is not aware of any family or friends. No one has contacted him or them or has come to visit him since he is there.  Discussion with Adina Schwab (Ethics Fellow) and She Kerr LCSW ( in ED) on 12/23/21: She is also not aware of any contacts for the patient. She also spoke with Kelsi at [facility], who reports patient has no known next of kin, no family or friends involved. A note from 2015 admission states patient has a son "Williams in [place]" and "children in [place]" however patient did not have any contact info for them at that time. As per [place] [facility] [place] admissions, spoke to Al who reports no records in their system from that time as computer system likely updated or changed. She reached out to Ciro Clark, the adult home administrator, and is awaiting a call back.   Discussion with Adina Schwab (Ethics Fellow) and Dr. Jeffrey Trejo (listed PCP) on 12/23/21: He saw the patient at the [facility] so he does not have any records or information regarding the patient.  Chart review on 12/23/21: Revealed a SW note from 2015 naming a friend and  [place], "children in [place]", and a son Williams in [place], but there are no contacts for any of these. And per the note the patient did not have contact numbers for these people and was oriented at that time.   Discussion with Collette Garay (Ethicist) and Dr. Ramírez (Palliative Care Attending) on 12/23/21: Updated on info obtained thus far. At this time, we have identified that he is not under [facility] or [facility]. It appears he is unbefriended as such 2 clinicians can make major medical decisions. Prognostication in progress.   Discussion with Collette Garay (Ethicist) and Dr. Pope (Medicine Attending) on 12/24/21: Updated on info obtained thus far. At this time, we have identified that he is not under [facility] or [facility]. It appears he is unbefriended as such 2 clinicians can make major medical decisions. Still awaiting a call back from Group Home Administrator. Prognostication in progress.    Bioethics analysis: THE CENTRAL ETHICAL ISSUE IS THE CONFLICT INVOLVING (A) PATIENT AUTONOMY AND PROVIDER BENEFICENCE, ON THE ONE HAND, AND (B) PROVIDER NONMALEFICENECE, ON THE OTHER HAND.  The conflict that exists in this situation is one hospital clinicians infrequently encounter in acutely ill patients who have an undetermined prognosis for recovery but are without family members or surrogates to share decisions regarding appropriate medical decisions (aggressive interventions vs non-aggressive interventions). At one pole, is the bioethical principle of autonomy- here needing to be determined. In tension at the other pole is the physician’s duty of non-maleficence – with interventions that appear to neither have little long-term benefit nor restore a functional quality of life for a specific patient and may increase the patient’s suffering or prolong the dying process. In this case, prognostication is in progress.     Medical decision making for patients with neither decision-making capacity (DMC) nor a surrogate decision-maker presents an ethical challenge for healthcare providers because there is no way to obtain informed consent for treatment. In this case, the health team is commended for their virtue and invoking justice – by providing fairness and equitable care to while attempting to locate a surrogate.     The principle of respect for autonomous persons acknowledges a patient’s right to hold views, make choices, and take actions based on their values and beliefs(i). Furthermore, this principle recognizes the patient’s dignity and bodily integrity(i). Essential to this principle is the capacity for autonomous choice(i). In other words, the patient’s ability to decide what can and cannot be done to him according to his set of values. However, to preserve and empower this capacity to make choices, we must assess if the patient can do so. If not, we must find the appropriate neelam of the patient’s autonomy.   In this case, as per the primary team Mr. Flavio Starr does not have capacity for decision making due to his dementia and acute illness, and there is no known HCP.   The [facility] Health Care Decision Act (CDA) addresses the situation of a sick individual who lacks capacity, has no guardian or health care agent, but has an available surrogate.(ii) The CDA establishes a hierarchy of surrogacy, an order of priority of decision makers for the incapable patient:   a court-appointed guardian;   the spouse or domestic partner   adult children;   a parent;   a brother or sister;   a close friend(iii)    One person from the list must be reasonably available, willing, and competent to act.(iv) Such person shall be the surrogate provided no other person in a class higher in priority than the person designated is available.(iv) Also, that person may designate any other person on the list to be surrogate, provided no one in a class higher in priority than the person designated objects.(ii)    Efforts to locate persons to act as surrogates can be difficult. Under the CDA, a person is reasonably available when they can be contacted with diligent efforts by an attending physician, another person acting on behalf of an attending physician, or the hospital.(v) Diligent efforts does not mean to do any and everything in order to meet our obligation –it does not mean everything possible under the sun. We must employ a reasonableness test. There is no perfect judgment as to when we have finally performed diligent efforts to locate a person. It is simply fact-dependent.     At this time we have not identified any surrogates for this patient. Investigation ongoing.     The bioethical principle of beneficence calls on clinicians to respect patient autonomy and to honor and preserve aMtthias’ sense of dignity and personhood, his moral status. Clinicians may variably argue that beneficence calls for further aggressive interventions.     The 2010 [facility] Health Care Decisions Act (DCA)1 addresses the situation of a sick individual who lacks capacity and has no available surrogate. The DCA requires hospitals, after a patient is admitted, to determine if the patient has a health care agent, guardian, or a person who can serve as the patient’s surrogate. If the patient has no such person and lacks capacity, the hospital must identify, to the extent practical, the patient’s wishes and preferences about pending health care decisions. WHERE THE PATIENT CONTINUES TO BE INCAPACITATED THE ATTENDING PHYSICIAN MAY AUTHORIZE MAJOR MEDICAL TREATMENT AFTER CONSULTATION AND CONCURRENCE WITH THE PATIENT’S HEALTH CARE TEAM AND AN INDEPENDENT PHYSICIAN NOT INVOLVED WITH THE PATIENT’S CARE CONCURS WITH THE TREATMENT. In this case, the patient is acutely ill and is currently incapacitated due to a neurological injury, from making medical decisions.

## 2021-12-24 NOTE — PROGRESS NOTE ADULT - SUBJECTIVE AND OBJECTIVE BOX
Neurosurgery     HPI:      89 year old male with PMH of Dementia, BPH, GERD, CHF, Right Eye Blindness, Meningioma, and HTN (per documentation) who was BIBA after sustaining an unwitnessed fall. Patient with dementia and received ativan therefore unable to provide any history at this time. Family could not be reached at numbers provided, therefore all information obtained from the chart. Patient reportedly fell down 3 steps and denied hitting his head in the ED. CT head and neck with possible left frontal SAH/ SDH and an acute right pars interarticularis fracture at T1. Patient was evaluated by neurosurgery and trauma surgery, who cleared the patient and recommended admission to medicine for syncope work up. Neurosurgery recommended repeat CT head, MRI of the t-spine and neuro checks q 4 hours. Labs with leukocytosis and elevated serum creatinine. Unclear baseline. Patient afebrile in the ED, cultures and UA ordered. Empirically started on antibiotics given concerns for aspiration PNA.  Unable to obtain any further information at this time.      Work up revealed T 1 Fx he was placed  in a C Collar and MRI of the C spine was ordered     12/24 : seen and evaluated eyes closed easy to arouse to voice command answers to questions continues to be encephalopathic s/p Ativan / Dementia denies headache or pain , no pain on palpation of the posterior neck       MEDICATIONS  (STANDING):  dextrose 40% Gel 15 Gram(s) Oral once  dextrose 5% + sodium chloride 0.9%. 1000 milliLiter(s) (75 mL/Hr) IV Continuous <Continuous>  dextrose 5%. 1000 milliLiter(s) (50 mL/Hr) IV Continuous <Continuous>  dextrose 5%. 1000 milliLiter(s) (100 mL/Hr) IV Continuous <Continuous>  dextrose 50% Injectable 25 Gram(s) IV Push once  dextrose 50% Injectable 12.5 Gram(s) IV Push once  dextrose 50% Injectable 25 Gram(s) IV Push once  furosemide   Injectable 20 milliGRAM(s) IV Push once  furosemide   Injectable 20 milliGRAM(s) IV Push <User Schedule>  glucagon  Injectable 1 milliGRAM(s) IntraMuscular once  LORazepam   Injectable 2 milliGRAM(s) IV Push once  piperacillin/tazobactam IVPB.. 3.375 Gram(s) IV Intermittent every 8 hours    MEDICATIONS  (PRN):  hydrALAZINE Injectable 10 milliGRAM(s) IV Push every 8 hours PRN SBP > 160      Allergies    No Known Allergies    Intolerances      Vital Signs Last 24 Hrs  T(C): 37 (24 Dec 2021 08:05), Max: 37.1 (23 Dec 2021 16:41)  T(F): 98.6 (24 Dec 2021 08:05), Max: 98.8 (23 Dec 2021 16:41)  HR: 74 (24 Dec 2021 08:05) (74 - 99)  BP: 136/64 (24 Dec 2021 08:05) (135/76 - 163/49)  BP(mean): --  RR: 23 (24 Dec 2021 04:51) (18 - 23)  SpO2: 94% (24 Dec 2021 04:51) (92% - 95%)    PHYSICAL EXAM:  Cervical Spine : Collar replaced , no pain on palpation   GENERAL: NAD, encephalopathic due to dementia   HEAD:  Atraumatic, Normocephalic  EYES: EOMI, PERRLA, conjunctiva and sclera clear  NERVOUS SYSTEM:  Awake  alert oriented to self only   CN II-XII intact  opens eyes to command EOMI,   Face symmetrical tongue is midline speech is clear   Motor: CHAPIN against gravity well    Gait is not tested      LABS:                        13.6   9.85  )-----------( 252      ( 24 Dec 2021 04:14 )             42.4     12-24    138  |  103  |  21.3<H>  ----------------------------<  120<H>  4.7   |  24.0  |  1.48<H>    Ca    9.2      24 Dec 2021 04:14  Phos  2.7     12-23  Mg     2.3     12-23    TPro  6.9  /  Alb  3.2<L>  /  TBili  0.7  /  DBili  x   /  AST  31  /  ALT  16  /  AlkPhos  108  12-24    PT/INR - ( 22 Dec 2021 20:10 )   PT: 12.8 sec;   INR: 1.11 ratio         PTT - ( 22 Dec 2021 20:10 )  PTT:32.5 sec      RADIOLOGY & ADDITIONAL TESTS:    ACC: [id number] EXAM:  CT BRAIN                          PROCEDURE DATE:  12/23/2021          INTERPRETATION:  Clinical Indication: Follow-up head trauma    5mm axial sections of the brain were obtained from base to vertex,   without the intravenous administration of contrast material. Coronal and   axial computer-generated reconstructed views are available.    Comparison is made with the prior examination of 3:05 AM.    The examination is limited by motion artifact. Therefore evaluation for   subarachnoid hemorrhage is limited. There is moderate atrophy with   ventricular and sulcal prominence. A left parafalcine calcified mass   without significant edema is identified likely representing a geode,.      IMPRESSION: No definite change since 3:05 AM. Limited by motion artifact.   Cannot evaluate for subarachnoid hemorrhage    --- End of Report ---      DAIN DERAS MD; Attending Radiologist  This document has been electronically signed. Dec 23 2021  8:09PM    < from: CT Cervical Spine No Cont (12.22.21 @ 20:24) >    CT cervical spine:      There is maintenance of usual cervical lordosis. There is anterolisthesis   of C4 with respect to C5 measuring 3 mm. Multilevel disc space narrowing.    There is anterior wedging and mild loss of vertebral body height at T1   which may represent an age indeterminate compression fracture, new since   6/4/2021. There is also new interval slight retrolisthesis of the T1   vertebral body with respect to C7, measuring 3 mm.  There is an acute right pars interarticularis fracture at T1.      There are multilevel diffuse disc osteophyte complexes and ligamentum   flavum thickening which contribute to multilevel central canal narrowing.    There is multilevel facet hypertrophy and uncovertebral spurring which   contribute to multilevel neural foraminal narrowing.    The paravertebral soft tissues are unremarkable.    < end of copied text >      A/P : 89 year old male with Dementia , Blindness , Meningioma HTN who has a mechanical fall by report down 3 steps work up revealed T1 Fracture on CT of the C Spine . Stable     Continue C Collar until MRI of the C Spine is obtained   Awaiting MRI Cervical spine is pending to determine chronicity of the Fx   d/w Dr Oliveira

## 2021-12-24 NOTE — PATIENT PROFILE ADULT - NSPROGENSOURCEINFO_GEN_A_NUR
68 year old Female with PMHx of HTN, HLD, DM and COPD who was transferred from [facility] after receiving tPA for left sided weakness and right gaze preference. Patient's last known normal was about 5:00 pm day of presentation when her  spoke with her on the phone. About 20 minutes later he found her unresponsive on the floor. At the outside hospital, she was found to be minimally responsive with left sided weakness and right gaze preference. She was intubated for airway protection, and stroke code was called. Patient received IV tPA and was transferred to [facility]. No LVO was identified on CTA. Patient will be admitted to [facility].      
68 year old female who was seen via telemedicine at our [facility]. Upon arrival here, the patient was given 10 mg of versed and is intubated. Per telemed and EMS reports, patient was last known normal at 5 pm today when her  spoke to her on the phone and then she was found 20 minute later unreponsive with right gaze, left sided weakness. She was brought to our [facility] where tpa was administered for stroke symptoms and she was intubated for airway support before arrival here. The patient was becoming more responsive and slightly agitated during transport, reported to be pulling at the tube. The patient with history of HTN, HLD, DM, smoker    No known prior history of stroke.   Transferred here for CTA MP and admission to [facility] for post tpa monitoring   
Olimpia Cardoza is a 68-year-old female with PMHx of HTN, HLP, DM & COPD.  Patient presented to [facility] ED with L sided weakness and R gaze preference.  CTH revealed no acute pathology.  A telemedicine consult was placed and completed.  tPA was recommended and administered.  She was also intubated for airway protection. Transferred to [facility] on 6/4 for further evaluation and management.  Upon admission, CTA was negative for LVO.  MRI brain negative. MRI C-spine was too motion limited for proper exam.  C-collar in place. Per VN, imaging without any lesions, suspect event may have been seizure related rather than cerebrovascular in origin.  She was extubated on 6/5/18.  Hospital course complicated by continued neck pain and elevated kidney function (improved).  NCC and VN following.    Functional History: Patient lives in [facility] with 3 sons in a single story home with no steps to enter.  Prior to admission, (I) with ADLs and utilizes a RW.    DME: RW, WC, and shower chair.      
patient

## 2021-12-25 LAB
ALBUMIN SERPL ELPH-MCNC: 3.1 G/DL — LOW (ref 3.3–5.2)
ALP SERPL-CCNC: 105 U/L — SIGNIFICANT CHANGE UP (ref 40–120)
ALT FLD-CCNC: 16 U/L — SIGNIFICANT CHANGE UP
ANION GAP SERPL CALC-SCNC: 11 MMOL/L — SIGNIFICANT CHANGE UP (ref 5–17)
APPEARANCE UR: CLEAR — SIGNIFICANT CHANGE UP
AST SERPL-CCNC: 34 U/L — SIGNIFICANT CHANGE UP
BACTERIA # UR AUTO: ABNORMAL
BILIRUB SERPL-MCNC: 0.7 MG/DL — SIGNIFICANT CHANGE UP (ref 0.4–2)
BILIRUB UR-MCNC: NEGATIVE — SIGNIFICANT CHANGE UP
BUN SERPL-MCNC: 21.1 MG/DL — HIGH (ref 8–20)
CALCIUM SERPL-MCNC: 9 MG/DL — SIGNIFICANT CHANGE UP (ref 8.6–10.2)
CHLORIDE SERPL-SCNC: 106 MMOL/L — SIGNIFICANT CHANGE UP (ref 98–107)
CO2 SERPL-SCNC: 24 MMOL/L — SIGNIFICANT CHANGE UP (ref 22–29)
COLOR SPEC: YELLOW — SIGNIFICANT CHANGE UP
CREAT SERPL-MCNC: 1.48 MG/DL — HIGH (ref 0.5–1.3)
DIFF PNL FLD: ABNORMAL
EPI CELLS # UR: SIGNIFICANT CHANGE UP
GLUCOSE BLDC GLUCOMTR-MCNC: 123 MG/DL — HIGH (ref 70–99)
GLUCOSE BLDC GLUCOMTR-MCNC: 89 MG/DL — SIGNIFICANT CHANGE UP (ref 70–99)
GLUCOSE BLDC GLUCOMTR-MCNC: 96 MG/DL — SIGNIFICANT CHANGE UP (ref 70–99)
GLUCOSE SERPL-MCNC: 140 MG/DL — HIGH (ref 70–99)
GLUCOSE UR QL: NEGATIVE MG/DL — SIGNIFICANT CHANGE UP
HCT VFR BLD CALC: 43.5 % — SIGNIFICANT CHANGE UP (ref 39–50)
HGB BLD-MCNC: 14.1 G/DL — SIGNIFICANT CHANGE UP (ref 13–17)
HYALINE CASTS # UR AUTO: ABNORMAL /LPF
KETONES UR-MCNC: NEGATIVE — SIGNIFICANT CHANGE UP
LEGIONELLA AG UR QL: NEGATIVE — SIGNIFICANT CHANGE UP
LEUKOCYTE ESTERASE UR-ACNC: NEGATIVE — SIGNIFICANT CHANGE UP
MCHC RBC-ENTMCNC: 32.4 GM/DL — SIGNIFICANT CHANGE UP (ref 32–36)
MCHC RBC-ENTMCNC: 34.5 PG — HIGH (ref 27–34)
MCV RBC AUTO: 106.4 FL — HIGH (ref 80–100)
NITRITE UR-MCNC: NEGATIVE — SIGNIFICANT CHANGE UP
PH UR: 5 — SIGNIFICANT CHANGE UP (ref 5–8)
PLATELET # BLD AUTO: 248 K/UL — SIGNIFICANT CHANGE UP (ref 150–400)
POTASSIUM SERPL-MCNC: 4.3 MMOL/L — SIGNIFICANT CHANGE UP (ref 3.5–5.3)
POTASSIUM SERPL-SCNC: 4.3 MMOL/L — SIGNIFICANT CHANGE UP (ref 3.5–5.3)
PROT SERPL-MCNC: 6.6 G/DL — SIGNIFICANT CHANGE UP (ref 6.6–8.7)
PROT UR-MCNC: 15
RBC # BLD: 4.09 M/UL — LOW (ref 4.2–5.8)
RBC # FLD: 11.6 % — SIGNIFICANT CHANGE UP (ref 10.3–14.5)
RBC CASTS # UR COMP ASSIST: SIGNIFICANT CHANGE UP /HPF (ref 0–4)
SODIUM SERPL-SCNC: 141 MMOL/L — SIGNIFICANT CHANGE UP (ref 135–145)
SP GR SPEC: 1.01 — SIGNIFICANT CHANGE UP (ref 1.01–1.02)
UROBILINOGEN FLD QL: NEGATIVE MG/DL — SIGNIFICANT CHANGE UP
WBC # BLD: 8.8 K/UL — SIGNIFICANT CHANGE UP (ref 3.8–10.5)
WBC # FLD AUTO: 8.8 K/UL — SIGNIFICANT CHANGE UP (ref 3.8–10.5)
WBC UR QL: SIGNIFICANT CHANGE UP

## 2021-12-25 PROCEDURE — 99231 SBSQ HOSP IP/OBS SF/LOW 25: CPT

## 2021-12-25 PROCEDURE — 99233 SBSQ HOSP IP/OBS HIGH 50: CPT

## 2021-12-25 NOTE — PROGRESS NOTE ADULT - SUBJECTIVE AND OBJECTIVE BOX
HPI:  Patient is a 89 year old male with PMH of Dementia, BPH, GERD, CHF, Right Eye Blindness, Meningioma, and HTN (per documentation) who was BIBA after sustaining an unwitnessed fall. Patient with dementia and received ativan therefore unable to provide any history at this time. Family could not be reached at numbers provided, therefore all information obtained from the chart. Patient reportedly fell down 3 steps and denied hitting his head in the ED. CT head and neck with possible left frontal SAH/SDH and an acute right pars interarticularis fracture at T1. Patient was evaluated by neurosurgery and trauma surgery, who cleared the patient and recommended admission to medicine for syncope work up. Neurosurgery recommended repeat CT head, MRI of the t-spine and neurochecks q 4 hours. Labs with leukocytosis and elevated serum creatinine. Unclear baseline. Patient afebrile in the ED, cultures and UA ordered. Empirically started on antibiotics given concerns for aspiration PNA.  Unable to obtain any further information at this time. (23 Dec 2021 03:19)      Interval History:  24 Hour Events: Ethics consulted by primary team.  MRI performed, not read yet.    1. Patient's Neurologic Exam unchanged.    2. Patient's Hemodynamic's maintained without drips.    3. Patient's Respiratory status is: adequate    4. Patient is pending: MRI Read by Radiology to evaluate chronicity of fracture and repeat CTH.      Physical Exam:  Constitutional: NAD    Neuro  * Mental Status:  Awake, alert, conversational in Arabic, demented at baseline.   * Cranial Nerves: Cnii-Cnxii grossly intact  * Motor: RUE 5/5, LUE 5/5, RLE 5/5, LLE 5/5  * Sensory: Sensation intact to light touch  * Reflexes: Not assessed  * Gait: Not assessed    Vitals:  Vital Signs Last 24 Hrs  T(C): 37.1 (25 Dec 2021 08:07), Max: 37.4 (24 Dec 2021 15:49)  T(F): 98.8 (25 Dec 2021 08:07), Max: 99.3 (24 Dec 2021 15:49)  HR: 60 (25 Dec 2021 08:07) (60 - 101)  BP: 127/62 (25 Dec 2021 08:07) (121/- - 146/76)  BP(mean): --  RR: 18 (25 Dec 2021 08:07) (18 - 20)  SpO2: 96% (25 Dec 2021 08:07) (92% - 96%)    I&O:  I&O's Summary      Labs & Radiology:                        14.1   8.80  )-----------( 248      ( 25 Dec 2021 03:06 )             43.5       12-25    141  |  106  |  21.1<H>  ----------------------------<  140<H>  4.3   |  24.0  |  1.48<H>    Ca    9.0      25 Dec 2021 03:06    TPro  6.6  /  Alb  3.1<L>  /  TBili  0.7  /  DBili  x   /  AST  34  /  ALT  16  /  AlkPhos  105  12-25      LIVER FUNCTIONS - ( 25 Dec 2021 03:06 )  Alb: 3.1 g/dL / Pro: 6.6 g/dL / ALK PHOS: 105 U/L / ALT: 16 U/L / AST: 34 U/L / GGT: x                         Culture - Blood (collected 23 Dec 2021 06:33)  Source: .Blood Blood-Peripheral  Preliminary Report (25 Dec 2021 07:00):    No growth at 48 hours    Culture - Blood (collected 23 Dec 2021 04:08)  Source: .Blood Blood-Peripheral  Preliminary Report (25 Dec 2021 05:00):    No growth at 48 hours            CAPILLARY BLOOD GLUCOSE      POCT Blood Glucose.: 96 mg/dL (25 Dec 2021 07:53)  POCT Blood Glucose.: 123 mg/dL (25 Dec 2021 05:04)  POCT Blood Glucose.: 135 mg/dL (24 Dec 2021 23:06)  POCT Blood Glucose.: 123 mg/dL (24 Dec 2021 17:11)  POCT Blood Glucose.: 123 mg/dL (24 Dec 2021 12:39)      Neurosurgery Imaging:      Medications:  MEDICATIONS  (STANDING):  dextrose 40% Gel 15 Gram(s) Oral once  dextrose 5% + sodium chloride 0.9%. 1000 milliLiter(s) (75 mL/Hr) IV Continuous <Continuous>  dextrose 5%. 1000 milliLiter(s) (50 mL/Hr) IV Continuous <Continuous>  dextrose 5%. 1000 milliLiter(s) (100 mL/Hr) IV Continuous <Continuous>  dextrose 50% Injectable 25 Gram(s) IV Push once  dextrose 50% Injectable 12.5 Gram(s) IV Push once  dextrose 50% Injectable 25 Gram(s) IV Push once  glucagon  Injectable 1 milliGRAM(s) IntraMuscular once    MEDICATIONS  (PRN):  hydrALAZINE Injectable 10 milliGRAM(s) IV Push every 8 hours PRN SBP > 160      Assessment:  89m who presents following unwitnessed fall down 3 steps.  Found with incidental meningioma, possible trace left SAH - repeat was unable to evaluate for SAH as per radiologist.  Also found with age indeterminant T1 Pars Fracture.    Continue Neuro Checks  Maintain BP within desired range  Currently NPO - there is no contraindication to starting an appropriate enteric diet from a neurosurgical standpoint  Medical care per primary team  Awaits MRI read to determine acute vs. chronic T1 Pars Fracture.  CTH ordered for 12/25.  No acute neurosurgical intervention is indicated at this time, will continue to follow    Above is the established plan of care outlined by Dr. Oliveira    I have spent [-15-] minutes directly managing this patient.  I attest that >50% of this total time was spent counseling, and or coordinating care via review of relevant history, performing my own independent physical examination, personal review of images discussion with the multidisciplinary team and any consultants involved in this patient's care.  This management was performed directly at the patient's bedside or within the Neurocritical Care Unit.       HPI:  Patient is a 89 year old male with PMH of Dementia, BPH, GERD, CHF, Right Eye Blindness, Meningioma, and HTN (per documentation) who was BIBA after sustaining an unwitnessed fall. Patient with dementia and received ativan therefore unable to provide any history at this time. Family could not be reached at numbers provided, therefore all information obtained from the chart. Patient reportedly fell down 3 steps and denied hitting his head in the ED. CT head and neck with possible left frontal SAH/SDH and an acute right pars interarticularis fracture at T1. Patient was evaluated by neurosurgery and trauma surgery, who cleared the patient and recommended admission to medicine for syncope work up. Neurosurgery recommended repeat CT head, MRI of the t-spine and neurochecks q 4 hours. Labs with leukocytosis and elevated serum creatinine. Unclear baseline. Patient afebrile in the ED, cultures and UA ordered. Empirically started on antibiotics given concerns for aspiration PNA.  Unable to obtain any further information at this time. (23 Dec 2021 03:19)      Interval History:  24 Hour Events: Ethics consulted by primary team.  MRI performed, not read yet.    1. Patient's Neurologic Exam unchanged.    2. Patient's Hemodynamic's maintained without drips.    3. Patient's Respiratory status is: adequate    4. Patient is pending: MRI Read by Radiology to evaluate chronicity of fracture and repeat CTH.      Physical Exam:  Constitutional: NAD    Neuro  * Mental Status:  Awake, alert, conversational in Romanian, demented at baseline.   * Cranial Nerves: Cnii-Cnxii grossly intact  * Motor: RUE 5/5, LUE 5/5, RLE 5/5, LLE 5/5  * Sensory: Sensation intact to light touch  * Reflexes: Not assessed  * Gait: Not assessed    Vitals:  Vital Signs Last 24 Hrs  T(C): 37.1 (25 Dec 2021 08:07), Max: 37.4 (24 Dec 2021 15:49)  T(F): 98.8 (25 Dec 2021 08:07), Max: 99.3 (24 Dec 2021 15:49)  HR: 60 (25 Dec 2021 08:07) (60 - 101)  BP: 127/62 (25 Dec 2021 08:07) (121/- - 146/76)  BP(mean): --  RR: 18 (25 Dec 2021 08:07) (18 - 20)  SpO2: 96% (25 Dec 2021 08:07) (92% - 96%)    I&O:  I&O's Summary      Labs & Radiology:                        14.1   8.80  )-----------( 248      ( 25 Dec 2021 03:06 )             43.5       12-25    141  |  106  |  21.1<H>  ----------------------------<  140<H>  4.3   |  24.0  |  1.48<H>    Ca    9.0      25 Dec 2021 03:06    TPro  6.6  /  Alb  3.1<L>  /  TBili  0.7  /  DBili  x   /  AST  34  /  ALT  16  /  AlkPhos  105  12-25      LIVER FUNCTIONS - ( 25 Dec 2021 03:06 )  Alb: 3.1 g/dL / Pro: 6.6 g/dL / ALK PHOS: 105 U/L / ALT: 16 U/L / AST: 34 U/L / GGT: x                         Culture - Blood (collected 23 Dec 2021 06:33)  Source: .Blood Blood-Peripheral  Preliminary Report (25 Dec 2021 07:00):    No growth at 48 hours    Culture - Blood (collected 23 Dec 2021 04:08)  Source: .Blood Blood-Peripheral  Preliminary Report (25 Dec 2021 05:00):    No growth at 48 hours            CAPILLARY BLOOD GLUCOSE      POCT Blood Glucose.: 96 mg/dL (25 Dec 2021 07:53)  POCT Blood Glucose.: 123 mg/dL (25 Dec 2021 05:04)  POCT Blood Glucose.: 135 mg/dL (24 Dec 2021 23:06)  POCT Blood Glucose.: 123 mg/dL (24 Dec 2021 17:11)  POCT Blood Glucose.: 123 mg/dL (24 Dec 2021 12:39)      Neurosurgery Imaging:      Medications:  MEDICATIONS  (STANDING):  dextrose 40% Gel 15 Gram(s) Oral once  dextrose 5% + sodium chloride 0.9%. 1000 milliLiter(s) (75 mL/Hr) IV Continuous <Continuous>  dextrose 5%. 1000 milliLiter(s) (50 mL/Hr) IV Continuous <Continuous>  dextrose 5%. 1000 milliLiter(s) (100 mL/Hr) IV Continuous <Continuous>  dextrose 50% Injectable 25 Gram(s) IV Push once  dextrose 50% Injectable 12.5 Gram(s) IV Push once  dextrose 50% Injectable 25 Gram(s) IV Push once  glucagon  Injectable 1 milliGRAM(s) IntraMuscular once    MEDICATIONS  (PRN):  hydrALAZINE Injectable 10 milliGRAM(s) IV Push every 8 hours PRN SBP > 160      Assessment:  89m who presents following unwitnessed fall down 3 steps.  Found with incidental meningioma, possible trace left SAH - repeat was unable to evaluate for SAH as per radiologist.  Also found with age indeterminant T1 Pars Fracture.    Continue Neuro Checks  Maintain BP within desired range  Currently NPO - there is no contraindication to starting an appropriate enteric diet from a neurosurgical standpoint  Medical care per primary team  Awaits MRI read to determine acute vs. chronic T1 Pars Fracture.  CTH ordered for 12/25.  Continue cervical collar and spinal precautions  No acute neurosurgical intervention is indicated at this time, will continue to follow    Above is the established plan of care outlined by Dr. Oliveira    I have spent [-15-] minutes directly managing this patient.  I attest that >50% of this total time was spent counseling, and or coordinating care via review of relevant history, performing my own independent physical examination, personal review of images discussion with the multidisciplinary team and any consultants involved in this patient's care.  This management was performed directly at the patient's bedside or within the Neurocritical Care Unit.

## 2021-12-25 NOTE — PROGRESS NOTE ADULT - SUBJECTIVE AND OBJECTIVE BOX
[facility] Division of Hospital Medicine    Chief Complaint:    Fall    SUBJECTIVE / OVERNIGHT EVENTS:  No overnight events, HDS  Patient is verbal and answers most questions.     Patient denies chest pain, SOB, abd pain, N/V, fever, chills, dysuria or any other complaints. All remainder ROS negative.     MEDICATIONS  (STANDING):  dextrose 40% Gel 15 Gram(s) Oral once  dextrose 5% + sodium chloride 0.9%. 1000 milliLiter(s) (75 mL/Hr) IV Continuous <Continuous>  dextrose 5%. 1000 milliLiter(s) (50 mL/Hr) IV Continuous <Continuous>  dextrose 5%. 1000 milliLiter(s) (100 mL/Hr) IV Continuous <Continuous>  dextrose 50% Injectable 25 Gram(s) IV Push once  dextrose 50% Injectable 12.5 Gram(s) IV Push once  dextrose 50% Injectable 25 Gram(s) IV Push once  glucagon  Injectable 1 milliGRAM(s) IntraMuscular once    MEDICATIONS  (PRN):  hydrALAZINE Injectable 10 milliGRAM(s) IV Push every 8 hours PRN SBP > 160        I&O's Summary      PHYSICAL EXAM:  Vital Signs Last 24 Hrs  T(C): 37.1 (25 Dec 2021 08:07), Max: 37.4 (24 Dec 2021 15:49)  T(F): 98.8 (25 Dec 2021 08:07), Max: 99.3 (24 Dec 2021 15:49)  HR: 60 (25 Dec 2021 08:07) (60 - 101)  BP: 127/62 (25 Dec 2021 08:07) (121/- - 146/76)  BP(mean): --  RR: 18 (25 Dec 2021 08:07) (18 - 20)  SpO2: 96% (25 Dec 2021 08:07) (92% - 96%)        CONSTITUTIONAL: NAD, chronically ill appearing, Will cervical collar  ENMT: Moist oral mucosa, no pharyngeal injection or exudates; normal dentition  RESPIRATORY: Normal respiratory effort; lungs are clear to auscultation bilaterally, Audible upper airway secretions  CARDIOVASCULAR: Regular rate and rhythm, normal S1 and S2, no murmur/rub/gallop; Peripheral pulses are 2+ bilaterally  ABDOMEN: Nontender to palpation, normoactive bowel sounds, no rebound/guarding;   MUSCLOSKELETAL:  No clubbing or cyanosis of digits; no joint swelling or tenderness to palpation  PSYCH: A+O to person, affect appropriate  NEUROLOGY: CN 2-12 are intact and symmetric; no gross sensory deficits;   SKIN: No rashes; no palpable lesions    LABS:                        14.1   8.80  )-----------( 248      ( 25 Dec 2021 03:06 )             43.5         141  |  106  |  21.1<H>  ----------------------------<  140<H>  4.3   |  24.0  |  1.48<H>    Ca    9.0      25 Dec 2021 03:06    TPro  6.6  /  Alb  3.1<L>  /  TBili  0.7  /  DBili  x   /  AST  34  /  ALT  16  /  AlkPhos  105            Urinalysis Basic - ( 25 Dec 2021 08:39 )    Color: Yellow / Appearance: Clear / S.015 / pH: x  Gluc: x / Ketone: Negative  / Bili: Negative / Urobili: Negative mg/dL   Blood: x / Protein: 15 / Nitrite: Negative   Leuk Esterase: Negative / RBC: 0-2 /HPF / WBC 0-2   Sq Epi: x / Non Sq Epi: Occasional / Bacteria: Occasional        Culture - Blood (collected 23 Dec 2021 06:33)  Source: .Blood Blood-Peripheral  Preliminary Report (25 Dec 2021 07:00):    No growth at 48 hours    Culture - Blood (collected 23 Dec 2021 04:08)  Source: .Blood Blood-Peripheral  Preliminary Report (25 Dec 2021 05:00):    No growth at 48 hours      CAPILLARY BLOOD GLUCOSE      POCT Blood Glucose.: 96 mg/dL (25 Dec 2021 07:53)  POCT Blood Glucose.: 123 mg/dL (25 Dec 2021 05:04)  POCT Blood Glucose.: 135 mg/dL (24 Dec 2021 23:06)  POCT Blood Glucose.: 123 mg/dL (24 Dec 2021 17:11)  POCT Blood Glucose.: 123 mg/dL (24 Dec 2021 12:39)        RADIOLOGY & ADDITIONAL TESTS:  Results Reviewed:   Imaging Personally Reviewed:  Electrocardiogram Personally Reviewed:

## 2021-12-25 NOTE — PROGRESS NOTE ADULT - ASSESSMENT
Patient is a 89 year old male with PMH of Dementia, BPH, GERD, CHF, Right Eye Blindness, Meningioma, and HTN (per documentation) who was BIBA after sustaining an unwitnessed fall and found to have a left frontal SAH, SDH and stable meningiomas. Patient also found to have an acute right pars interarticularis fracture at T1. Evaluated by neurosurgery/trauma surgery and recommended medicine admission.     1. Traumatic Left Frontal SAH and Acute Parafalcine Subdural Hematoma.   -admit to SDU with q2 neurochecks  -unwitnessed fall; unclear if it was a mechanical fall vs syncopal event  -Initial CT head reviewed (as above)  -Repeat CT head with questionable new right frontal SAD  - Pending interval CTH non contrast  -Maintain Bedrest until cleared by neurosurgery  -hold chemical DVT ppx  -cardiac monitoring, fall precautions  TTE- LVEF- 50%. Grade 1 DD  -F/u MRI c-spine. Obtained consent from patient's attendant, Kelsi at [facility] on 12/23. Expedited for today (12/25)  -Neurosurgery recommendations- Pending MRI results  -PT evaluation once cleared by NSGY    2. Traumatic T1 Fracture  -CT neck with acute right pars interarticularis fracture at T1  -Neurosurgery recommending bedrest  -Also cervical collar was recommended to ensure no ligamentous instability   -Neurochecks q2 hours as above    3. Leukocytosis; now resolved. Likely reactive in s/o fracture  -patient with wet cough   -CXR- No acute radiographic findings  - s/p IV Zosyn  -bronchodilators as needed  -repeat CBC to monitor WBC count  FU Bcxs    4. Elevated creatinine; JESSE on CKD vs CKD- improving  -appears hypovolemic; likely component of intravascular volume depletion  -unclear baseline renal function  -creatinine 1.88 on admission  -judicious hydration  -monitor I/Os  -avoid nephrotoxic agents and renally dose medications    5. Dementia with behavioral disturbance  -patient impulsive and agitated in the ED  -ripped off cervical collar  -s/p IV ativan in the ED  -avoid benzos given elderly age  -constant observation  -supportive care  SLP consult given suspicion for dysphagia and dysphonia   Aspiration precautions    6. History of CHF   -appears hypovolemic  -S/p IV Lasix 20 mg, x 1 (12/24)  -strict I/Os, daily weights    7. History of HTN  -BP stable  -observe off PO anti-hypertensives  -IV hydralazine PRN    DVT ppx - SCDS  Dispo- Pending imaging studies.    Likely [facility] ([phone]).   Appreciate palliative recs- Ethics c/s placed to assist with medical decision making given that the patient has no family members.

## 2021-12-26 LAB
ANION GAP SERPL CALC-SCNC: 14 MMOL/L — SIGNIFICANT CHANGE UP (ref 5–17)
ANISOCYTOSIS BLD QL: SLIGHT — SIGNIFICANT CHANGE UP
BASOPHILS # BLD AUTO: 0.06 K/UL — SIGNIFICANT CHANGE UP (ref 0–0.2)
BASOPHILS NFR BLD AUTO: 0.7 % — SIGNIFICANT CHANGE UP (ref 0–2)
BUN SERPL-MCNC: 23.5 MG/DL — HIGH (ref 8–20)
CALCIUM SERPL-MCNC: 9.5 MG/DL — SIGNIFICANT CHANGE UP (ref 8.6–10.2)
CHLORIDE SERPL-SCNC: 106 MMOL/L — SIGNIFICANT CHANGE UP (ref 98–107)
CO2 SERPL-SCNC: 22 MMOL/L — SIGNIFICANT CHANGE UP (ref 22–29)
CREAT SERPL-MCNC: 1.32 MG/DL — HIGH (ref 0.5–1.3)
EOSINOPHIL # BLD AUTO: 0.19 K/UL — SIGNIFICANT CHANGE UP (ref 0–0.5)
EOSINOPHIL NFR BLD AUTO: 2.3 % — SIGNIFICANT CHANGE UP (ref 0–6)
GLUCOSE BLDC GLUCOMTR-MCNC: 177 MG/DL — HIGH (ref 70–99)
GLUCOSE BLDC GLUCOMTR-MCNC: 90 MG/DL — SIGNIFICANT CHANGE UP (ref 70–99)
GLUCOSE SERPL-MCNC: 98 MG/DL — SIGNIFICANT CHANGE UP (ref 70–99)
HCT VFR BLD CALC: 44.6 % — SIGNIFICANT CHANGE UP (ref 39–50)
HGB BLD-MCNC: 14.6 G/DL — SIGNIFICANT CHANGE UP (ref 13–17)
IMM GRANULOCYTES NFR BLD AUTO: 1.4 % — SIGNIFICANT CHANGE UP (ref 0–1.5)
LYMPHOCYTES # BLD AUTO: 0.81 K/UL — LOW (ref 1–3.3)
LYMPHOCYTES # BLD AUTO: 9.6 % — LOW (ref 13–44)
MACROCYTES BLD QL: SLIGHT — SIGNIFICANT CHANGE UP
MAGNESIUM SERPL-MCNC: 2 MG/DL — SIGNIFICANT CHANGE UP (ref 1.6–2.6)
MANUAL SMEAR VERIFICATION: SIGNIFICANT CHANGE UP
MCHC RBC-ENTMCNC: 32.7 GM/DL — SIGNIFICANT CHANGE UP (ref 32–36)
MCHC RBC-ENTMCNC: 34.5 PG — HIGH (ref 27–34)
MCV RBC AUTO: 105.4 FL — HIGH (ref 80–100)
MONOCYTES # BLD AUTO: 0.94 K/UL — HIGH (ref 0–0.9)
MONOCYTES NFR BLD AUTO: 11.1 % — SIGNIFICANT CHANGE UP (ref 2–14)
NEUTROPHILS # BLD AUTO: 6.32 K/UL — SIGNIFICANT CHANGE UP (ref 1.8–7.4)
NEUTROPHILS NFR BLD AUTO: 74.9 % — SIGNIFICANT CHANGE UP (ref 43–77)
OVALOCYTES BLD QL SMEAR: SLIGHT — SIGNIFICANT CHANGE UP
PHOSPHATE SERPL-MCNC: 2.5 MG/DL — SIGNIFICANT CHANGE UP (ref 2.4–4.7)
PLAT MORPH BLD: NORMAL — SIGNIFICANT CHANGE UP
PLATELET # BLD AUTO: 259 K/UL — SIGNIFICANT CHANGE UP (ref 150–400)
POIKILOCYTOSIS BLD QL AUTO: SLIGHT — SIGNIFICANT CHANGE UP
POLYCHROMASIA BLD QL SMEAR: SLIGHT — SIGNIFICANT CHANGE UP
POTASSIUM SERPL-MCNC: 4.4 MMOL/L — SIGNIFICANT CHANGE UP (ref 3.5–5.3)
POTASSIUM SERPL-SCNC: 4.4 MMOL/L — SIGNIFICANT CHANGE UP (ref 3.5–5.3)
RBC # BLD: 4.23 M/UL — SIGNIFICANT CHANGE UP (ref 4.2–5.8)
RBC # FLD: 11.6 % — SIGNIFICANT CHANGE UP (ref 10.3–14.5)
RBC BLD AUTO: ABNORMAL
SODIUM SERPL-SCNC: 142 MMOL/L — SIGNIFICANT CHANGE UP (ref 135–145)
WBC # BLD: 8.44 K/UL — SIGNIFICANT CHANGE UP (ref 3.8–10.5)
WBC # FLD AUTO: 8.44 K/UL — SIGNIFICANT CHANGE UP (ref 3.8–10.5)

## 2021-12-26 PROCEDURE — 99231 SBSQ HOSP IP/OBS SF/LOW 25: CPT

## 2021-12-26 PROCEDURE — 70450 CT HEAD/BRAIN W/O DYE: CPT | Mod: 26

## 2021-12-26 PROCEDURE — 99233 SBSQ HOSP IP/OBS HIGH 50: CPT

## 2021-12-26 PROCEDURE — 71045 X-RAY EXAM CHEST 1 VIEW: CPT | Mod: 26

## 2021-12-26 PROCEDURE — 99232 SBSQ HOSP IP/OBS MODERATE 35: CPT

## 2021-12-26 RX ORDER — AZITHROMYCIN 500 MG/1
500 TABLET, FILM COATED ORAL EVERY 24 HOURS
Refills: 0 | Status: DISCONTINUED | OUTPATIENT
Start: 2021-12-26 | End: 2021-12-29

## 2021-12-26 RX ORDER — SODIUM CHLORIDE 9 MG/ML
1000 INJECTION, SOLUTION INTRAVENOUS
Refills: 0 | Status: DISCONTINUED | OUTPATIENT
Start: 2021-12-26 | End: 2021-12-29

## 2021-12-26 RX ORDER — PIPERACILLIN AND TAZOBACTAM 4; .5 G/20ML; G/20ML
3.38 INJECTION, POWDER, LYOPHILIZED, FOR SOLUTION INTRAVENOUS EVERY 8 HOURS
Refills: 0 | Status: DISCONTINUED | OUTPATIENT
Start: 2021-12-26 | End: 2021-12-29

## 2021-12-26 RX ORDER — PIPERACILLIN AND TAZOBACTAM 4; .5 G/20ML; G/20ML
3.38 INJECTION, POWDER, LYOPHILIZED, FOR SOLUTION INTRAVENOUS ONCE
Refills: 0 | Status: COMPLETED | OUTPATIENT
Start: 2021-12-26 | End: 2021-12-26

## 2021-12-26 RX ADMIN — PIPERACILLIN AND TAZOBACTAM 200 GRAM(S): 4; .5 INJECTION, POWDER, LYOPHILIZED, FOR SOLUTION INTRAVENOUS at 16:38

## 2021-12-26 RX ADMIN — AZITHROMYCIN 255 MILLIGRAM(S): 500 TABLET, FILM COATED ORAL at 17:33

## 2021-12-26 RX ADMIN — SODIUM CHLORIDE 75 MILLILITER(S): 9 INJECTION, SOLUTION INTRAVENOUS at 11:01

## 2021-12-26 RX ADMIN — PIPERACILLIN AND TAZOBACTAM 25 GRAM(S): 4; .5 INJECTION, POWDER, LYOPHILIZED, FOR SOLUTION INTRAVENOUS at 22:45

## 2021-12-26 NOTE — PROGRESS NOTE ADULT - SUBJECTIVE AND OBJECTIVE BOX
[facility] Division of Hospital Medicine    Chief Complaint:    Fall    SUBJECTIVE / OVERNIGHT EVENTS:  Verbal reports of hypoxia overnight by RN. Patient was started on NC.   Patient states that he feels well. Denies pain or difficulty breathing. Denies that he has any neck fracture.    Patient denies chest pain, SOB, abd pain, N/V, fever, chills, dysuria or any other complaints. All remainder ROS negative.     MEDICATIONS  (STANDING):  dextrose 40% Gel 15 Gram(s) Oral once  dextrose 5% + sodium chloride 0.45%. 1000 milliLiter(s) (75 mL/Hr) IV Continuous <Continuous>  dextrose 5% + sodium chloride 0.9%. 1000 milliLiter(s) (75 mL/Hr) IV Continuous <Continuous>  dextrose 5%. 1000 milliLiter(s) (50 mL/Hr) IV Continuous <Continuous>  dextrose 5%. 1000 milliLiter(s) (100 mL/Hr) IV Continuous <Continuous>  dextrose 50% Injectable 25 Gram(s) IV Push once  dextrose 50% Injectable 12.5 Gram(s) IV Push once  dextrose 50% Injectable 25 Gram(s) IV Push once  glucagon  Injectable 1 milliGRAM(s) IntraMuscular once    MEDICATIONS  (PRN):  hydrALAZINE Injectable 10 milliGRAM(s) IV Push every 8 hours PRN SBP > 160        I&O's Summary    25 Dec 2021 07:01  -  26 Dec 2021 07:00  --------------------------------------------------------  IN: 0 mL / OUT: 500 mL / NET: -500 mL        PHYSICAL EXAM:  Vital Signs Last 24 Hrs  T(C): 36.8 (26 Dec 2021 08:05), Max: 37.2 (25 Dec 2021 15:55)  T(F): 98.3 (26 Dec 2021 08:05), Max: 98.9 (25 Dec 2021 15:55)  HR: 75 (26 Dec 2021 08:05) (75 - 93)  BP: 134/79 (26 Dec 2021 08:05) (119/74 - 150/73)  BP(mean): --  RR: 19 (26 Dec 2021 08:05) (18 - 19)  SpO2: 93% (26 Dec 2021 08:05) (93% - 96%)        CONSTITUTIONAL: NAD, chronically ill appearing, With cervical collar and NC  ENMT: Moist oral mucosa, no pharyngeal injection or exudates; normal dentition  RESPIRATORY: On NC, Normal respiratory effort; Bibasilar crackles on auscultation  CARDIOVASCULAR: Regular rate and rhythm, normal S1 and S2, no murmur/rub/gallop; Peripheral pulses are 2+ bilaterally  ABDOMEN: Nontender to palpation, normoactive bowel sounds, no rebound/guarding;   MUSCLOSKELETAL:  No clubbing or cyanosis of digits; no joint swelling or tenderness to palpation  PSYCH: A+O to person, place, but not time. Lacks insight. Affect appropriate  NEUROLOGY: CN 2-12 are intact and symmetric; no gross sensory deficits;   SKIN: No rashes; no palpable lesions    LABS:                        14.6   8.44  )-----------( 259      ( 26 Dec 2021 08:06 )             44.6     12-    142  |  106  |  23.5<H>  ----------------------------<  98  4.4   |  22.0  |  1.32<H>    Ca    9.5      26 Dec 2021 08:06  Phos  2.5     12-  Mg     2.0     -    TPro  6.6  /  Alb  3.1<L>  /  TBili  0.7  /  DBili  x   /  AST  34  /  ALT  16  /  AlkPhos  105  12-25          Urinalysis Basic - ( 25 Dec 2021 08:39 )    Color: Yellow / Appearance: Clear / S.015 / pH: x  Gluc: x / Ketone: Negative  / Bili: Negative / Urobili: Negative mg/dL   Blood: x / Protein: 15 / Nitrite: Negative   Leuk Esterase: Negative / RBC: 0-2 /HPF / WBC 0-2   Sq Epi: x / Non Sq Epi: Occasional / Bacteria: Occasional        CAPILLARY BLOOD GLUCOSE      POCT Blood Glucose.: 90 mg/dL (26 Dec 2021 05:10)  POCT Blood Glucose.: 89 mg/dL (25 Dec 2021 23:40)        RADIOLOGY & ADDITIONAL TESTS:  Results Reviewed:   Imaging Personally Reviewed:  Electrocardiogram Personally Reviewed:

## 2021-12-26 NOTE — PROGRESS NOTE ADULT - ASSESSMENT
Patient is a 89 year old male with PMH of Dementia, BPH, GERD, CHF, Right Eye Blindness, Meningioma, and HTN (per documentation) who was BIBA after sustaining an unwitnessed fall and found to have a left frontal SAH, SDH and stable meningiomas. Patient also found to have an acute right pars interarticularis fracture at T1. Evaluated by neurosurgery/trauma surgery and recommended medicine admission.     #Traumatic Left Frontal SAH and Acute Parafalcine Subdural Hematoma.   #Traumatic T1 Fracture  -unwitnessed fall; unclear if it was a mechanical fall vs syncopal event  - SAH stable on interval CT  -cardiac monitoring, fall precautions  -PT evaluation once cleared by NSGY  -Cervical Collar with thoracic extension for T1 fracture      #Acute Respiratory failure with hypoxia   Patient with upper airway secretions suspect potential aspiration however no fever or leukocytosis. May have component of mucous plugging.   Cont NC and wean as tolerated   CXR  Bronchodilators as needed  F/U Bcxs    #JESSE on CKD3  -creatinine 1.88 on admission  -monitor I/Os  -avoid nephrotoxic agents and renally dose medications  Improved after hydration    #Dementia with behavioral disturbance  -impulsive behavior, episodes of agitation removing cervical collar  -avoid benzos given elderly age  -constant observation  -supportive care  SLP consult given suspicion for dysphagia and dysphonia. NPO pending re-evaluation  Aspiration precautions    #HFpEF G1DD  -strict I/Os, daily weights  -Monitor volume status    #History of HTN  -BP stable  -observe off PO anti-hypertensives    DVT ppx - SCDS  Dispo- Pending improvement in oxygenation.  Likely [facility] ([phone]).   Appreciate palliative recs- Ethics c/s placed to assist with medical decision making given that the patient has no family members.

## 2021-12-26 NOTE — PROGRESS NOTE ADULT - ASSESSMENT
89m who presents following unwitnessed fall down 3 steps.    Found with incidental meningioma, possible trace left SAH - repeat reports stable SAH and meningioma.  Also found with age indeterminant T1 Pars Fracture -   MRI CS reposrts:  There is evidence of compression deformity seen involving the anterior aspect of T1. There is also suspected fracture seen involving the anterior inferior corner of this vertebral body with associated areas of T2 prolongation seen which is likely compatible with edema. Prevertebral soft tissue swelling seen in this region as well. This finding is likely compatible with a posttraumatic or osteoporotic compression fracture.  Abnormal T2 prolongation is identified in the posterior paraspinal soft tissue region extending from C7 to T2. Widening of the adjacent spinous processes identified as well. This is likely compatible with underlying ligamental injury.    No acute neurosurgical intervention is indicated at this time, will continue to follow  Cervical collar w Thoracic extension ordered - will be delivered, to we worn at all times   pt may be mobilized from Nsx standpoint w assist/ PT for dispo recommendations   Continue frequent Neuro Checks until the morning   Maintain BP within desired range - as per medical team  NPO - no contraindication to starting an appropriate enteric diet from a neurosurgical standpoint if passes dysphagia assessment   defer further care to primary team  case and plan d/w Dr Medel   w/u for aspiration PNA ongoing

## 2021-12-26 NOTE — PROGRESS NOTE ADULT - SUBJECTIVE AND OBJECTIVE BOX
NEUROSURGERY PROGRESS NOTE:    Pt s/p  POD#        pt seen and states is at baseline, denied any new or worsening sensorimotor changes    pt c/o + -headache  /10 on the pain scale   + - Nausea /+ - Vomiting  admits denies weakness  admits denies numbness/ tingling  admist denies visual changes  admist denies C/T/LS  Spine pain  + - void  + - BM  + OOB  + bed rest   + - diet  + roper cath to gravity   + - venodynes b/l when in bed   + - ANGELA HMV EVD drain  + - a-line/ TLC  + - tube feeds    MEDICATIONS  (STANDING):  azithromycin  IVPB 500 milliGRAM(s) IV Intermittent every 24 hours  dextrose 40% Gel 15 Gram(s) Oral once  dextrose 5% + sodium chloride 0.45%. 1000 milliLiter(s) (75 mL/Hr) IV Continuous <Continuous>  dextrose 5% + sodium chloride 0.9%. 1000 milliLiter(s) (75 mL/Hr) IV Continuous <Continuous>  dextrose 5%. 1000 milliLiter(s) (50 mL/Hr) IV Continuous <Continuous>  dextrose 5%. 1000 milliLiter(s) (100 mL/Hr) IV Continuous <Continuous>  dextrose 50% Injectable 25 Gram(s) IV Push once  dextrose 50% Injectable 12.5 Gram(s) IV Push once  dextrose 50% Injectable 25 Gram(s) IV Push once  glucagon  Injectable 1 milliGRAM(s) IntraMuscular once  piperacillin/tazobactam IVPB.. 3.375 Gram(s) IV Intermittent every 8 hours    MEDICATIONS  (PRN):  hydrALAZINE Injectable 10 milliGRAM(s) IV Push every 8 hours PRN SBP > 160    Allergies    No Known Allergies    Intolerances      Vital Signs Last 24 Hrs  T(C): 36.5 (26 Dec 2021 15:43), Max: 36.9 (25 Dec 2021 20:59)  T(F): 97.7 (26 Dec 2021 15:43), Max: 98.5 (26 Dec 2021 13:33)  HR: 107 (26 Dec 2021 15:43) (75 - 107)  BP: 139/86 (26 Dec 2021 15:43) (119/74 - 140/78)  BP(mean): --  RR: 20 (26 Dec 2021 15:43) (18 - 20)  SpO2: 95% (26 Dec 2021 15:43) (93% - 96%)          PHYSICAL EXAM:    GENERAL: NAD, well-groomed, well-developed, AAOx3 and very cooperative  HEAD:  Atraumatic, Normocephalic, no palpable step-off appreciated on palpation  EYES: b/l EOMI, PERRL, conjunctiva and sclera clear,   NECK: Supple, nontender to palpation  + FROM w no muscular soreness reported, neg B/B/K signs.  TS/LS: nontender to palpation midline or paraspinal muscles b/l, no pinpoint tenderness appreciated or paraspinal mm tenderness elicited on palpation b/l   NERVOUS SYSTEM:  Alert & Oriented X3, speech is clear and fluent, no dysarthria appreciated. Good concentration & very cooperative; Motor Strength 5/5 B/L upper and lower extremities, sensory is at baseline and symmetric b/l; No pronators or ankle clonus appreciated b/l, cerebellar signs grossly intact b/l. CN II-XII grossly intact b/l and symmetric; Spurling's test negative b/l; no benitez's b/l appreciated. no ankle clonus appreciated b/l.   EXTREMITIES:  2+ Peripheral Pulses, No clubbing, cyanosis, or edema, Hawkin's test negative b/l.   CHEST/LUNG: Clear to auscultation bilaterally; No rales, rhonchi, wheezing, or rubs  HEART: Regular rate and rhythm; +S1 & +S2  ABDOMEN: Soft, Nontender, Nondistended; Bowel sounds present  LYMPH: No lymphadenopathy noted  SKIN: No rashes or lesions        LABS:                        14.6   8.44  )-----------( 259      ( 26 Dec 2021 08:06 )             44.6         142  |  106  |  23.5<H>  ----------------------------<  98  4.4   |  22.0  |  1.32<H>    Ca    9.5      26 Dec 2021 08:06  Phos  2.5       Mg     2.0         TPro  6.6  /  Alb  3.1<L>  /  TBili  0.7  /  DBili  x   /  AST  34  /  ALT  16  /  AlkPhos  105        Urinalysis Basic - ( 25 Dec 2021 08:39 )    Color: Yellow / Appearance: Clear / S.015 / pH: x  Gluc: x / Ketone: Negative  / Bili: Negative / Urobili: Negative mg/dL   Blood: x / Protein: 15 / Nitrite: Negative   Leuk Esterase: Negative / RBC: 0-2 /HPF / WBC 0-2   Sq Epi: x / Non Sq Epi: Occasional / Bacteria: Occasional        RADIOLOGY & ADDITIONAL TESTS:  < from: CT Head No Cont (21 @ 09:57) >  ACC: [id number] EXAM:  CT BRAIN                          PROCEDURE DATE:  2021    INTERPRETATION:  CT OF THE HEAD WITHOUT CONTRAST    CLINICAL INDICATION: Follow-up head trauma.  TECHNIQUE: Volumetric CT acquisition was performed through the brain and reviewed using brain and bone window technique. Sagittal and coronal reconstructed images were obtained. Dose optimization techniques were utilized including kVp/mA modulation along with iterative reconstructions.      COMPARISON: CT brain, 2021, 1958 hours.    FINDINGS:  There is prominence of the ventricles, sulci and cisterns of the brain which may be age related.  There are scattered white matter hypodensities which are nonspecific but most commonly represent chronic microvascular ischemic changes.  There remains trace subarachnoid hemorrhage in the left frontal sulci superiorly, series 2 image 47. There is no midline shift or herniation.   Again noted is the left parafalcine frontoparietal hyperdense mass with punctate calcifications peripherally, essentially stable in size measuring 2.8 x 2.3 x 2.3 cm in AP, TRV and craniocaudal dimensions,   consistent with parafalcine meningioma.  There is no skull fracture. The visualized globes are symmetric bilaterally. There are retention cyst/polyps in the left maxillary sinus and mild to moderate mucosal thickening in the right maxillary sinus.   There are tympanomastoid effusions bilaterally.    IMPRESSION:  Compared to the previous examination,  There remains subarachnoid hemorrhage in the left frontal sulci.  Stable left parafalcine frontoparietal meningioma as described.    --- End of Report ---  ALLEGRA JULIEN MD; Attending Radiologist  This document has beenelectronically signed. Dec 26 2021 11:53AM  < end of copied text >      I spent a total time of 15 mins with the patient at bedside of which more than 50% of time was spent on counseling/coordination of care (Describe the nature of the counseling/coordination of care topics) NEUROSURGERY PROGRESS NOTE:  Patient is a 89 year old male with PMH of Dementia, BPH, GERD, CHF, Right Eye Blindness, Meningioma, and HTN (per documentation) who was BIBA after sustaining an unwitnessed fall. Patient with dementia and received ativan therefore unable to provide any history at this time. Family could not be reached at numbers provided, therefore all information obtained from the chart. Patient reportedly fell down 3 steps and denied hitting his head in the ED. CT head and neck with possible left frontal SAH/SDH and an acute right pars interarticularis fracture at T1. Patient was evaluated by neurosurgery and trauma surgery, who cleared the patient and recommended admission to medicine for syncope work up. Neurosurgery recommended repeat CT head, MRI of the t-spine and neurochecks q 4 hours. Labs with leukocytosis and elevated serum creatinine. Unclear baseline. Patient afebrile in the ED, cultures and UA ordered. Empirically started on antibiotics given concerns for aspiration PNA.  Unable to obtain any further information at this time. (23 Dec 2021 03:19)      Interval History:  pt on constant observation collar on   pt seen and states is at baseline, denied any new or worsening sensorimotor changes  no acute events overnight  per d/w medical team, w/u to r/o aPNA ongoing   -headache        MEDICATIONS  (STANDING):  azithromycin  IVPB 500 milliGRAM(s) IV Intermittent every 24 hours  dextrose 40% Gel 15 Gram(s) Oral once  dextrose 5% + sodium chloride 0.45%. 1000 milliLiter(s) (75 mL/Hr) IV Continuous <Continuous>  dextrose 5% + sodium chloride 0.9%. 1000 milliLiter(s) (75 mL/Hr) IV Continuous <Continuous>  dextrose 5%. 1000 milliLiter(s) (50 mL/Hr) IV Continuous <Continuous>  dextrose 5%. 1000 milliLiter(s) (100 mL/Hr) IV Continuous <Continuous>  dextrose 50% Injectable 25 Gram(s) IV Push once  dextrose 50% Injectable 12.5 Gram(s) IV Push once  dextrose 50% Injectable 25 Gram(s) IV Push once  glucagon  Injectable 1 milliGRAM(s) IntraMuscular once  piperacillin/tazobactam IVPB.. 3.375 Gram(s) IV Intermittent every 8 hours    MEDICATIONS  (PRN):  hydrALAZINE Injectable 10 milliGRAM(s) IV Push every 8 hours PRN SBP > 160    Allergies  No Known Allergies  Intolerances      Vital Signs Last 24 Hrs  T(C): 36.5 (26 Dec 2021 15:43), Max: 36.9 (25 Dec 2021 20:59)  T(F): 97.7 (26 Dec 2021 15:43), Max: 98.5 (26 Dec 2021 13:33)  HR: 107 (26 Dec 2021 15:43) (75 - 107)  BP: 139/86 (26 Dec 2021 15:43) (119/74 - 140/78)  BP(mean): --  RR: 20 (26 Dec 2021 15:43) (18 - 20)  SpO2: 95% (26 Dec 2021 15:43) (93% - 96%)          PHYSICAL EXAM:  Constitutional: NAD, comfortable in bed     Neuro  * Mental Status:  Awake, alert, conversational in Bengali, demented at baseline.   * Cranial Nerves: Cnii-Cnxii grossly intact  * Motor: RUE 5/5, LUE 5/5, RLE 5/5, LLE 5/5  * Sensory: Sensation intact to light touch  * Reflexes: Not assessed  * Gait: Not assessed  * cervical collar on       LABS:                        14.6   8.44  )-----------( 259      ( 26 Dec 2021 08:06 )             44.6     12    142  |  106  |  23.5<H>  ----------------------------<  98  4.4   |  22.0  |  1.32<H>    Ca    9.5      26 Dec 2021 08:06  Phos  2.5       Mg     2.0         TPro  6.6  /  Alb  3.1<L>  /  TBili  0.7  /  DBili  x   /  AST  34  /  ALT  16  /  AlkPhos  105  12-25      Urinalysis Basic - ( 25 Dec 2021 08:39 )    Color: Yellow / Appearance: Clear / S.015 / pH: x  Gluc: x / Ketone: Negative  / Bili: Negative / Urobili: Negative mg/dL   Blood: x / Protein: 15 / Nitrite: Negative   Leuk Esterase: Negative / RBC: 0-2 /HPF / WBC 0-2   Sq Epi: x / Non Sq Epi: Occasional / Bacteria: Occasional        RADIOLOGY & ADDITIONAL TESTS:  < from: CT Head No Cont (21 @ 09:57) >  ACC: [id number] EXAM:  CT BRAIN                          PROCEDURE DATE:  2021    INTERPRETATION:  CT OF THE HEAD WITHOUT CONTRAST    CLINICAL INDICATION: Follow-up head trauma.  TECHNIQUE: Volumetric CT acquisition was performed through the brain and reviewed using brain and bone window technique. Sagittal and coronal reconstructed images were obtained. Dose optimization techniques were utilized including kVp/mA modulation along with iterative reconstructions.      COMPARISON: CT brain, 2021, 1958 hours.    FINDINGS:  There is prominence of the ventricles, sulci and cisterns of the brain which may be age related.  There are scattered white matter hypodensities which are nonspecific but most commonly represent chronic microvascular ischemic changes.  There remains trace subarachnoid hemorrhage in the left frontal sulci superiorly, series 2 image 47. There is no midline shift or herniation.   Again noted is the left parafalcine frontoparietal hyperdense mass with punctate calcifications peripherally, essentially stable in size measuring 2.8 x 2.3 x 2.3 cm in AP, TRV and craniocaudal dimensions,   consistent with parafalcine meningioma.  There is no skull fracture. The visualized globes are symmetric bilaterally. There are retention cyst/polyps in the left maxillary sinus and mild to moderate mucosal thickening in the right maxillary sinus.   There are tympanomastoid effusions bilaterally.    IMPRESSION:  Compared to the previous examination, There remains subarachnoid hemorrhage in the left frontal sulci.  Stable left parafalcine frontoparietal meningioma as described.    --- End of Report ---  ALLEGRA JULIEN MD; Attending Radiologist  This document has beenelectronically signed. Dec 26 2021 11:53AM  < end of copied text >        ACC: [id number] EXAM: MR SPINE CERVICAL  PROCEDURE DATE: 2021  INTERPRETATION: Clinical indication: T1 fracture.  MRI of the cervical spine was performed using sagittal T1-T2 and STIR sequence. Axial T1 and T2-weighted sequences were performed as well as 2-D merge sequence. This exam is compared with prior CT scan of the cervical spine performed on 2021.    Loss of the normal cervical lordosis seen.  C4 is slightly anteriorly displaced relative to C5. This likely result of chronic degenerative change  Disc desiccation is seen throughout the cervical and upper thoracic spine region which secondary to chronic degenerative change    There is evidence of compression deformity seen involving the anterior aspect of T1. There is also suspected fracture seen involving the anterior inferior corner of this vertebral body with associated areas of T2 prolongation seen which is likely compatible with edema. Prevertebral soft tissue swelling seen in this region as well. This finding is likely compatible with a posttraumatic or osteoporotic compression fracture.    Abnormal T2 prolongation is identified in the posterior paraspinal soft tissue region extending from C7 to T2. Widening of the adjacent spinous processes identified as well. This is likely compatible with underlying ligamental injury.    C2-3: Disc bulge is identified. No significant commas of the spinal canal or either neural foramen  C3-4: Disc bulge and bilateral hypertrophic facet joint changes. Mild narrowing of the spinal canal. Moderate narrowing of both neural foramen  C4-5: Disc bulge and bilateral hypertrophic facet joint changes. Mild narrowing of the spinal canal. Moderate narrowing of both neural foramen  C5-6: Disc bulge and bilateral hypertrophic facet joint change seen. Moderate to severe narrowing of both neural foramen  C6-7: Disc bulge is identified. Mild to moderate narrowing of the spinal canal and both neural foramen  C7-T1: Normal  The spinal cord demonstrates normal signal and caliber.  Opacification of the left mastoid is seen which is likely compatible underlying inflammatory change.    IMPRESSION: Fracture involving the T1 vertebral body as described above.  Degenerative changes as described above.    --- End of Report ---  ROMAN MIKE MD; Attending Radiologist  This document has been electronically signed. Dec 25 2021 10:22AM        I spent a total time of 15 mins with the patient at bedside of which more than 50% of time was spent on counseling/coordination of care

## 2021-12-26 NOTE — CHART NOTE - NSCHARTNOTEFT_GEN_A_CORE
Patient was fit and delivered a CTLSO with additional soft sleeve protection to stabilize and protect the T1 compression fracture. Patient has possible dementia. anursing staff was educated on the care use and function of the orthosis. Contact info was left bedside. All went without incident.   Jose CAMACHO  [facility]  [phone]

## 2021-12-27 LAB
ANION GAP SERPL CALC-SCNC: 14 MMOL/L — SIGNIFICANT CHANGE UP (ref 5–17)
BASOPHILS # BLD AUTO: 0.05 K/UL — SIGNIFICANT CHANGE UP (ref 0–0.2)
BASOPHILS NFR BLD AUTO: 0.6 % — SIGNIFICANT CHANGE UP (ref 0–2)
BUN SERPL-MCNC: 26.6 MG/DL — HIGH (ref 8–20)
CALCIUM SERPL-MCNC: 9.6 MG/DL — SIGNIFICANT CHANGE UP (ref 8.6–10.2)
CHLORIDE SERPL-SCNC: 104 MMOL/L — SIGNIFICANT CHANGE UP (ref 98–107)
CO2 SERPL-SCNC: 23 MMOL/L — SIGNIFICANT CHANGE UP (ref 22–29)
CREAT SERPL-MCNC: 1.63 MG/DL — HIGH (ref 0.5–1.3)
EOSINOPHIL # BLD AUTO: 0.12 K/UL — SIGNIFICANT CHANGE UP (ref 0–0.5)
EOSINOPHIL NFR BLD AUTO: 1.5 % — SIGNIFICANT CHANGE UP (ref 0–6)
GLUCOSE BLDC GLUCOMTR-MCNC: 116 MG/DL — HIGH (ref 70–99)
GLUCOSE BLDC GLUCOMTR-MCNC: 133 MG/DL — HIGH (ref 70–99)
GLUCOSE SERPL-MCNC: 132 MG/DL — HIGH (ref 70–99)
HCT VFR BLD CALC: 45.3 % — SIGNIFICANT CHANGE UP (ref 39–50)
HGB BLD-MCNC: 14.9 G/DL — SIGNIFICANT CHANGE UP (ref 13–17)
IMM GRANULOCYTES NFR BLD AUTO: 1.2 % — SIGNIFICANT CHANGE UP (ref 0–1.5)
LYMPHOCYTES # BLD AUTO: 0.8 K/UL — LOW (ref 1–3.3)
LYMPHOCYTES # BLD AUTO: 9.9 % — LOW (ref 13–44)
MAGNESIUM SERPL-MCNC: 2.2 MG/DL — SIGNIFICANT CHANGE UP (ref 1.6–2.6)
MCHC RBC-ENTMCNC: 32.9 GM/DL — SIGNIFICANT CHANGE UP (ref 32–36)
MCHC RBC-ENTMCNC: 34.6 PG — HIGH (ref 27–34)
MCV RBC AUTO: 105.1 FL — HIGH (ref 80–100)
MONOCYTES # BLD AUTO: 0.85 K/UL — SIGNIFICANT CHANGE UP (ref 0–0.9)
MONOCYTES NFR BLD AUTO: 10.5 % — SIGNIFICANT CHANGE UP (ref 2–14)
MRSA PCR RESULT.: SIGNIFICANT CHANGE UP
NEUTROPHILS # BLD AUTO: 6.15 K/UL — SIGNIFICANT CHANGE UP (ref 1.8–7.4)
NEUTROPHILS NFR BLD AUTO: 76.3 % — SIGNIFICANT CHANGE UP (ref 43–77)
PHOSPHATE SERPL-MCNC: 2.4 MG/DL — SIGNIFICANT CHANGE UP (ref 2.4–4.7)
PLATELET # BLD AUTO: 241 K/UL — SIGNIFICANT CHANGE UP (ref 150–400)
POTASSIUM SERPL-MCNC: 4.3 MMOL/L — SIGNIFICANT CHANGE UP (ref 3.5–5.3)
POTASSIUM SERPL-SCNC: 4.3 MMOL/L — SIGNIFICANT CHANGE UP (ref 3.5–5.3)
RBC # BLD: 4.31 M/UL — SIGNIFICANT CHANGE UP (ref 4.2–5.8)
RBC # FLD: 11.5 % — SIGNIFICANT CHANGE UP (ref 10.3–14.5)
S AUREUS DNA NOSE QL NAA+PROBE: SIGNIFICANT CHANGE UP
SODIUM SERPL-SCNC: 141 MMOL/L — SIGNIFICANT CHANGE UP (ref 135–145)
WBC # BLD: 8.07 K/UL — SIGNIFICANT CHANGE UP (ref 3.8–10.5)
WBC # FLD AUTO: 8.07 K/UL — SIGNIFICANT CHANGE UP (ref 3.8–10.5)

## 2021-12-27 PROCEDURE — 99497 ADVNCD CARE PLAN 30 MIN: CPT

## 2021-12-27 PROCEDURE — 99233 SBSQ HOSP IP/OBS HIGH 50: CPT

## 2021-12-27 PROCEDURE — 99232 SBSQ HOSP IP/OBS MODERATE 35: CPT

## 2021-12-27 RX ORDER — MORPHINE SULFATE 50 MG/1
2 CAPSULE, EXTENDED RELEASE ORAL EVERY 4 HOURS
Refills: 0 | Status: DISCONTINUED | OUTPATIENT
Start: 2021-12-27 | End: 2021-12-29

## 2021-12-27 RX ORDER — METOPROLOL TARTRATE 50 MG
12.5 TABLET ORAL DAILY
Refills: 0 | Status: DISCONTINUED | OUTPATIENT
Start: 2021-12-27 | End: 2021-12-29

## 2021-12-27 RX ORDER — MORPHINE SULFATE 50 MG/1
5 CAPSULE, EXTENDED RELEASE ORAL EVERY 4 HOURS
Refills: 0 | Status: DISCONTINUED | OUTPATIENT
Start: 2021-12-27 | End: 2021-12-27

## 2021-12-27 RX ORDER — SODIUM CHLORIDE 9 MG/ML
500 INJECTION, SOLUTION INTRAVENOUS ONCE
Refills: 0 | Status: COMPLETED | OUTPATIENT
Start: 2021-12-27 | End: 2021-12-27

## 2021-12-27 RX ADMIN — Medication 12.5 MILLIGRAM(S): at 14:11

## 2021-12-27 RX ADMIN — AZITHROMYCIN 255 MILLIGRAM(S): 500 TABLET, FILM COATED ORAL at 16:24

## 2021-12-27 RX ADMIN — SODIUM CHLORIDE 75 MILLILITER(S): 9 INJECTION, SOLUTION INTRAVENOUS at 05:16

## 2021-12-27 RX ADMIN — SODIUM CHLORIDE 100 MILLILITER(S): 9 INJECTION, SOLUTION INTRAVENOUS at 12:23

## 2021-12-27 RX ADMIN — PIPERACILLIN AND TAZOBACTAM 25 GRAM(S): 4; .5 INJECTION, POWDER, LYOPHILIZED, FOR SOLUTION INTRAVENOUS at 22:46

## 2021-12-27 RX ADMIN — Medication 0.5 MILLIGRAM(S): at 16:24

## 2021-12-27 RX ADMIN — PIPERACILLIN AND TAZOBACTAM 25 GRAM(S): 4; .5 INJECTION, POWDER, LYOPHILIZED, FOR SOLUTION INTRAVENOUS at 05:17

## 2021-12-27 RX ADMIN — PIPERACILLIN AND TAZOBACTAM 25 GRAM(S): 4; .5 INJECTION, POWDER, LYOPHILIZED, FOR SOLUTION INTRAVENOUS at 14:11

## 2021-12-27 NOTE — PROGRESS NOTE ADULT - ATTENDING COMMENTS
NSGY Attg:    see above    patient seen and examined    agree with exam and plan as above    f/u with Dr. Oliveira as outpatient in 2 weeks  recall prn
NSGY Attg:    see above    patient seen and examined    agree with exam and plan as above  c collar at all times  c collar with extension when OOB

## 2021-12-27 NOTE — PROGRESS NOTE ADULT - SUBJECTIVE AND OBJECTIVE BOX
OVERNIGHT EVENTS: patient seen with use of pacific interpreters via telephone. patient delirious, afebrile, cleared for pureed diet but no liquids.     Present Symptoms:     Dyspnea: none   Nausea/Vomiting: No  Anxiety:  No  Depression: unable   Fatigue: Yes   Loss of appetite: No  Constipation: none     Pain: none             Character-            Duration-            Effect-            Factors-            Frequency-            Location-            Severity-    Pain AD Score:  [email or web address] (press ctrl + left click to view)    Review of Systems: Reviewed                Unable to obtain due to poor mentation   All others negative    MEDICATIONS  (STANDING):  azithromycin  IVPB 500 milliGRAM(s) IV Intermittent every 24 hours  dextrose 40% Gel 15 Gram(s) Oral once  dextrose 5% + sodium chloride 0.45%. 1000 milliLiter(s) (75 mL/Hr) IV Continuous <Continuous>  dextrose 5% + sodium chloride 0.9%. 1000 milliLiter(s) (75 mL/Hr) IV Continuous <Continuous>  dextrose 5%. 1000 milliLiter(s) (50 mL/Hr) IV Continuous <Continuous>  dextrose 5%. 1000 milliLiter(s) (100 mL/Hr) IV Continuous <Continuous>  dextrose 50% Injectable 25 Gram(s) IV Push once  dextrose 50% Injectable 12.5 Gram(s) IV Push once  dextrose 50% Injectable 25 Gram(s) IV Push once  glucagon  Injectable 1 milliGRAM(s) IntraMuscular once  LORazepam    Concentrate 0.5 milliGRAM(s) SubLingual every 8 hours  metoprolol tartrate 12.5 milliGRAM(s) Oral daily  piperacillin/tazobactam IVPB.. 3.375 Gram(s) IV Intermittent every 8 hours    MEDICATIONS  (PRN):  hydrALAZINE Injectable 10 milliGRAM(s) IV Push every 8 hours PRN SBP > 160  LORazepam   Injectable 0.5 milliGRAM(s) IV Push every 4 hours PRN Agitation  morphine Concentrate 5 milliGRAM(s) Oral every 4 hours PRN pain    PHYSICAL EXAM:    Vital Signs Last 24 Hrs  T(C): 36.7 (27 Dec 2021 12:15), Max: 36.9 (26 Dec 2021 13:33)  T(F): 98 (27 Dec 2021 12:15), Max: 98.5 (26 Dec 2021 13:33)  HR: 76 (27 Dec 2021 12:15) (65 - 107)  BP: 148/71 (27 Dec 2021 12:15) (124/66 - 148/71)  BP(mean): --  RR: 18 (27 Dec 2021 12:15) (18 - 20)  SpO2: 96% (27 Dec 2021 12:15) (93% - 96%)    General: alert, able to follow simple commands but delirious. c collar in place     Karnofsky:  30 %    HEENT: normal     Lungs: comfortable     CV: normal      GI: normal     : normal     MSK: weakness     Skin: no rash    LABS:                      14.9   8.07  )-----------( 241      ( 27 Dec 2021 08:13 )             45.3     12-27    141  |  104  |  26.6<H>  ----------------------------<  132<H>  4.3   |  23.0  |  1.63<H>    Ca    9.6      27 Dec 2021 08:13  Phos  2.4     12-27  Mg     2.2     12-27    I&O's Summary    26 Dec 2021 07:01  -  27 Dec 2021 07:00  --------------------------------------------------------  IN: 975 mL / OUT: 400 mL / NET: 575 mL    RADIOLOGY & ADDITIONAL STUDIES:    < from: CT Head No Cont (12.26.21 @ 09:57) >  IMPRESSION:    Compared to the previous examination,    There remains subarachnoid hemorrhage in the left frontal sulci.    Stable left parafalcine frontoparietal meningioma as described.    --- End of Report ---    < end of copied text >    ADVANCE DIRECTIVES/TREATMENT PREFERENCES:  now DNR and DNI - 2 physician

## 2021-12-27 NOTE — SWALLOW BEDSIDE ASSESSMENT ADULT - SLP GENERAL OBSERVATIONS
Pt received A&A on stretcher in ED, reduced cognition, +C-collar,  +Tajik speaking, language line [id number] used for interpretation, 0x1,  poor command following, able to state name and stated "yeah" when asked if hungry, b/l hand mitts, 1:1 at bedside. Pt required encouragement to accept PO trials, as he frequently stated "no," however able to be redirected to accept puree/moderately thick liquid trials.  Nonverbal pain 0/10 pre/post eval.

## 2021-12-27 NOTE — CHART NOTE - NSCHARTNOTEFT_GEN_A_CORE
Palliative care social work note.    Clinical reviewed and case discussed with DR Ramírez and DR Garay. SW aware patient resides at [facility] which is not under [facility] affiliation. Patient previously resided at [facility] and after clinical review was also at [facility]. SW contacted [facility] to inquire if any contact information listed for patient. None at [facility] other then Ciro Clark who is owner of [facility]. in previous medical records it was reported by patient that he had son Williams who lived in [place] and other children in [place]. No numbers ever provided in previous charts. SW aware Dr Garay has reached out to [facility] SW to inquire in their records. SW spoke with [facility] who conformed that in 5 years patient has resided there that there has been no family contact. ETELVINA obtained previous  for patient Ms Bernstein . ETELVINA left message to inquire if her records reported any family when overseeing patient for [facility] program.  Ciro Clark contact information- cell is .

## 2021-12-27 NOTE — SWALLOW BEDSIDE ASSESSMENT ADULT - ORAL PHASE
reduced attention to bolus, suspected posterior loss of bolus reduced attention to bolus with mildly increased oral transit time,  however adequate A-P movement of bolus.

## 2021-12-27 NOTE — PROGRESS NOTE ADULT - ASSESSMENT
89M with dementia, diastolic chf, prior femoral neck fracture s/p right hip hemiarthroplasty (10/2019) from [facility] here with fall found to have left SAH.     #1 Dementia - unclear baseline. patient agitated, unable to ascertain at this time.   #2 Left SAH - stable per repeat CT 12/26   #3 Agitation - needs symptomatic management - add ativan SL and IV if SL does not help  #4 Dyspnea - related to aspiration pnuemonia - on antibiotics  #5 Dysphagia - cleared for dysphagia puree diet, but no liquids as of now.   #6 Encounter for palliative care - Given patient's age and comorbidities, overall prognosis is poor and patient is hospice appropriate. Aggressive measures such as CPR or mechnical ventilation would not be medically therapeutic. That being said, patient is a bit more alert today in comparison to my initial visit with him on Thursday, and he seems to be clinically responded to conservative measures such as anitbiotics and IV fluids. I have attempted to discuss goals of care with him via Ecuadorean interpreters but he is delirious and with known history of dementia, he lacks medical decision making capacity at this time. I then attempted to ascertain any legal surrogates, initially when asked " do you have any family or friends who help make medical decisions," he stated " No." When further questions if he had any children, he stated " of course" but then was unable to supply me with any names or phone numbers. Ethics involved to confer with adult home and ensure that patient has no legal surrogates at this time.     Plan will be to attempt symptomatic management for 24 hours and see if patient has improvement. DNR and DNI put in to place given 2 physician agreement and patient lacking surrogate decision maker.

## 2021-12-27 NOTE — PROGRESS NOTE ADULT - ASSESSMENT
Patient is a 89 year old male with PMH of Dementia, BPH, GERD, CHF, Right Eye Blindness, Meningioma, and HTN (per documentation) who was BIBA after sustaining an unwitnessed fall and found to have a left frontal SAH, SDH and stable meningiomas. Patient also found to have an acute right pars interarticularis fracture at T1. Evaluated by neurosurgery/trauma surgery and recommended medicine admission.     #Traumatic Left Frontal SAH and Acute Parafalcine Subdural Hematoma.   #Traumatic T1 Fracture  - SAH stable on interval CT  -cardiac monitoring, fall precautions  -Cervical Collar with thoracic extension for T1 fracture    #Acute Respiratory failure with hypoxia  #Complex PNA suspect Gram Negative Bacteria   Patient with upper airway secretions suspect potential aspiration however no fever or leukocytosis. May have component of mucous plugging.   Cont NC and wean as tolerated   CXR with b/l hilar infiltrates  Zosyn  F/U cxs    #NSVT   No Electrolyte abnormality  Suspect in setting of infection  Start oral metoprolol and titrate as needed    #JESSE on CKD3  -creatinine 1.88 on admission  -monitor I/Os  -avoid nephrotoxic agents and renally dose medications  Improved after hydration    #Dementia with behavioral disturbance  #Dysphagia   -impulsive behavior, episodes of agitation removing cervical collar  -avoid benzos given elderly age  -constant observation  -supportive care  SLP consult given suspicion for dysphagia and dysphonia.   Puree Diet, No liquids  Aspiration precautions    #HFpEF G1DD  -strict I/Os, daily weights  -Monitor volume status    #History of HTN  -BP stable    DVT ppx - SCDS  Dispo- Remains Acute. Dispo  Code Status: DNR/DNI - by Two attending consent. Patient is high risk for mortality given his dementia with unpredictable behavior, spinal fracture and dysphagia with aspiration. Aggressive measures are more likely to cause more harm and suffering than benefit.   Mentation has improved to some degree with medical management. Will cont to treat for the above problems and observe course prior to determination of placement.   Palliative and Ethics is following to assist with medical decision making given that the patient has no family members.

## 2021-12-27 NOTE — PROGRESS NOTE ADULT - PARTICIPANTS
Patient's condition and circumstances reviewed in detail with IDT and Palliative Team. Patient prognosis was evaluated given his current ongoing dementia, aspiration and spinal fractures. The decision was made by 2 attending consent to make the patient DNR DNI to avoid further suffering and harm in such circumstance. Time Spent 35 minutes.

## 2021-12-27 NOTE — CONSULT NOTE ADULT - ASSESSMENT
89 y/o s/p unwitnessed fall with small tSAH; T1 pars fx  - rpt CTH in 6 hrs   - MRI Cspine   -recommend hard cervical collar until MRI to ensure no ligamentous instability   -recommend bedrest   -? medicine eval for syncope as fall unwitnessed and patient unreliable with history and exam  - will follow  
Recommendations: Thank you for including the Ethics Consultation Service in this challenging case. Ethics commends the team for their virtue in the care and support for . Flavio Starr. Both [facility] law, FHCDA and current ethical thinking support the right of two physicians to make medical decisions that include DNR/DNI as well as secondary treatment decisions including hospice care. Physicians are NOT obligated to perform procedures that are not in accordance with acceptable practice and where harm exceeds benefits. A decision to order hospice care must meet the appropriate criteria. It is ethically appropriate to order hospice care during this admission as the patient’s trajectory is at the end of life and although technically feasible is not medically reasonable to restore a goal of improving functional recovery and palliation is warranted. Due to overall poor prognosis patient made DNR and DNI by 2 physician consent today.    Ethics will continue to work with Palliative Care and the primary team.  Thank you for this challenging case. Please do not hesitate to call us if any questions.   Ethics Service will remain available for further conversation about the patient’s current condition and decision-points that may occur.     CASE DISCUSSED with ATTENDING: Collette Garay MS, MD, MPH, [facility].  More than 50% of the time of this consultation was spent in coordination of Care of Patient.  REFERENCES (I) Pope VIRGEN. Five Things Clinicians Should Know When Caring for Unrepresented Patients. AMA J Ethics 2019;21(7):K715-831. (ii) [facility] § 2994-g (2020). (iii) NO[place] STORE[place]. THE FAMILY HEALTH CARE DECISIONS ACT. [facility] HEALTH LAW JOURNAL,2010;15:32-35. (iv) [facility] § 2994-g.5(b). 
89 year old Man with PMH of Dementia, right eye blindness, CHF, HTN,  s/p fall, was witnessed, unclear story, unable to obtain it from the patient due to his dementia.  1ry intact, 2ry intact, imaging with possible SAH and SDH, and age indeterminate T1 fracture.  Neurosurgery consulted recs for repeat imaging and an MRI    Patient is cleared from trauma.  
Recommendations:  Thank you for including the Ethics Consultation Service in this challenging case. Ethics commends the team for their virtue in the care and support for Mr. Flavio Starr.  Further conversations and investigation ongoing, but to date it appears this patient is unbefriended, but not under the auspices of [facility] the [facility] nor OPD and as such two physicians may authorize major medical treatment (as above).     In addition, clinical prognostication in progress. Patient awaiting MRI of c-spine. Further discussions regarding plan of care including advanced care planning for an unbefriended patient are warranted. Ethics to work with Palliative Care.     OF NOTE, PATIENT MAY ONLY SPEAK [place] AS A  WAS NECESSARY IN A PRIOR ADMISSION.     Thank you for this challenging case. Please do not hesitate to call us if any questions.   ETHICS SERVICE WILL REMAIN AVAILABLE FOR FURTHER CONVERSATION ABOUT THE PATIENT’S CURRENT CONDITION AND DECISION-POINTS THAT MAY OCCUR.     CASE DISCUSSED with ATTENDING: Collette Garay MS, MD, MPH, [facility].  More than 50% of the time of this consultation was spent in coordination of Care of Patient.  REFERENCES  (i) Jono TL & Marcela JF. Principles of Biomedical Ethics. [place]: [employer]; 2019.    (ii) [place] Pub[place] Health Law § 2994-a et seq. (2020).  (iii) [place] Pub[place] Health Law § 2994-d.4    (iv) [place] Pub[place] § 2994-d.1.  (v) [place] Pub[place] Health [place] § 2994-a.27.  
89M with dementia, diastolic chf, prior femoral neck fracture s/p right hip hemiarthroplasty (10/2019) from [facility] here with fall found to have left SAH.     #1 Dementia - unclear baseline. patient agitated, unable to ascertain at this time.   #2 Left SAH - MRI pending. neurosurgery following. Patient does not appear to be a good candidate for any kind of aggressive interventions.   #3 Agitation - needs symptomatic management  #4 Encounter for palliative care - recommend Ethics consultation to assist in surrogacy and who would aid in decision making for this patient. Would also check into if he is residing at an [facility] overseen facility.

## 2021-12-27 NOTE — CONSULT NOTE ADULT - SUBJECTIVE AND OBJECTIVE BOX
Consult requested by: BANDAR Pope MD   Role: Attending   Service: Medicine      Current Attending: Dr. BANDAR Medel, Medicine  OTHER MD: VEE Ramírez DO, Palliative Care Attending  Consultant: Adina Schwab, RPA-C (Ethics Fellow) [phone] or (O) [phone]    Consult purpose:  Assist the team in the ethical dilemma posed by an unbefriended 89 year old male, s/p fall now with acute illness, regarding goals of care.  Clinical summary: (See Initial consult 12/23/21) This is a 89 year old male with past medical history of Dementia, BPH, GERD, diastolic CHF, Right Eye Blindness, Meningioma, HTN (per documentation) and s/p right hip ORIF (open reduction internal fixation) for femoral neck fracture (10/2019) who presented to the ED after sustaining an unwitnessed fall down 3 steps. CT head and neck showed a left frontal SAH, Acute Parafalcine SDH and stable meningiomas. Imaging also revealed to have an acute right pars interarticularis fracture at T1.  Patient was evaluated by neurosurgery with the recommendation for MRI of C-spine. MRI completed on 12/24 and showed T1 vertebral fracture, likely ligamental injury extending from C7-T2, and degenerative changes. No surgical intervention warranted per Neurosurgery. C collar initiated although patient is having a hard time keeping it on. Follow up CT head on 12/26 is stable/improved.  Patient came from an adult home with no known family or contacts. Unable to provide any info for family or friends to primary team or palliative care when asked. He was cleared for a pureed diet by speech and swallow today. Per palliative care and the primary team, the patient is somewhat improved today. He is more awake and conversive but still confused. As per Medicine Attending, patient is high risk for mortality given his dementia with unpredictable behavior, spinal fracture and dysphagia with aspiration. Also noted that aggressive measures are more likely to cause more harm and suffering than benefit. As per Palliative Care, given patient's age and comorbidities, overall prognosis is poor and patient is hospice appropriate. Palliative Care also noted that aggressive measures such as CPR or mechanical ventilation would not be medically therapeutic. Continuation of Ethics consult to assist in end of life decision making for an unbefriended patient deemed without capacity, given the patient’s current state and overall poor prognosis.    Prognosis Estimate (survival in hrs, days, wks, mos, yrs): poor due to acute illness and progressive dementia & dysphagia  Patient Decision-Making Capacity:  Has limited capacity  Lacks capacity (per the primary team and Palliative care)  Patient Aware of:  Diagnosis:   Yes    No   Unknown    Prognosis:   Yes    No   Unknown       Name of medical decision-maker should patient lack capacity: None  Role:   Health Care Agent      Legal Surrogate	  Other stakeholders: None   Other Decision-Maker (i.e., HCA or Surrogate) Aware of:  Diagnosis: Yes   No      Prognosis:   Yes     No   Evidence of Patient’s Preference of Life-Sustaining Treatment (Written or Oral):   Resuscitation status:   DNR:  Yes   No      DNI:  Yes   No Completed via 2 MD consent on 12/27/21 AS A RESULT OF THIS CONSULT    Emailed REBEL James LCSW ([facility]) on 12/27/21: Need to follow up on patient’s admission at [facility] over 5 years ago.   Discussion with GUANACO Garay MD & DIOMEDES Anders LCSW (Palliative Care SW) on 12/27/21: Case discussed thus far regarding investigation of surrogates. Awaiting call back from Group Home Administrator and [facility] who returns to work tomorrow.   Discussion with Adina Schwab (Ethics Fellow) and Ciro Clark ( at [facility]) on 12/27/21: He is not aware of any contacts but will review the chart to be sure.  Discussion with GUANACO Garay MD (Ethicist) and Dr. Medel (Medicine Attending) and VEE Ramírez DO (Palliative Care Attending) on 12/27/21: Case discussed thus far. Aware of son and maybe other children, but no contacts in our records. Per [facility], no contacts, calls or visits in 5 years. Patient does not have a phone. Have reached out to [facility] and will have an answer tomorrow. Patient has improved mentation today, but with spinal fracture, dementia and dysphagia and meets criteria for hospice. Dr. Ramírez checked with patient regarding surrogates. At first he said no, then said a son, but no contact info. To discuss with SW Manager at [facility].   Discussion with GUANACO Garay MD (Ethicist) and DIOMEDES Varela LCSW (SW Manager at [facility]) on 12/27/21: Discussed case thus far. Unbefriended with current symptoms and hospice appropriate. Place hospice evaluation.   Discussion with GUANACO Garay MD (Ethicist) and Dr. Medel (Medicine Attending) and VEE Ramírez DO (Palliative Care Attending) on 12/27/21: Updated on conversation with DIOMEDES Varela LCSW from [facility].   Discussion with GUANACO Garay MD & DIOMEDES Anders LCSW (Palliative Care SW) on 12/27/21: Updated on conversations thus far. DIOMEDES Anders has also reached out to prior CM. Ethics and Palliative continue to do due diligence in locating surrogates. To update tomorrow.     Bioethics analysis: THE CENTRAL ETHICAL ISSUE IS THE CONFLICT INVOLVING (A) PATIENT AUTONOMY AND PROVIDER BENEFICENCE, ON THE ONE HAND, AND (B) PROVIDER NONMALEFICENECE, ON THE OTHER HAND.  The conflict that exists in this situation is one hospital clinicians infrequently encounter in terminally ill patients who have poor prognosis for recovery but are without family members or surrogates to share decisions regarding aggressive life-sustaining treatments (LST’s) versus appropriate hospice placement. Mr. Flavio Starr does not have decisional capacity due to his dementia, and does not have a surrogate or legal guardian. In this case, due to his advanced dementia, vertebral fracture, and dysphagia/aspiration patient’s prognosis is deemed to be poor.  At one pole, is the bioethical principle of autonomy. In tension at the other pole is the physician’s duty of non-maleficence – with interventions that appear to neither have little long-term benefit nor restore a functional quality of life for a specific patient and may increase the patient’s suffering or prolong the dying process. Clinicians may variably interpret the principle of beneficence ["do good"] in this kind of situation, depending on their own values about the commitment to sustain life. Medical decision making for patients with neither decision-making capacity (DMC) nor a surrogate decision-maker presents an ethical challenge for healthcare providers because there is no way to obtain informed consent for treatment. This is particularly so when these decisions involve invasive/life threatening procedures or withholding or withdrawing life-sustaining treatments and providing appropriate palliative care. In this case, the health team is commended for their virtue and invoking justice – by providing fairness and equitable care while attempting to locate a surrogate. Our continued investigation until this point has not been successful in identifying any surrogates for this patient. Therefore, there is no surrogate to assist the medical team using the shared decision-making model to determine the level of care going forward.  This patient is unbefriended. Unbefriended patients are extremely vulnerable since they are unable to speak for themselves and lack the social [place] to advocate for them. If not ethically managed, these patients end up with overtreatment ("maximum medical intervention whether and ethically warranted"), undertreatment ("some clinicians may decide that treatment is inappropriate and unilaterally withhold or withdraw") or with delayed treatment ("some clinicians will wait until an emergency when consent is implied").(i) This creates the unfortunate issue of providing medical treatment that is inconsistent with the patient’s preferences or best interests. Under [place] law, for unbefriended patients that lack decision making capacity and do not receive services under the Office of Mental Health or the Office of People with Developmental Disabilities, consent for any proposed treatments, can be obtained from an attending practitioner and at least one other physician, nurse practitioner or physician assistant.(ii)  The bioethical principle of beneficence calls on clinicians to respect patient autonomy and to honor and preserve Mr. Flavio Starr’s sense of dignity and personhood, his moral status. Clinicians may variably argue that beneficence calls for further aggressive interventions. Beneficence aims to promote the best possible health or quality of living or dying: with aggressive interventions when these help prolong life with "good quality", with comfort care when LST’s are not possible and the aim is to achieve the best "quality of dying." The clinician’s duty to non-maleficence means avoiding medical interventions whose risk and burden exceeds their benefit ("first do no harm"). As this patient lacks capacity, it is appropriate for protection of his autonomy.  As per Medicine Attending, patient is high risk for mortality given his dementia with unpredictable behavior, spinal fracture and dysphagia with aspiration. Also noted that aggressive measures are more likely to cause more harm and suffering than benefit. As per Palliative Care, given patient's age and comorbidities, overall prognosis is poor and patient is hospice appropriate. Palliative Care also noted that aggressive measures such as CPR or mechanical ventilation would not be medically therapeutic.  The 2010 [facility] Family Health Care Decisions Act (FHDCA)(iii) addresses the situation of a sick individual who lacks capacity and has no available surrogate. The [facility] requires hospitals, after a patient is admitted, to determine if the patient has a health care agent, guardian, or a person who can serve as the patient’s surrogate. If the patient has no such person and lacks capacity, the hospital must identify, to the extent practical, the patient’s wishes and preferences about pending health care decisions. In this case, the patient is terminally ill and is currently incapacitated due to dementia, from making medical decisions. There is no known family or other surrogate decision makers at this time willing to make decisions for the patient.  A decision to withdraw or withhold life-sustaining treatment including MOLST orders can only be made after determination that the treatment offers the patient no medical benefit because the patient will die imminently, even if the treatment is provided, and the provision of the treatment would violate accepted medical standards. In this case, patient remains with all life sustaining treatments. Yet the provision of escalating treatments that would be reasonably deemed inhumane or cause extraordinary burden violates acceptable standards of medical care.  [facility] Law imposes some constraints on patients’ and clinicians’ decisions about forgoing life sustaining treatments (LSTs). When a patient is terminally ill and survival is in doubt, the [facility] authorizes the decision makers to forgo life-sustaining treatment, in accordance with the patient’s best interests. Under the [facility], a decision to withhold LSTs must meet either criteria:  • Criterion 1: Treatment would be an extraordinary burden to the patient  AND: (Select a or b)  a. The patient has an illness or injury which can be expected to cause death within six months,  whether or not treatment is provided; OR  b. The patient is permanently unconscious.  • Criterion 2: The patient has an irreversible or incurable condition; AND the provision of treatment  would involve such pain, suffering or other burden that it would reasonably be deemed inhumane or  extraordinarily burdensome under the circumstances.  This decision must be made by the attending practitioner with the independent concurrence of another physician, nurse practitioner or physician assistant.(iv)  In this specific case, in accordance with the PALLIATIVE CARE ACCESS ACT ([facility] SECTION 2997-D) the attending health care practitioner is required to provide patients with a terminal illness not only prognosis, risks and benefits of treatment options but also patient’s legal rights to symptom management at the end of life. Fundamental to a palliative approach is the aim to reduce suffering and improve the quality of life for dying patients. Assembly Bill  signed on August 13, 2015 amended Public Health Law ([facility]) 2994-g Subdivision 5-a2 states that an attending physician shall be authorized to make decisions regarding hospice care and execute appropriate documents for such decisions (including a hospice election form) for an adult patient who is hospice eligible.   The attending practitioner can make decisions regarding hospice taking into account the patient’s wishes, or if they are not known, in the patient’s best interest; with the concurring opinion by another practitioner; and approval by an ethics review committee.(iv)  Utilizing the concept of proportionality or balanced central norm a clinician must have an extremely good reason – usually one in which the benefit markedly exceeds any risk for an intervention – to compel the surrogate or this patient to impose continuance of treatment which most likely will for this patient mean that his condition will remain unchanged or with minimal improvement.  In honoring the patient’s moral status, the issue remains that the patient has a poor prognosis with limited potential for recovery. Therefore, after meaningful attempts to find an appropriate surrogate, the health team must ponder whether the continuation and/or escalation of care is likely to ensure a favorable outcome and restoration of functional recovery. Physicians are under no obligation to continue therapy that is likely to yield no benefit. Interventions which cause distress, limit privacy, and impose burdens do not conform to the principle of non-maleficence should be closely reviewed by a risk-benefit analysis.  At present the patient’s dementia is terminal and irreversible, and any treatment being considered must provide benefit in excess of harm. To ensure there is no bias and nondiscrimination on this gentleman who has dementia and is without surrogates; objective criteria must be used to withhold intubation, resuscitation, nutrition and hydration.

## 2021-12-27 NOTE — SWALLOW BEDSIDE ASSESSMENT ADULT - SLP PERTINENT HISTORY OF CURRENT PROBLEM
Patient is a 89 year old male with PMH of Dementia, BPH, GERD, CHF, Right Eye Blindness, Meningioma, and HTN (per documentation) who was BIBA after sustaining an unwitnessed fall and found to have a left frontal SAH, SDH and stable meningiomas. Patient also found to have an acute right pars interarticularis fracture at T1. Evaluated by neurosurgery/trauma surgery and recommended medicine admission.

## 2021-12-27 NOTE — CONSULT NOTE ADULT - CONSULT REASON
Assist the team in the ethical dilemma posed by a 89 year old male, s/p fall now with acute illness and no known family, regarding appropriate surrogate decision maker.
fall
" GOC and hospice evaluation)
small tsah/ ? falx SDH; T1 fx
Assist the team in the ethical dilemma posed by an unbefriended 89 year old male, s/p fall now with acute illness, regarding goals of care.

## 2021-12-27 NOTE — SWALLOW BEDSIDE ASSESSMENT ADULT - SWALLOW EVAL: DIAGNOSIS
At least mild oral dysphagia confounded by reduced cognition, functional for puree. Pharyngeal stage of swallow also appears functional for puree with no overt s/s aspiration across administered trials. Suspect pharyngeal dysphagia with moderately thickened liquids.

## 2021-12-27 NOTE — PROGRESS NOTE ADULT - SUBJECTIVE AND OBJECTIVE BOX
INTERVAL HPI/OVERNIGHT EVENTS:  Pt s/p fall.  Admitted on 12/23  Pt lying in the ED with collar and extension in place.   Pt follows simple commands with lots of encouragement. Positive confusion     MEDICATIONS  (STANDING):  azithromycin  IVPB 500 milliGRAM(s) IV Intermittent every 24 hours  dextrose 40% Gel 15 Gram(s) Oral once  dextrose 5% + sodium chloride 0.45%. 1000 milliLiter(s) (75 mL/Hr) IV Continuous <Continuous>  dextrose 5% + sodium chloride 0.9%. 1000 milliLiter(s) (75 mL/Hr) IV Continuous <Continuous>  dextrose 5%. 1000 milliLiter(s) (50 mL/Hr) IV Continuous <Continuous>  dextrose 5%. 1000 milliLiter(s) (100 mL/Hr) IV Continuous <Continuous>  dextrose 50% Injectable 25 Gram(s) IV Push once  dextrose 50% Injectable 12.5 Gram(s) IV Push once  dextrose 50% Injectable 25 Gram(s) IV Push once  glucagon  Injectable 1 milliGRAM(s) IntraMuscular once  lactated ringers Bolus 500 milliLiter(s) IV Bolus once  metoprolol tartrate 12.5 milliGRAM(s) Oral daily  piperacillin/tazobactam IVPB.. 3.375 Gram(s) IV Intermittent every 8 hours    MEDICATIONS  (PRN):  hydrALAZINE Injectable 10 milliGRAM(s) IV Push every 8 hours PRN SBP > 160    Allergies  No Known Allergies  Intolerances    Vital Signs Last 24 Hrs  T(C): 36.4 (27 Dec 2021 04:00), Max: 36.9 (26 Dec 2021 13:33)  T(F): 97.6 (27 Dec 2021 04:00), Max: 98.5 (26 Dec 2021 13:33)  HR: 65 (27 Dec 2021 04:00) (65 - 107)  BP: 124/66 (27 Dec 2021 04:00) (124/66 - 140/78)  BP(mean): --  RR: 20 (27 Dec 2021 04:00) (19 - 20)  SpO2: 95% (27 Dec 2021 04:00) (93% - 95%) BMI (kg/m2): 31 (12-22-21 @ 16:49)      PHYSICAL EXAM  GENERAL: NAD, well-groomed, well-developed  HEAD:  Atraumatic, Normocephalic  EYES: EOMI, PERRLA, conjunctiva and sclera clear  ENMT: No tonsillar erythema, exudates, or enlargement; Moist mucous membranes, Good dentition, No lesions  NECK: collar with extension inplace.   NERVOUS SYSTEM:  Alert & Oriented self,  Motor Strength 5/5 B/L upper and lower extremities; DTRs 2+ intact and symmetric  CHEST/LUNG: Clear bs bilaterally;  HEART: Regular rate and rhythm; No murmurs, rubs, or gallops  ABDOMEN: Soft, Nontender, Nondistended; Bowel sounds present  EXTREMITIES:  2+ Peripheral Pulses, No edema      LABS:                        14.9   8.07  )-----------( 241      ( 27 Dec 2021 08:13 )             45.3     12-27    141  |  104  |  26.6<H>  ----------------------------<  132<H>  4.3   |  23.0  |  1.63<H>    Ca    9.6      27 Dec 2021 08:13  Phos  2.4     12-27  Mg     2.2     12-27          I&O's Detail    26 Dec 2021 07:01  -  27 Dec 2021 07:00  --------------------------------------------------------  IN:    dextrose 5% + sodium chloride 0.45%: 975 mL  Total IN: 975 mL    OUT:    Voided (mL): 400 mL  Total OUT: 400 mL    Total NET: 575 mL      RADIOLOGY & ADDITIONAL TESTS:  < from: CT Head No Cont (12.26.21 @ 09:57) >  ACC: [id number] EXAM:  CT BRAIN                        PROCEDURE DATE:  12/26/2021    IMPRESSION:  Compared to the previous examination,  There remains subarachnoid hemorrhage in the left frontal sulci.  Stable left parafalcine frontoparietal meningioma as described.  --- End of Report ---  < end of copied text >    < from: MR Cervical Spine No Cont (12.24.21 @ 15:26) >  ACC: [id number] EXAM:  MR SPINE CERVICAL                        PROCEDURE DATE:  12/24/2021    IMPRESSION: Fracture involving the T1 vertebral body as described above.  Degenerative changes as described above.  --- End of Report ---  < end of copied text >

## 2021-12-27 NOTE — SWALLOW BEDSIDE ASSESSMENT ADULT - SWALLOW EVAL: RECOMMENDED FEEDING/EATING TECHNIQUES
allow for swallow between intakes/check mouth frequently for oral residue/pocketing/crush medication (when feasible)/oral hygiene/position upright (90 degrees)/small sips/bites

## 2021-12-27 NOTE — PROGRESS NOTE ADULT - SUBJECTIVE AND OBJECTIVE BOX
[facility] Division of Hospital Medicine    Chief Complaint:    Fall    SUBJECTIVE / OVERNIGHT EVENTS:  Yesterday started on empiric abx for aspiration PNA  Patient with episode of NSVT overnight.   Today patient is awake but not conversational in a meaningful way.  Disorganized in bed    Patient denies chest pain, SOB, abd pain, N/V, fever, chills, dysuria or any other complaints. All remainder ROS negative.     MEDICATIONS  (STANDING):  azithromycin  IVPB 500 milliGRAM(s) IV Intermittent every 24 hours  dextrose 40% Gel 15 Gram(s) Oral once  dextrose 5% + sodium chloride 0.45%. 1000 milliLiter(s) (75 mL/Hr) IV Continuous <Continuous>  dextrose 5% + sodium chloride 0.9%. 1000 milliLiter(s) (75 mL/Hr) IV Continuous <Continuous>  dextrose 5%. 1000 milliLiter(s) (50 mL/Hr) IV Continuous <Continuous>  dextrose 5%. 1000 milliLiter(s) (100 mL/Hr) IV Continuous <Continuous>  dextrose 50% Injectable 25 Gram(s) IV Push once  dextrose 50% Injectable 12.5 Gram(s) IV Push once  dextrose 50% Injectable 25 Gram(s) IV Push once  glucagon  Injectable 1 milliGRAM(s) IntraMuscular once  LORazepam   Injectable 0.5 milliGRAM(s) IV Push every 6 hours  metoprolol tartrate 12.5 milliGRAM(s) Oral daily  piperacillin/tazobactam IVPB.. 3.375 Gram(s) IV Intermittent every 8 hours    MEDICATIONS  (PRN):  hydrALAZINE Injectable 10 milliGRAM(s) IV Push every 8 hours PRN SBP > 160  LORazepam   Injectable 0.5 milliGRAM(s) IV Push every 4 hours PRN Agitation  morphine  - Injectable 2 milliGRAM(s) IV Push every 4 hours PRN dyspnea or pain        I&O's Summary    26 Dec 2021 07:01  -  27 Dec 2021 07:00  --------------------------------------------------------  IN: 975 mL / OUT: 400 mL / NET: 575 mL        PHYSICAL EXAM:  Vital Signs Last 24 Hrs  T(C): 36.7 (27 Dec 2021 12:15), Max: 36.8 (27 Dec 2021 00:00)  T(F): 98 (27 Dec 2021 12:15), Max: 98.3 (27 Dec 2021 00:00)  HR: 76 (27 Dec 2021 12:15) (65 - 107)  BP: 148/71 (27 Dec 2021 12:15) (124/66 - 148/71)  BP(mean): --  RR: 18 (27 Dec 2021 12:15) (18 - 20)  SpO2: 96% (27 Dec 2021 12:15) (94% - 96%)        CONSTITUTIONAL: NAD, Chronically ill appearing, with c-collar in place   ENMT: Moist oral mucosa, no pharyngeal injection or exudates; normal dentition  RESPIRATORY: Normal respiratory effort; lungs are coarse to auscultation bilaterally  CARDIOVASCULAR: Regular rate and rhythm, normal S1 and S2, no murmur/rub/gallop; Peripheral pulses are 2+ bilaterally  ABDOMEN: Nontender to palpation, normoactive bowel sounds, no rebound/guarding;   MUSCLOSKELETAL:  No clubbing or cyanosis of digits; no joint swelling or tenderness to palpation  PSYCH: A+O to person but not place or time; affect appropriate  NEUROLOGY: CN 2-12 are intact and symmetric; no gross sensory deficits;   SKIN: No rashes; no palpable lesions    LABS:                        14.9   8.07  )-----------( 241      ( 27 Dec 2021 08:13 )             45.3     12-27    141  |  104  |  26.6<H>  ----------------------------<  132<H>  4.3   |  23.0  |  1.63<H>    Ca    9.6      27 Dec 2021 08:13  Phos  2.4     12-27  Mg     2.2     12-27                CAPILLARY BLOOD GLUCOSE      POCT Blood Glucose.: 133 mg/dL (27 Dec 2021 11:59)  POCT Blood Glucose.: 116 mg/dL (27 Dec 2021 03:48)  POCT Blood Glucose.: 177 mg/dL (26 Dec 2021 16:48)        RADIOLOGY & ADDITIONAL TESTS:  Results Reviewed:   Imaging Personally Reviewed:  Electrocardiogram Personally Reviewed:

## 2021-12-28 LAB
ANION GAP SERPL CALC-SCNC: 13 MMOL/L — SIGNIFICANT CHANGE UP (ref 5–17)
BASOPHILS # BLD AUTO: 0.04 K/UL — SIGNIFICANT CHANGE UP (ref 0–0.2)
BASOPHILS NFR BLD AUTO: 0.6 % — SIGNIFICANT CHANGE UP (ref 0–2)
BUN SERPL-MCNC: 21 MG/DL — HIGH (ref 8–20)
CALCIUM SERPL-MCNC: 9.1 MG/DL — SIGNIFICANT CHANGE UP (ref 8.6–10.2)
CHLORIDE SERPL-SCNC: 104 MMOL/L — SIGNIFICANT CHANGE UP (ref 98–107)
CO2 SERPL-SCNC: 26 MMOL/L — SIGNIFICANT CHANGE UP (ref 22–29)
CREAT SERPL-MCNC: 1.47 MG/DL — HIGH (ref 0.5–1.3)
CULTURE RESULTS: SIGNIFICANT CHANGE UP
CULTURE RESULTS: SIGNIFICANT CHANGE UP
EOSINOPHIL # BLD AUTO: 0.25 K/UL — SIGNIFICANT CHANGE UP (ref 0–0.5)
EOSINOPHIL NFR BLD AUTO: 3.5 % — SIGNIFICANT CHANGE UP (ref 0–6)
GLUCOSE SERPL-MCNC: 126 MG/DL — HIGH (ref 70–99)
HCT VFR BLD CALC: 44.3 % — SIGNIFICANT CHANGE UP (ref 39–50)
HGB BLD-MCNC: 14.3 G/DL — SIGNIFICANT CHANGE UP (ref 13–17)
IMM GRANULOCYTES NFR BLD AUTO: 1.4 % — SIGNIFICANT CHANGE UP (ref 0–1.5)
LYMPHOCYTES # BLD AUTO: 0.88 K/UL — LOW (ref 1–3.3)
LYMPHOCYTES # BLD AUTO: 12.4 % — LOW (ref 13–44)
MAGNESIUM SERPL-MCNC: 2 MG/DL — SIGNIFICANT CHANGE UP (ref 1.6–2.6)
MCHC RBC-ENTMCNC: 32.3 GM/DL — SIGNIFICANT CHANGE UP (ref 32–36)
MCHC RBC-ENTMCNC: 34.2 PG — HIGH (ref 27–34)
MCV RBC AUTO: 106 FL — HIGH (ref 80–100)
MONOCYTES # BLD AUTO: 0.84 K/UL — SIGNIFICANT CHANGE UP (ref 0–0.9)
MONOCYTES NFR BLD AUTO: 11.8 % — SIGNIFICANT CHANGE UP (ref 2–14)
NEUTROPHILS # BLD AUTO: 5.01 K/UL — SIGNIFICANT CHANGE UP (ref 1.8–7.4)
NEUTROPHILS NFR BLD AUTO: 70.3 % — SIGNIFICANT CHANGE UP (ref 43–77)
PHOSPHATE SERPL-MCNC: 2.6 MG/DL — SIGNIFICANT CHANGE UP (ref 2.4–4.7)
PLATELET # BLD AUTO: 255 K/UL — SIGNIFICANT CHANGE UP (ref 150–400)
POTASSIUM SERPL-MCNC: 4.1 MMOL/L — SIGNIFICANT CHANGE UP (ref 3.5–5.3)
POTASSIUM SERPL-SCNC: 4.1 MMOL/L — SIGNIFICANT CHANGE UP (ref 3.5–5.3)
RBC # BLD: 4.18 M/UL — LOW (ref 4.2–5.8)
RBC # FLD: 11.6 % — SIGNIFICANT CHANGE UP (ref 10.3–14.5)
SODIUM SERPL-SCNC: 143 MMOL/L — SIGNIFICANT CHANGE UP (ref 135–145)
SPECIMEN SOURCE: SIGNIFICANT CHANGE UP
SPECIMEN SOURCE: SIGNIFICANT CHANGE UP
WBC # BLD: 7.12 K/UL — SIGNIFICANT CHANGE UP (ref 3.8–10.5)
WBC # FLD AUTO: 7.12 K/UL — SIGNIFICANT CHANGE UP (ref 3.8–10.5)

## 2021-12-28 PROCEDURE — 99232 SBSQ HOSP IP/OBS MODERATE 35: CPT

## 2021-12-28 PROCEDURE — 99233 SBSQ HOSP IP/OBS HIGH 50: CPT

## 2021-12-28 RX ADMIN — SODIUM CHLORIDE 75 MILLILITER(S): 9 INJECTION, SOLUTION INTRAVENOUS at 11:33

## 2021-12-28 RX ADMIN — PIPERACILLIN AND TAZOBACTAM 25 GRAM(S): 4; .5 INJECTION, POWDER, LYOPHILIZED, FOR SOLUTION INTRAVENOUS at 21:13

## 2021-12-28 RX ADMIN — Medication 0.5 MILLIGRAM(S): at 11:39

## 2021-12-28 RX ADMIN — Medication 0.5 MILLIGRAM(S): at 00:41

## 2021-12-28 RX ADMIN — Medication 0.5 MILLIGRAM(S): at 23:16

## 2021-12-28 RX ADMIN — AZITHROMYCIN 255 MILLIGRAM(S): 500 TABLET, FILM COATED ORAL at 18:02

## 2021-12-28 RX ADMIN — PIPERACILLIN AND TAZOBACTAM 25 GRAM(S): 4; .5 INJECTION, POWDER, LYOPHILIZED, FOR SOLUTION INTRAVENOUS at 13:30

## 2021-12-28 NOTE — PROGRESS NOTE ADULT - ASSESSMENT
89yr man hx  dementia, DHF, prior femoral neck fracture s/p right hip hemiarthroplasty (10/2019) from [facility] here with fall found to have left SAH.     #1 Dementia - unclear baseline.  Currently AOx1,  without capacity to make decisions. Assist with care  #2 Left SAH - stable per repeat CT 12/26. Monitor for pain. Morphine 2mg IVP PRN  #3 Agitation - needs symptomatic management - Continue Ativan 0.5mg IVP q6 hr with PRN  #4 Dyspnea - related to aspiration pneumonia - on antibiotics  #5 Dysphagia - cleared for dysphagia puree diet, but no liquids as of now.  Consider comfort feeds  #6 Encounter for palliative care -  Patient without capacity to make medical decisions secondary to SDH and dementia.  No reported surrogates could be located.  Overall poor prognosis, given baseline comorbidities now with SAH.    Plan for [facility] with hospice services.    Forms completed with Dr. Medel  89yr man hx  dementia, DHF, prior femoral neck fracture s/p right hip hemiarthroplasty (10/2019) from [facility] here with fall found to have left SAH.     #1 Dementia - unclear baseline.  Currently AOx1,  without capacity to make decisions. Assist with care  #2 Left SAH - stable per repeat CT 12/26. Monitor for pain. Morphine 2mg IVP PRN  #3 Agitation - needs symptomatic management - Continue Ativan 0.5mg IVP q6 hr with PRN  #4 Dyspnea - related to aspiration pneumonia - on antibiotics  #5 Dysphagia - cleared for dysphagia puree diet, but no liquids as of now.  Consider comfort feeds  #6 Encounter for palliative care -  Patient without capacity to make medical decisions secondary to SAH and dementia.  No reported surrogates could be located.  Overall poor prognosis, given baseline comorbidities now with SAH.    Plan for [facility] with hospice services.    Forms completed with Dr. Medel

## 2021-12-28 NOTE — PROGRESS NOTE ADULT - SUBJECTIVE AND OBJECTIVE BOX
[facility] Division of Hospital Medicine    Chief Complaint:    Fall    SUBJECTIVE / OVERNIGHT EVENTS:  No overnight events  Pleasant Comfortable    Patient denies chest pain, abd pain, N/V, fever, chills, dysuria or any other complaints. All remainder ROS negative.     MEDICATIONS  (STANDING):  azithromycin  IVPB 500 milliGRAM(s) IV Intermittent every 24 hours  dextrose 40% Gel 15 Gram(s) Oral once  dextrose 5% + sodium chloride 0.45%. 1000 milliLiter(s) (75 mL/Hr) IV Continuous <Continuous>  dextrose 5% + sodium chloride 0.9%. 1000 milliLiter(s) (75 mL/Hr) IV Continuous <Continuous>  dextrose 5%. 1000 milliLiter(s) (50 mL/Hr) IV Continuous <Continuous>  dextrose 5%. 1000 milliLiter(s) (100 mL/Hr) IV Continuous <Continuous>  dextrose 50% Injectable 25 Gram(s) IV Push once  dextrose 50% Injectable 12.5 Gram(s) IV Push once  dextrose 50% Injectable 25 Gram(s) IV Push once  glucagon  Injectable 1 milliGRAM(s) IntraMuscular once  LORazepam   Injectable 0.5 milliGRAM(s) IV Push every 6 hours  metoprolol tartrate 12.5 milliGRAM(s) Oral daily  piperacillin/tazobactam IVPB.. 3.375 Gram(s) IV Intermittent every 8 hours    MEDICATIONS  (PRN):  hydrALAZINE Injectable 10 milliGRAM(s) IV Push every 8 hours PRN SBP > 160  LORazepam   Injectable 0.5 milliGRAM(s) IV Push every 4 hours PRN Agitation  morphine  - Injectable 2 milliGRAM(s) IV Push every 4 hours PRN dyspnea or pain        I&O's Summary    27 Dec 2021 07:01  -  28 Dec 2021 07:00  --------------------------------------------------------  IN: 675 mL / OUT: 200 mL / NET: 475 mL    28 Dec 2021 07:01  -  28 Dec 2021 13:38  --------------------------------------------------------  IN: 475 mL / OUT: 0 mL / NET: 475 mL        PHYSICAL EXAM:  Vital Signs Last 24 Hrs  T(C): 36.4 (28 Dec 2021 11:25), Max: 36.8 (27 Dec 2021 15:51)  T(F): 97.6 (28 Dec 2021 11:25), Max: 98.2 (27 Dec 2021 15:51)  HR: 79 (28 Dec 2021 11:25) (51 - 101)  BP: 123/86 (28 Dec 2021 11:25) (123/68 - 169/78)  BP(mean): 98 (28 Dec 2021 11:25) (7 - 98)  RR: 18 (28 Dec 2021 11:25) (18 - 19)  SpO2: 100% (28 Dec 2021 11:25) (95% - 100%)        CONSTITUTIONAL: Laying in bed supine, chronically ill appearing   ENMT: Moist oral mucosa, no pharyngeal injection or exudates; normal dentition  RESPIRATORY: Normal respiratory effort; lungs with basilar crackles  CARDIOVASCULAR: Regular rate and rhythm, normal S1 and S2, no murmur/rub/gallop; Peripheral pulses are 2+ bilaterally  ABDOMEN: Nontender to palpation, normoactive bowel sounds, no rebound/guarding;   MUSCLOSKELETAL:  No clubbing or cyanosis of digits; no joint swelling or tenderness to palpation  PSYCH: A+O to person, but not place/time  affect appropriate  NEUROLOGY: CN 2-12 are intact and symmetric; no gross sensory deficits;   SKIN: No rashes; no palpable lesions    LABS:                        14.3   7.12  )-----------( 255      ( 28 Dec 2021 03:29 )             44.3     12-28    143  |  104  |  21.0<H>  ----------------------------<  126<H>  4.1   |  26.0  |  1.47<H>    Ca    9.1      28 Dec 2021 03:29  Phos  2.6     12-28  Mg     2.0     12-28                CAPILLARY BLOOD GLUCOSE            RADIOLOGY & ADDITIONAL TESTS:  Results Reviewed:   Imaging Personally Reviewed:  Electrocardiogram Personally Reviewed:

## 2021-12-28 NOTE — PROGRESS NOTE ADULT - ASSESSMENT
Patient is a 89 year old male with PMH of Dementia, BPH, GERD, CHF, Right Eye Blindness, Meningioma, and HTN (per documentation) who was BIBA after sustaining an unwitnessed fall and found to have a left frontal SAH, SDH and stable meningiomas. Patient also found to have an acute right pars interarticularis fracture at T1. Evaluated by neurosurgery/trauma surgery and recommended medicine admission.     #Traumatic Left Frontal SAH and Acute Subdural Hematoma.   #Traumatic T1 Fracture  - SAH stable on interval CT  -cardiac monitoring, fall precautions  -Cervical Collar with thoracic extension for T1 fracture    #Acute Respiratory failure with hypoxia  #Complex PNA suspect Gram Negative Bacteria secondary to aspiration   Patient with upper airway secretions suspect potential aspiration however no fever or leukocytosis. May have component of mucous plugging.   Cont NC and wean as tolerated   CXR with b/l hilar infiltrates  Zosyn    #NSVT   No Electrolyte abnormality  Suspect in setting of infection  C/w metoprolol     #JESSE on CKD3  -creatinine 1.88 on admission  -monitor I/Os  -avoid nephrotoxic agents and renally dose medications  Improved after hydration    #Dementia with behavioral disturbance  #Dysphagia   -impulsive behavior, episodes of agitation removing cervical collar  -avoid benzos given elderly age  -constant observation  -supportive care  SLP consult given suspicion for dysphagia and dysphonia.   Puree Diet, No liquids  Aspiration precautions    #HFpEF G1DD  -strict I/Os, daily weights  -Monitor volume status    #History of HTN  -BP stable    DVT ppx - SCDS  Dispo- Remains Acute. Dispo  Code Status: DNR/DNI - by two attending consent. Patient is high risk for mortality given his dementia with unpredictable behavior, spinal fracture and dysphagia with aspiration. Aggressive measures are more likely to cause more harm and suffering than benefit.   Mentation has improved to some degree with medical management. Will cont to treat for the above problems and observe course prior to determination of placement.   Patient is pending bed at [facility]

## 2021-12-28 NOTE — PROGRESS NOTE ADULT - ASSESSMENT
Patient is a 89 year old male with PMH of Dementia, BPH, GERD, CHF, Right Eye Blindness, Meningioma, and HTN (per documentation) who was BIBA after sustaining an unwitnessed fall and found to have a left frontal SAH, SDH and stable meningiomas. Patient also found to have an acute right pars interarticularis fracture at T1. Evaluated by neurosurgery/trauma surgery and recommended medicine admission.     #Traumatic Left Frontal SAH and Acute Subdural Hematoma.   #Traumatic T1 Fracture  - SAH stable on interval CT  -cardiac monitoring, fall precautions  -Cervical Collar with thoracic extension for T1 fracture    #Acute Respiratory failure with hypoxia  #Complex PNA suspect Gram Negative Bacteria secondary to aspiration   Patient with upper airway secretions suspect potential aspiration however no fever or leukocytosis. May have component of mucous plugging.   Cont NC and wean as tolerated   CXR with b/l hilar infiltrates  Zosyn    #NSVT   No Electrolyte abnormality  Suspect in setting of infection  C/w metoprolol     #JESSE on CKD3  -creatinine 1.88 on admission  -monitor I/Os  -avoid nephrotoxic agents and renally dose medications  Improved after hydration    #Dementia with behavioral disturbance  #Dysphagia   -impulsive behavior, episodes of agitation removing cervical collar  -avoid benzos given elderly age  -constant observation  -supportive care  SLP consult given suspicion for dysphagia and dysphonia.   Puree Diet, No liquids  Aspiration precautions    #HFpEF G1DD  -strict I/Os, daily weights  -Monitor volume status    #History of HTN  -BP stable    DVT ppx - SCDS  Dispo- Remains Acute. Dispo  Code Status: DNR/DNI - by two attending consent. Patient is high risk for mortality given his dementia with unpredictable behavior, spinal fracture and dysphagia with aspiration. Aggressive measures are more likely to cause more harm and suffering than benefit.   Mentation has improved to some degree with medical management. Will cont to treat for the above problems and observe course prior to determination of placement.   Patient is pending bed at [facility]

## 2021-12-28 NOTE — DIETITIAN INITIAL EVALUATION ADULT. - OTHER INFO
Pt with h/o dementia, BPH, GERD, CHF, Right Eye Blindness, Meningioma, and HTN (per documentation) who was BIBA after sustaining an unwitnessed fall and found to have a left frontal SAH, SDH and stable meningiomas. Patient also found to have an acute right pars interarticularis fracture at T1. Evaluated by neurosurgery/trauma surgery and recommended medicine admission.

## 2021-12-28 NOTE — PROGRESS NOTE ADULT - SUBJECTIVE AND OBJECTIVE BOX
CC:  follow up symptoms    OVERNIGHT EVENTS:  none reported  Present Symptoms:     Dyspnea: no  Nausea/Vomiting: No  Anxiety:  Yes  1:1 at bedside  Depression:  No  Fatigue: Yes   Loss of appetite: Yes   Constipation: not reported    Pain: slight grimacing            Character-            Duration-            Effect-            Factors-            Frequency-            Location-            Severity-    Pain AD Score:  http://geriatrictoolkit.[email or web address]/painad.pdf (press ctrl + left click to view)    Review of Systems:  Unable to obtain due to poor mentation       MEDICATIONS  (STANDING):  azithromycin  IVPB 500 milliGRAM(s) IV Intermittent every 24 hours  dextrose 40% Gel 15 Gram(s) Oral once  dextrose 5% + sodium chloride 0.45%. 1000 milliLiter(s) (75 mL/Hr) IV Continuous <Continuous>  dextrose 5% + sodium chloride 0.9%. 1000 milliLiter(s) (75 mL/Hr) IV Continuous <Continuous>  dextrose 5%. 1000 milliLiter(s) (50 mL/Hr) IV Continuous <Continuous>  dextrose 5%. 1000 milliLiter(s) (100 mL/Hr) IV Continuous <Continuous>  dextrose 50% Injectable 25 Gram(s) IV Push once  dextrose 50% Injectable 12.5 Gram(s) IV Push once  dextrose 50% Injectable 25 Gram(s) IV Push once  glucagon  Injectable 1 milliGRAM(s) IntraMuscular once  LORazepam   Injectable 0.5 milliGRAM(s) IV Push every 6 hours  metoprolol tartrate 12.5 milliGRAM(s) Oral daily  piperacillin/tazobactam IVPB.. 3.375 Gram(s) IV Intermittent every 8 hours    MEDICATIONS  (PRN):  hydrALAZINE Injectable 10 milliGRAM(s) IV Push every 8 hours PRN SBP > 160  LORazepam   Injectable 0.5 milliGRAM(s) IV Push every 4 hours PRN Agitation  morphine  - Injectable 2 milliGRAM(s) IV Push every 4 hours PRN dyspnea or pain      PHYSICAL EXAM:    Vital Signs Last 24 Hrs  T(C): 36.4 (28 Dec 2021 11:25), Max: 36.8 (27 Dec 2021 15:51)  T(F): 97.6 (28 Dec 2021 11:25), Max: 98.2 (27 Dec 2021 15:51)  HR: 79 (28 Dec 2021 11:25) (51 - 101)  BP: 123/86 (28 Dec 2021 11:25) (123/68 - 169/78)  BP(mean): 98 (28 Dec 2021 11:25) (7 - 98)  RR: 18 (28 Dec 2021 11:25) (18 - 19)  SpO2: 100% (28 Dec 2021 11:25) (95% - 100%)    General: alert  oriented x _1___ NAD  Karnofsky:  30%  HEENT: normal    Neck - neck collar in place  Lungs: comfortable  CV: normal   GI: normal  :  incontinent   MSK   weakness   Skin: normal      LABS:                          14.3   7.12  )-----------( 255      ( 28 Dec 2021 03:29 )             44.3     12-28    143  |  104  |  21.0<H>  ----------------------------<  126<H>  4.1   |  26.0  |  1.47<H>    Ca    9.1      28 Dec 2021 03:29  Phos  2.6     12-28  Mg     2.0     12-28      I&O's Summary    27 Dec 2021 07:01  -  28 Dec 2021 07:00  --------------------------------------------------------  IN: 675 mL / OUT: 200 mL / NET: 475 mL    28 Dec 2021 07:01  -  28 Dec 2021 15:50  --------------------------------------------------------  IN: 475 mL / OUT: 0 mL / NET: 475 mL        RADIOLOGY & ADDITIONAL STUDIES:  < from: CT Head No Cont (12.26.21 @ 09:57) >    There is prominence of the ventricles, sulci and cisterns of the brain   which may be age related.  There are scattered white matter hypodensities   which are nonspecific but most commonly represent chronic microvascular   ischemic changes.    There remains trace subarachnoid hemorrhage in the left frontal sulci   superiorly, series 2 image 47. There is no midline shift or herniation.   Again noted is the left parafalcine frontoparietal hyperdense mass with   punctate calcifications peripherally, essentially stable in size   measuring 2.8 x 2.3 x 2.3 cm in AP, TRV and craniocaudal dimensions,   consistent with parafalcine meningioma.    There is no skull fracture. The visualized globes are symmetric   bilaterally. There are retention cyst/polyps in the left maxillary sinus   and mild to moderate mucosal thickening in the right maxillary sinus.   There are tympanomastoid effusions bilaterally.      IMPRESSION:    Compared to the previous examination,    There remains subarachnoid hemorrhage in the left frontal sulci.    Stable left parafalcine frontoparietal meningioma as described.    --- End of Report ---    < end of copied text >      ADVANCE DIRECTIVES/TREATMENT PREFERENCES:  DNR /I

## 2021-12-28 NOTE — CHART NOTE - NSCHARTNOTEFT_GEN_A_CORE
Ethics continues to follow. Follow up from GUANACO James LMSW at [facility]: Patient not in their EMR, but will have Medical Records check archived hard charts and let me know.     Updated DIOMEDES Anders LCSW (Palliative Care SW).     Collette Garay MD  Ethics Attending  [phone]

## 2021-12-28 NOTE — HOSPICE CARE NOTE - CONVESATION DETAILS
Patient approved for in hospice, 2 PC consents done, no bed available at [facility] or [facility], transfer pending bed availability. Patient will need updated COVID 19 swab.    FLORECITA Syed RN

## 2021-12-28 NOTE — DIETITIAN INITIAL EVALUATION ADULT. - OBTAIN DAILY WEIGHT
I SPOKE TO ELVIE'S WIFE-SHE WILL TALK TO HER  AND GET BACK TO US OR TALK TO DR Renu Camargo AT HIS NEXT APPT
Negative/no bacteria  Interstitial Cystitis would be a consideration    Follow up with Uro?
PT CALLED FOR THE RESULTS OF THE URINE CULTURE-HE IS STILL HAVING THE BURNING  NO URGENCY  HE HAS FINISHED THE ANTIBIOTIC    THANKS
yes

## 2021-12-28 NOTE — DIETITIAN INITIAL EVALUATION ADULT. - ORAL INTAKE PTA/DIET HISTORY
Aware pt with confusion.  Pt started eating breakfast this morning with assist, but then stopped for PT.  Pt currently receiving puree diet; however recommendation was for PUREE/NO LIQUIDS per SLP (12/27).  Notified RN and nursing assistant.

## 2021-12-28 NOTE — PROGRESS NOTE ADULT - SUBJECTIVE AND OBJECTIVE BOX
[facility] Division of Hospital Medicine    Chief Complaint:    Fall    SUBJECTIVE / OVERNIGHT EVENTS:  No overnight events    Patient denies chest pain, SOB, abd pain, N/V, fever, chills, dysuria or any other complaints. All remainder ROS negative.     MEDICATIONS  (STANDING):  azithromycin  IVPB 500 milliGRAM(s) IV Intermittent every 24 hours  dextrose 40% Gel 15 Gram(s) Oral once  dextrose 5% + sodium chloride 0.45%. 1000 milliLiter(s) (75 mL/Hr) IV Continuous <Continuous>  dextrose 5% + sodium chloride 0.9%. 1000 milliLiter(s) (75 mL/Hr) IV Continuous <Continuous>  dextrose 5%. 1000 milliLiter(s) (50 mL/Hr) IV Continuous <Continuous>  dextrose 5%. 1000 milliLiter(s) (100 mL/Hr) IV Continuous <Continuous>  dextrose 50% Injectable 25 Gram(s) IV Push once  dextrose 50% Injectable 12.5 Gram(s) IV Push once  dextrose 50% Injectable 25 Gram(s) IV Push once  glucagon  Injectable 1 milliGRAM(s) IntraMuscular once  LORazepam   Injectable 0.5 milliGRAM(s) IV Push every 6 hours  metoprolol tartrate 12.5 milliGRAM(s) Oral daily  piperacillin/tazobactam IVPB.. 3.375 Gram(s) IV Intermittent every 8 hours    MEDICATIONS  (PRN):  hydrALAZINE Injectable 10 milliGRAM(s) IV Push every 8 hours PRN SBP > 160  LORazepam   Injectable 0.5 milliGRAM(s) IV Push every 4 hours PRN Agitation  morphine  - Injectable 2 milliGRAM(s) IV Push every 4 hours PRN dyspnea or pain        I&O's Summary    27 Dec 2021 07:01  -  28 Dec 2021 07:00  --------------------------------------------------------  IN: 675 mL / OUT: 200 mL / NET: 475 mL    28 Dec 2021 07:01  -  28 Dec 2021 16:27  --------------------------------------------------------  IN: 475 mL / OUT: 0 mL / NET: 475 mL        PHYSICAL EXAM:  Vital Signs Last 24 Hrs  T(C): 36.6 (28 Dec 2021 16:11), Max: 36.6 (27 Dec 2021 23:30)  T(F): 97.8 (28 Dec 2021 16:11), Max: 97.9 (27 Dec 2021 23:30)  HR: 80 (28 Dec 2021 16:11) (51 - 90)  BP: 117/75 (28 Dec 2021 16:11) (117/75 - 163/63)  BP(mean): 98 (28 Dec 2021 11:25) (7 - 98)  RR: 18 (28 Dec 2021 16:11) (18 - 19)  SpO2: 96% (28 Dec 2021 16:11) (95% - 100%)        CONSTITUTIONAL: NAD, chronically ill appearing with c collar in place  ENMT: Moist oral mucosa, no pharyngeal injection or exudates; normal dentition  RESPIRATORY: Normal respiratory effort; lungs are coarse to auscultation bilaterally  CARDIOVASCULAR: Regular rate and rhythm, normal S1 and S2, no murmur/rub/gallop; Peripheral pulses are 2+ bilaterally  ABDOMEN: Nontender to palpation, normoactive bowel sounds, no rebound/guarding;   MUSCLOSKELETAL:  No clubbing or cyanosis of digits; no joint swelling or tenderness to palpation  PSYCH: A+O to person, place, and time; affect appropriate  NEUROLOGY: CN 2-12 are intact and symmetric; no gross sensory deficits;   SKIN: No rashes; no palpable lesions    LABS:                        14.3   7.12  )-----------( 255      ( 28 Dec 2021 03:29 )             44.3     12-28    143  |  104  |  21.0<H>  ----------------------------<  126<H>  4.1   |  26.0  |  1.47<H>    Ca    9.1      28 Dec 2021 03:29  Phos  2.6     12-28  Mg     2.0     12-28                CAPILLARY BLOOD GLUCOSE            RADIOLOGY & ADDITIONAL TESTS:  Results Reviewed:   Imaging Personally Reviewed:  Electrocardiogram Personally Reviewed:

## 2021-12-28 NOTE — DIETITIAN INITIAL EVALUATION ADULT. - PERTINENT LABORATORY DATA
12-28 Na143 mmol/L Glu 126 mg/dL<H> K+ 4.1 mmol/L Cr  1.47 mg/dL<H> BUN 21.0 mg/dL<H> Phos 2.6 mg/dL Alb n/a   PAB n/a

## 2021-12-29 ENCOUNTER — TRANSCRIPTION ENCOUNTER (OUTPATIENT)
Age: 86
End: 2021-12-29

## 2021-12-29 VITALS
SYSTOLIC BLOOD PRESSURE: 130 MMHG | TEMPERATURE: 97 F | RESPIRATION RATE: 20 BRPM | DIASTOLIC BLOOD PRESSURE: 62 MMHG | HEART RATE: 70 BPM

## 2021-12-29 LAB
ANION GAP SERPL CALC-SCNC: 11 MMOL/L — SIGNIFICANT CHANGE UP (ref 5–17)
BASOPHILS # BLD AUTO: 0.03 K/UL — SIGNIFICANT CHANGE UP (ref 0–0.2)
BASOPHILS NFR BLD AUTO: 0.5 % — SIGNIFICANT CHANGE UP (ref 0–2)
BUN SERPL-MCNC: 16.9 MG/DL — SIGNIFICANT CHANGE UP (ref 8–20)
CALCIUM SERPL-MCNC: 8.9 MG/DL — SIGNIFICANT CHANGE UP (ref 8.6–10.2)
CHLORIDE SERPL-SCNC: 104 MMOL/L — SIGNIFICANT CHANGE UP (ref 98–107)
CO2 SERPL-SCNC: 25 MMOL/L — SIGNIFICANT CHANGE UP (ref 22–29)
CREAT SERPL-MCNC: 1.29 MG/DL — SIGNIFICANT CHANGE UP (ref 0.5–1.3)
EOSINOPHIL # BLD AUTO: 0.27 K/UL — SIGNIFICANT CHANGE UP (ref 0–0.5)
EOSINOPHIL NFR BLD AUTO: 4.9 % — SIGNIFICANT CHANGE UP (ref 0–6)
GLUCOSE SERPL-MCNC: 132 MG/DL — HIGH (ref 70–99)
HCT VFR BLD CALC: 39.5 % — SIGNIFICANT CHANGE UP (ref 39–50)
HGB BLD-MCNC: 12.9 G/DL — LOW (ref 13–17)
IMM GRANULOCYTES NFR BLD AUTO: 2 % — HIGH (ref 0–1.5)
LYMPHOCYTES # BLD AUTO: 0.93 K/UL — LOW (ref 1–3.3)
LYMPHOCYTES # BLD AUTO: 16.8 % — SIGNIFICANT CHANGE UP (ref 13–44)
MAGNESIUM SERPL-MCNC: 1.9 MG/DL — SIGNIFICANT CHANGE UP (ref 1.6–2.6)
MCHC RBC-ENTMCNC: 32.7 GM/DL — SIGNIFICANT CHANGE UP (ref 32–36)
MCHC RBC-ENTMCNC: 33.7 PG — SIGNIFICANT CHANGE UP (ref 27–34)
MCV RBC AUTO: 103.1 FL — HIGH (ref 80–100)
MONOCYTES # BLD AUTO: 0.63 K/UL — SIGNIFICANT CHANGE UP (ref 0–0.9)
MONOCYTES NFR BLD AUTO: 11.4 % — SIGNIFICANT CHANGE UP (ref 2–14)
NEUTROPHILS # BLD AUTO: 3.58 K/UL — SIGNIFICANT CHANGE UP (ref 1.8–7.4)
NEUTROPHILS NFR BLD AUTO: 64.4 % — SIGNIFICANT CHANGE UP (ref 43–77)
PHOSPHATE SERPL-MCNC: 2.3 MG/DL — LOW (ref 2.4–4.7)
PLATELET # BLD AUTO: 264 K/UL — SIGNIFICANT CHANGE UP (ref 150–400)
POTASSIUM SERPL-MCNC: 4 MMOL/L — SIGNIFICANT CHANGE UP (ref 3.5–5.3)
POTASSIUM SERPL-SCNC: 4 MMOL/L — SIGNIFICANT CHANGE UP (ref 3.5–5.3)
RBC # BLD: 3.83 M/UL — LOW (ref 4.2–5.8)
RBC # FLD: 11.6 % — SIGNIFICANT CHANGE UP (ref 10.3–14.5)
SARS-COV-2 RNA SPEC QL NAA+PROBE: SIGNIFICANT CHANGE UP
SODIUM SERPL-SCNC: 140 MMOL/L — SIGNIFICANT CHANGE UP (ref 135–145)
WBC # BLD: 5.55 K/UL — SIGNIFICANT CHANGE UP (ref 3.8–10.5)
WBC # FLD AUTO: 5.55 K/UL — SIGNIFICANT CHANGE UP (ref 3.8–10.5)

## 2021-12-29 PROCEDURE — 83735 ASSAY OF MAGNESIUM: CPT

## 2021-12-29 PROCEDURE — 71045 X-RAY EXAM CHEST 1 VIEW: CPT

## 2021-12-29 PROCEDURE — 70450 CT HEAD/BRAIN W/O DYE: CPT | Mod: MA

## 2021-12-29 PROCEDURE — 87449 NOS EACH ORGANISM AG IA: CPT

## 2021-12-29 PROCEDURE — 82570 ASSAY OF URINE CREATININE: CPT

## 2021-12-29 PROCEDURE — 80053 COMPREHEN METABOLIC PANEL: CPT

## 2021-12-29 PROCEDURE — 87040 BLOOD CULTURE FOR BACTERIA: CPT

## 2021-12-29 PROCEDURE — 87640 STAPH A DNA AMP PROBE: CPT

## 2021-12-29 PROCEDURE — 83880 ASSAY OF NATRIURETIC PEPTIDE: CPT

## 2021-12-29 PROCEDURE — 36415 COLL VENOUS BLD VENIPUNCTURE: CPT

## 2021-12-29 PROCEDURE — 85025 COMPLETE CBC W/AUTO DIFF WBC: CPT

## 2021-12-29 PROCEDURE — 97163 PT EVAL HIGH COMPLEX 45 MIN: CPT

## 2021-12-29 PROCEDURE — 84100 ASSAY OF PHOSPHORUS: CPT

## 2021-12-29 PROCEDURE — 92610 EVALUATE SWALLOWING FUNCTION: CPT

## 2021-12-29 PROCEDURE — 85610 PROTHROMBIN TIME: CPT

## 2021-12-29 PROCEDURE — 80048 BASIC METABOLIC PNL TOTAL CA: CPT

## 2021-12-29 PROCEDURE — 96374 THER/PROPH/DIAG INJ IV PUSH: CPT

## 2021-12-29 PROCEDURE — 93306 TTE W/DOPPLER COMPLETE: CPT

## 2021-12-29 PROCEDURE — U0005: CPT

## 2021-12-29 PROCEDURE — 0225U NFCT DS DNA&RNA 21 SARSCOV2: CPT

## 2021-12-29 PROCEDURE — 85730 THROMBOPLASTIN TIME PARTIAL: CPT

## 2021-12-29 PROCEDURE — 90471 IMMUNIZATION ADMIN: CPT

## 2021-12-29 PROCEDURE — 96376 TX/PRO/DX INJ SAME DRUG ADON: CPT

## 2021-12-29 PROCEDURE — 99239 HOSP IP/OBS DSCHRG MGMT >30: CPT

## 2021-12-29 PROCEDURE — 85027 COMPLETE CBC AUTOMATED: CPT

## 2021-12-29 PROCEDURE — 90715 TDAP VACCINE 7 YRS/> IM: CPT

## 2021-12-29 PROCEDURE — 93005 ELECTROCARDIOGRAM TRACING: CPT

## 2021-12-29 PROCEDURE — 82550 ASSAY OF CK (CPK): CPT

## 2021-12-29 PROCEDURE — 72125 CT NECK SPINE W/O DYE: CPT | Mod: MA

## 2021-12-29 PROCEDURE — 72170 X-RAY EXAM OF PELVIS: CPT

## 2021-12-29 PROCEDURE — 84484 ASSAY OF TROPONIN QUANT: CPT

## 2021-12-29 PROCEDURE — U0003: CPT

## 2021-12-29 PROCEDURE — 99233 SBSQ HOSP IP/OBS HIGH 50: CPT

## 2021-12-29 PROCEDURE — 87641 MR-STAPH DNA AMP PROBE: CPT

## 2021-12-29 PROCEDURE — 99285 EMERGENCY DEPT VISIT HI MDM: CPT

## 2021-12-29 PROCEDURE — 82962 GLUCOSE BLOOD TEST: CPT

## 2021-12-29 PROCEDURE — 82553 CREATINE MB FRACTION: CPT

## 2021-12-29 PROCEDURE — 72141 MRI NECK SPINE W/O DYE: CPT | Mod: MA

## 2021-12-29 PROCEDURE — 84300 ASSAY OF URINE SODIUM: CPT

## 2021-12-29 PROCEDURE — 83690 ASSAY OF LIPASE: CPT

## 2021-12-29 PROCEDURE — 84145 PROCALCITONIN (PCT): CPT

## 2021-12-29 PROCEDURE — 81001 URINALYSIS AUTO W/SCOPE: CPT

## 2021-12-29 PROCEDURE — 83605 ASSAY OF LACTIC ACID: CPT

## 2021-12-29 RX ORDER — DONEPEZIL HYDROCHLORIDE 10 MG/1
1 TABLET, FILM COATED ORAL
Qty: 0 | Refills: 0 | DISCHARGE

## 2021-12-29 RX ORDER — CYCLOSPORINE 0.5 MG/ML
1 EMULSION OPHTHALMIC
Qty: 0 | Refills: 0 | DISCHARGE

## 2021-12-29 RX ORDER — PIPERACILLIN AND TAZOBACTAM 4; .5 G/20ML; G/20ML
3.38 INJECTION, POWDER, LYOPHILIZED, FOR SOLUTION INTRAVENOUS
Qty: 0 | Refills: 0 | DISCHARGE
Start: 2021-12-29 | End: 2021-12-31

## 2021-12-29 RX ADMIN — Medication 0.5 MILLIGRAM(S): at 12:55

## 2021-12-29 RX ADMIN — PIPERACILLIN AND TAZOBACTAM 25 GRAM(S): 4; .5 INJECTION, POWDER, LYOPHILIZED, FOR SOLUTION INTRAVENOUS at 05:55

## 2021-12-29 RX ADMIN — Medication 0.5 MILLIGRAM(S): at 05:55

## 2021-12-29 RX ADMIN — Medication 12.5 MILLIGRAM(S): at 09:22

## 2021-12-29 NOTE — PROGRESS NOTE ADULT - PROVIDER SPECIALTY LIST ADULT
Hospitalist
Neurosurgery
Palliative Care
Hospitalist
Hospitalist
Neurosurgery
Palliative Care
Hospitalist
Hospitalist
Palliative Care
Neurosurgery

## 2021-12-29 NOTE — DISCHARGE NOTE NURSING/CASE MANAGEMENT/SOCIAL WORK - PATIENT PORTAL LINK FT
You can access the FollowMyHealth Patient Portal offered by [facility] by registering at the following website: [email or web address]. By joining [employer]’s FollowMyHealth portal, you will also be able to view your health information using other applications (apps) compatible with our system.

## 2021-12-29 NOTE — DISCHARGE NOTE PROVIDER - ATTENDING DISCHARGE PHYSICAL EXAMINATION:
VITALS:   T(C): 36.5 (12-29-21 @ 02:05), Max: 36.6 (12-28-21 @ 16:11)  HR: 72 (12-29-21 @ 02:05) (72 - 80)  BP: 127/64 (12-29-21 @ 02:05) (117/75 - 127/64)  RR: 14 (12-29-21 @ 02:05) (14 - 18)  SpO2: 99% (12-29-21 @ 02:05) (96% - 100%)    GENERAL:elderly chronically ill appearing  EYES: EOMI, PERRLA, conjunctiva and sclera clear  ENT: Moist mucous membranes  NECK: Supple, No JVD, Cervicl collar in place  CHEST/LUNG: coarse to auscultation bilaterally; No rales, rhonchi, wheezing, or rubs. Unlabored respirations  HEART: Regular rate and rhythm; No murmurs, rubs, or gallops  ABDOMEN: BSx4; Soft, nontender, nondistended  EXTREMITIES:  2+ Peripheral Pulses, brisk capillary refill. No clubbing, cyanosis, or edema  NERVOUS SYSTEM:  A&Ox1, no focal deficits   SKIN: No rashes or lesions  PSYCH: Normal affect, euthymic mood

## 2021-12-29 NOTE — PROGRESS NOTE ADULT - TIME BILLING
Review of chart, labs, imaging, patient assessment, care plan.  Discussion with Dr. Medel, Dr. Garay, Shelby Vieira RN ( hospice)  Review of hospice paperwork
Patient assessment, care plan. Review of chart, labs, imaging.   Discussion with  Interdisciplinary team Dr. Rondon, Dr Garay, , hospice RN
chart review, lab review, imaging review, notes review. Discussion with  and coordination of care with all relevant providers and consultants caring for patient. discussion with Dr. Medel, Dr. Garay, Kristan MAIN.

## 2021-12-29 NOTE — PROGRESS NOTE ADULT - ASSESSMENT
89yr man hx  dementia, DHF, prior femoral neck fracture s/p right hip hemiarthroplasty (10/2019) from [facility] here with fall found to have left SAH.     #1 Dementia - unclear baseline.  Currently AOx1,  without capacity to make decisions. Assist with care  #2 Left SAH - stable per repeat CT 12/26. Monitor for pain. Morphine 2mg IVP PRN  #3 Agitation - needs symptomatic management - Continue Ativan 0.5mg IVP q6 hr with PRN  #4 Dyspnea - related to aspiration pneumonia - on antibiotics  #5 Dysphagia - cleared for dysphagia puree diet, but no liquids as of now.  Consider comfort feeds  #6 Pneumonia- discussed with Ethics and Dr. Hicks - to continue IVabx til 12/30.  This will help with his comfort.   #6 Encounter for palliative care -  Patient without capacity to make medical decisions secondary to SAH and dementia.  No reported surrogates could be located.  Overall poor prognosis, given baseline comorbidities now with SAH.    Accepted to hospice inpatient  Requested nurse to medicate with Ativan 0.5mg IVP prior to discharge  [facility]

## 2021-12-29 NOTE — DISCHARGE NOTE PROVIDER - HOSPITAL COURSE
Patient is a 89 year old male with PMH of Dementia, BPH, GERD, CHF, Right Eye Blindness, Meningioma, and HTN (per documentation) who was BIBA after sustaining an unwitnessed fall and found to have a left frontal SAH, SDH and stable meningiomas. Patient also found to have an acute right pars interarticularis fracture at T1. Evaluated by neurosurgery/trauma surgery and recommended medicine admission. With traumatic Left Frontal SAH and Acute Subdural Hematoma as well as Traumatic T1 Fracture  SAH stable on interval CT, NSGY consulted, Cervical Collar with thoracic extension for T1 fracture  CCB Acute Respiratory failure with hypoxia secondary to Complex PNA suspect Gram Negative Bacteria secondary to aspiration(Patient with upper airway secretions also with Dysphagia suspect potential aspiration) SLP consulted recommended Purees with no liquids. Started on Az/Zosyn. S.p 3 days az. To cont Zosyn until 12/31  With episode of NSVT in s/o PNA -No Electrolyte abnormality. Started low dose metoprolol  Patient with ongoing dementia with behavioral disturbances. Ethics and Palliative care consulted. Patient is high risk for mortality given his dementia with unpredictable behavior, spinal fracture and dysphagia with aspiration. Aggressive measures are more likely to cause more harm and suffering than benefit.   Mentation has improved to some degree with medical management. Will cont to treat for the above problems and observe course prior to determination of placement.   Patient is pending bed at [facility]

## 2021-12-29 NOTE — DISCHARGE NOTE NURSING/CASE MANAGEMENT/SOCIAL WORK - NSDCVIVACCINE_GEN_ALL_CORE_FT
Tdap; 22-Dec-2021 20:09; Eric Perla (RN); [employer]; [id number] (Exp. Date: 09-Sep-2023); IntraMuscular; Deltoid Left.; 0.5 milliLiter(s); VIS (VIS Published: 09-May-2013, VIS Presented: 22-Dec-2021);

## 2021-12-29 NOTE — DISCHARGE NOTE PROVIDER - NSDCMRMEDTOKEN_GEN_ALL_CORE_FT
acetaminophen 325 mg oral tablet: 2 tab(s) orally every 6 hours  aluminum hydroxide-magnesium hydroxide 200 mg-200 mg/5 mL oral suspension: 30 milliliter(s) orally 4 times a day, As needed, Indigestion  Colace 100 mg oral capsule: 1 cap(s) orally once a day  finasteride 5 mg oral tablet: 1 tab(s) orally once a day  magnesium hydroxide 8% oral suspension: 30 milliliter(s) orally 2 times a day, As needed, Constipation  metoprolol succinate 25 mg oral tablet, extended release: 1 tab(s) orally once a day  ocular lubricant ophthalmic solution: 1 drop(s) to each affected eye 2 times a day  pantoprazole 40 mg oral delayed release tablet: 1 tab(s) orally once a day (before a meal)  piperacillin-tazobactam: 3.375 milligram(s) intravenous every 8 hours  potassium chloride 10 mEq oral tablet, extended release: 1 tab(s) orally every 48 hours  ramipril 5 mg oral capsule: 1 cap(s) orally once a day  tamsulosin 0.4 mg oral capsule: 1 cap(s) orally once a day (at bedtime)

## 2021-12-29 NOTE — DISCHARGE NOTE PROVIDER - NSDCCPCAREPLAN_GEN_ALL_CORE_FT
PRINCIPAL DISCHARGE DIAGNOSIS  Diagnosis: T1 vertebral fracture  Assessment and Plan of Treatment:       SECONDARY DISCHARGE DIAGNOSES  Diagnosis: T1 vertebral fracture  Assessment and Plan of Treatment: C-collar at all times. Thoracic extension when out of bed    Diagnosis: Skin abrasion  Assessment and Plan of Treatment:     Diagnosis: Fall down stairs  Assessment and Plan of Treatment:     Diagnosis: Thoracic vertebral fracture  Assessment and Plan of Treatment:     Diagnosis: SAH (subarachnoid hemorrhage)  Assessment and Plan of Treatment: stable on interval CT    Diagnosis: SDH (subdural hematoma)  Assessment and Plan of Treatment: stable on interval cth    Diagnosis: Pneumonia, aspiration  Assessment and Plan of Treatment: Zosyn until 12/31    Diagnosis: Advancing dementia  Assessment and Plan of Treatment:     Diagnosis: Dysphagia  Assessment and Plan of Treatment:

## 2021-12-29 NOTE — PROGRESS NOTE ADULT - SUBJECTIVE AND OBJECTIVE BOX
CC: follow up sympotms    OVERNIGHT EVENTS:  [facility]    Present Symptoms:   agitation  1:1 at bedside    Dyspnea: no  Nausea/Vomiting:  No  Anxiety:  Yes   Depression:  No  Fatigue: Yes   Loss of appetite: Yes   Constipation: not reported    Pain: possible -  patient agitated,  patient non verbal            Character-            Duration-            Effect-            Factors-            Frequency-            Location-            Severity-    Pain AD Score:  [email or web address] (press ctrl + left click to view)    Review of Systems: Unable to obtain due to poor mentation       MEDICATIONS  (STANDING):  dextrose 40% Gel 15 Gram(s) Oral once  dextrose 5% + sodium chloride 0.45%. 1000 milliLiter(s) (75 mL/Hr) IV Continuous <Continuous>  dextrose 5% + sodium chloride 0.9%. 1000 milliLiter(s) (75 mL/Hr) IV Continuous <Continuous>  dextrose 5%. 1000 milliLiter(s) (50 mL/Hr) IV Continuous <Continuous>  dextrose 5%. 1000 milliLiter(s) (100 mL/Hr) IV Continuous <Continuous>  dextrose 50% Injectable 25 Gram(s) IV Push once  dextrose 50% Injectable 12.5 Gram(s) IV Push once  dextrose 50% Injectable 25 Gram(s) IV Push once  glucagon  Injectable 1 milliGRAM(s) IntraMuscular once  LORazepam   Injectable 0.5 milliGRAM(s) IV Push every 6 hours  metoprolol tartrate 12.5 milliGRAM(s) Oral daily  piperacillin/tazobactam IVPB.. 3.375 Gram(s) IV Intermittent every 8 hours    MEDICATIONS  (PRN):  hydrALAZINE Injectable 10 milliGRAM(s) IV Push every 8 hours PRN SBP > 160  LORazepam   Injectable 0.5 milliGRAM(s) IV Push every 4 hours PRN Agitation  morphine  - Injectable 2 milliGRAM(s) IV Push every 4 hours PRN dyspnea or pain      PHYSICAL EXAM:    Vital Signs Last 24 Hrs  T(C): 36.3 (29 Dec 2021 12:49), Max: 36.6 (28 Dec 2021 16:11)  T(F): 97.4 (29 Dec 2021 12:49), Max: 97.8 (28 Dec 2021 16:11)  HR: 70 (29 Dec 2021 12:49) (70 - 83)  BP: 130/62 (29 Dec 2021 12:49) (117/75 - 130/62)  BP(mean): --  RR: 20 (29 Dec 2021 12:49) (14 - 20)  SpO2: 95% (29 Dec 2021 08:00) (95% - 99%)    General: M little agitated NAD    Karnofsky: 30 %    HEENT: normal     Neck - brace in place  Lungs: comfortable  CV: normal    GI: normal    : incontinent   MSK:   weakness  bedbound/wheelchair bound  Skin: normal     LABS:                          12.9   5.55  )-----------( 264      ( 29 Dec 2021 07:50 )             39.5     12-29    140  |  104  |  16.9  ----------------------------<  132<H>  4.0   |  25.0  |  1.29    Ca    8.9      29 Dec 2021 07:50  Phos  2.3     12-29  Mg     1.9     12-29          I&O's Summary    28 Dec 2021 07:01  -  29 Dec 2021 07:00  --------------------------------------------------------  IN: 2100 mL / OUT: 600 mL / NET: 1500 mL        RADIOLOGY & ADDITIONAL STUDIES:    ADVANCE DIRECTIVES/TREATMENT PREFERENCES:  DNR /I

## 2021-12-30 LAB — GLUCOSE BLDC GLUCOMTR-MCNC: 126 MG/DL — HIGH (ref 70–99)

## 2022-01-09 NOTE — PROGRESS NOTE ADULT - PROBLEM SELECTOR PLAN 2
- Pt pulled out IV, replace today  - pt had 2 lits of fluid in the ED   -encourage oral hydration and assistance as need  -will add ensure   -Cr and BUN trending down - Pt pulled out IV, replace today  - pt had 2 lits of fluid in the ED   -encourage oral hydration and assistance as need  -added ensure  -Cr and BUN trending down IV intact

## 2022-09-24 NOTE — DIETITIAN INITIAL EVALUATION ADULT. - NS AS NUTRI INTERV MEDICAL AND FOOD SUPPLEMENTS
Labs:  CBC Full  -  ( 23 Sep 2022 16:22 )  WBC Count : 6.29 K/uL  RBC Count : 5.90 M/uL  Hemoglobin : 13.9 g/dL  Hematocrit : 43.5 %  Platelet Count - Automated : 440 K/uL  Mean Cell Volume : 73.7 fL  Mean Cell Hemoglobin : 23.6 pg  Mean Cell Hemoglobin Concentration : 32.0 g/dL  Auto Neutrophil # : 1.43 K/uL  Auto Lymphocyte # : 4.23 K/uL  Auto Monocyte # : 0.13 K/uL  Auto Eosinophil # : 0.25 K/uL  Auto Basophil # : 0.06 K/uL  Auto Neutrophil % : 21.8 %  Auto Lymphocyte % : 67.3 %  Auto Monocyte % : 2.0 %  Auto Eosinophil % : 3.9 %  Auto Basophil % : 1.0 %        135  |  99  |  7   ----------------------------<  85  4.4   |  20  |  <0.5<L>    Ca    9.5      23 Sep 2022 16:22  Phos  2.1       Mg     2.3         TPro  7.5<H>  /  Alb  5.7<H>  /  TBili  0.3  /  DBili  <0.2  /  AST  59  /  ALT  17  /  AlkPhos  2187<H>      LIVER FUNCTIONS - ( 23 Sep 2022 16:22 )  Alb: 5.7 g/dL / Pro: 7.5 g/dL / ALK PHOS: 2187 U/L / ALT: 17 U/L / AST: 59 U/L / GGT: x           Urinalysis Basic - ( 23 Sep 2022 17:55 )    Color: Yellow / Appearance: Slightly Turbid / S.030 / pH: x  Gluc: x / Ketone: Large  / Bili: Negative / Urobili: <2 mg/dL   Blood: x / Protein: 100 mg/dL / Nitrite: Negative   Leuk Esterase: Negative / RBC: 0 /HPF / WBC 0 /HPF   Sq Epi: x / Non Sq Epi: x / Bacteria: Moderate    Radiology:  US ABDOMEN RT UPR QUADRANT                        PROCEDURE DATE:  2022    ******PRELIMINARY REPORT******  IMPRESSION:  Normal right upper quadrant abdominal ultrasound.
8oz Ensure Enlive TID po/Commercial beverage

## 2022-10-12 NOTE — DISCHARGE NOTE NURSING/CASE MANAGEMENT/SOCIAL WORK - NSDCPEFALRISK_GEN_ALL_CORE
Pt reports hitting left wrist on wall to prevent from falling. Denies falling or hitting head. Left wrist deformity and bruising present. Left hand CMS intact. Takes eliquis.  
For information on Fall & Injury Prevention, visit: [email or web address] OR  [email or web address] OR  https://www.cdc.gov/steadi/patient.html

## 2022-11-08 NOTE — PATIENT PROFILE ADULT - NSPROPOAPRESSUREINJURYCT_GEN_A_NUR
PAST SURGICAL HISTORY:  H/O aortic valve replacement 2012 at [facility]    History of cardiac radiofrequency ablation 2/2020 for afib/flutter    History of gastric surgery      1

## 2023-09-14 NOTE — ED ADULT TRIAGE NOTE - NS ED NURSE BANDS TYPE
Stable. Continue statin.   Lab Results   Component Value Date    HGBA1C 8.0 (A) 09/14/2023 Name band;

## 2023-11-21 NOTE — ED PROVIDER NOTE - CPE EDP MUSC NORM
Additional Notes: Encouraged scar massages - patient denies pain or drainage in the area. Detail Level: Simple Render Risk Assessment In Note?: no normal...

## 2024-02-06 NOTE — PATIENT PROFILE ADULT - FUNCTIONAL ASSESSMENT - DAILY ACTIVITY ASSESSMENT TYPE
----- Message from Andrey Pina sent at 2/6/2024 11:37 AM CST -----  Contact: self  Type: Needs Medical Advice  Who Called: Patient   Best Call Back Number: [phone]  Additional Information: Pt was told the HYDROcodone-acetaminophen (NORCO)  mg per tablet Is on back order. Pt is on his last poll today plz call to advise. Thanks       Admission

## 2024-06-16 NOTE — BRIEF OPERATIVE NOTE - ANTIBIOTIC PROTOCOL
[facility] Department of Orthopaedic Surgery   Surgical History & Physical <30 Days    Reason for Surgery: R radial head dislocation, R ulna plastic deformation   Planned Procedure: Open vs closed reduction of radial head, percutaneous pinning of ulna    History & Physical Reviewed:  I have reviewed the History and Physical within 30 days dated 6/15. Relevant findings and updates are noted below:  No significant changes.    Home medications were reviewed with significant updates noted below:  No significant changes.    ERAS patient?: No    COVID-19 Risk Consent:   Surgeon has reviewed the key risks related to anne COVID-19 and subsequent sequelae.     06/16/24 at 12:42 AM - Sobeida Reeves MD     ancef per SCIP/Followed protocol

## 2024-10-14 NOTE — PROGRESS NOTE ADULT - ASSESSMENT
89ym s/p fall  CT of the cervical spine- fracture of the T 1 vertebral body     Plan  -pt will need to maintain in collar with extension.   -swallow evaluated and ordered puree diet.   - pt will need to follow up with Dr Alejandro as an outpatient in 2 weeks.   -at this time the patient does not need any surgical intervention therefore, will follow as outpatient.   89ym s/p fall  CT of the cervical spine- fracture of the T 1 vertebral body     Plan  -pt will need to maintain in collar with extension.   -swallow evaluated and ordered puree diet.   - pt will need to follow up with Dr Oliveira as an outpatient in 2 weeks. and then in 12 weeks.  -at this time the patient does not need any surgical intervention therefore, will follow as outpatient.   Pt will negotiate 8 steps with supervision by discharge

## 2025-05-18 NOTE — H&P ADULT - PROBLEM/PLAN-2
Medical team contacted [employer] because the ambulette scheduled on the previous day never showed up (Jennifer Whitehead ([phone]). [employer] attempted to contact the company but no one answered. [employer] contacted [employer] and they said they have no availability with any ambulette company today. The trip was rescheduled for tomorrow morning at 8:30a with Jennifer Whitehead. Since they are their preferred provider, [facility] was unable to change the company for tomorrow morning. DISPLAY PLAN FREE TEXT